# Patient Record
Sex: MALE | Race: WHITE | NOT HISPANIC OR LATINO | Employment: OTHER | URBAN - METROPOLITAN AREA
[De-identification: names, ages, dates, MRNs, and addresses within clinical notes are randomized per-mention and may not be internally consistent; named-entity substitution may affect disease eponyms.]

---

## 2017-02-28 RX ORDER — LISINOPRIL AND HYDROCHLOROTHIAZIDE 12.5; 1 MG/1; MG/1
TABLET ORAL
Qty: 90 TABLET | Refills: 3 | Status: SHIPPED | OUTPATIENT
Start: 2017-02-28 | End: 2018-02-13 | Stop reason: SDUPTHER

## 2017-02-28 RX ORDER — ATORVASTATIN CALCIUM 40 MG/1
TABLET, FILM COATED ORAL
Qty: 90 TABLET | Refills: 3 | Status: SHIPPED | OUTPATIENT
Start: 2017-02-28 | End: 2018-03-26 | Stop reason: SDUPTHER

## 2017-03-10 RX ORDER — CLOPIDOGREL BISULFATE 75 MG/1
75 TABLET ORAL DAILY
Qty: 90 TABLET | Refills: 1 | Status: SHIPPED | OUTPATIENT
Start: 2017-03-10 | End: 2017-09-01 | Stop reason: SDUPTHER

## 2017-05-08 ENCOUNTER — CLINICAL SUPPORT NO REQUIREMENTS (OUTPATIENT)
Dept: CARDIOLOGY | Facility: CLINIC | Age: 72
End: 2017-05-08

## 2017-05-08 VITALS — SYSTOLIC BLOOD PRESSURE: 118 MMHG | DIASTOLIC BLOOD PRESSURE: 84 MMHG

## 2017-05-08 DIAGNOSIS — I49.01 VF (VENTRICULAR FIBRILLATION) (HCC): Primary | ICD-10-CM

## 2017-05-25 RX ORDER — LISINOPRIL 20 MG/1
TABLET ORAL
Qty: 90 TABLET | Refills: 0 | Status: SHIPPED | OUTPATIENT
Start: 2017-05-25 | End: 2017-09-08 | Stop reason: SDUPTHER

## 2017-09-01 RX ORDER — CLOPIDOGREL BISULFATE 75 MG/1
TABLET ORAL
Qty: 90 TABLET | Refills: 0 | Status: SHIPPED | OUTPATIENT
Start: 2017-09-01 | End: 2018-01-18 | Stop reason: SDUPTHER

## 2017-09-08 RX ORDER — LISINOPRIL 20 MG/1
TABLET ORAL
Qty: 90 TABLET | Refills: 0 | Status: SHIPPED | OUTPATIENT
Start: 2017-09-08 | End: 2017-12-11 | Stop reason: SDUPTHER

## 2017-10-13 ENCOUNTER — HOSPITAL ENCOUNTER (OUTPATIENT)
Dept: GENERAL RADIOLOGY | Facility: HOSPITAL | Age: 72
Discharge: HOME | End: 2017-10-13
Attending: INTERNAL MEDICINE | Admitting: INTERNAL MEDICINE

## 2017-10-13 ENCOUNTER — TRANSCRIBE ORDERS (OUTPATIENT)
Dept: ADMINISTRATIVE | Facility: HOSPITAL | Age: 72
End: 2017-10-13

## 2017-10-13 DIAGNOSIS — M25.552 LEFT HIP PAIN: Primary | ICD-10-CM

## 2017-10-13 PROCEDURE — 73502 X-RAY EXAM HIP UNI 2-3 VIEWS: CPT

## 2017-10-16 ENCOUNTER — OFFICE VISIT (OUTPATIENT)
Dept: CARDIOLOGY | Facility: CLINIC | Age: 72
End: 2017-10-16

## 2017-10-16 VITALS
WEIGHT: 274 LBS | BODY MASS INDEX: 31.7 KG/M2 | HEIGHT: 78 IN | DIASTOLIC BLOOD PRESSURE: 82 MMHG | SYSTOLIC BLOOD PRESSURE: 119 MMHG | HEART RATE: 84 BPM

## 2017-10-16 DIAGNOSIS — I49.01 VF (VENTRICULAR FIBRILLATION) (HCC): ICD-10-CM

## 2017-10-16 DIAGNOSIS — E78.2 MIXED HYPERLIPIDEMIA: ICD-10-CM

## 2017-10-16 DIAGNOSIS — I25.10 CORONARY ARTERY DISEASE INVOLVING NATIVE CORONARY ARTERY OF NATIVE HEART WITHOUT ANGINA PECTORIS: Primary | ICD-10-CM

## 2017-10-16 DIAGNOSIS — I10 BENIGN HYPERTENSION: ICD-10-CM

## 2017-10-16 PROCEDURE — 93289 INTERROG DEVICE EVAL HEART: CPT | Performed by: INTERNAL MEDICINE

## 2017-10-16 PROCEDURE — 99214 OFFICE O/P EST MOD 30 MIN: CPT | Performed by: INTERNAL MEDICINE

## 2017-10-16 NOTE — PROGRESS NOTES
[facility] at [facility]  Office visit  Abbe Palacios  1945  [phone]    VISIT DATE:  10/16/2017    PCP: Xavier Briones MD  [address] [place] [id number]    CC:  Chief Complaint   Patient presents with   • Coronary Artery Disease     follow up       PROBLEM LIST:  1.  Coronary artery disease:  a.  Status post stenting of right coronary artery, bare-metal stent for acute myocardial infarction, 11/07/2007.  b. Status post elective stenting of proximal left anterior descending coronary artery, 11/14/2007.  c. Elina-infarction VF and post-infarction  ventricular ectopy with positive EP study culminating in dual-chamber ICD implantation, November 2007.  d. Recurrent chest pain with non-STEMI, status post cardiac catheterization, 01/24/2013, Dr. Jarrett:  PTCA and stenting of the first diagonal using  a 2.5 x 24 mm Promus drug-eluting stent, PTCA and stenting of the ramus intermedius using a 2.25 x 20 mm Promus drug-eluting stent.  2. Benign hypertension.  3. Hyperlipidemia.  4. Peripheral neuropathy.  5. Status post ICD generator change,  January 2014.     ASSESSMENT:   Diagnosis Plan   1. Coronary artery disease involving native coronary artery of native heart without angina pectoris     2. Benign hypertension     3. Mixed hyperlipidemia     4. VF (ventricular fibrillation)       Device interrogation:  Medtronic dual-chamber ICD  97%) pacing, sensed P-wave 2.4 mV, capture threshold 0.625 V at 0.4 ms, impedance 513 ohms  0% RV pacing, sensed R-wave 6.1 mV, capture threshold 1.125 mV at 0.9 ms, impedance 399 ohms    PLAN:  Coronary artery disease: Stable and asymptomatic.  Discontinuing dual antiplatelet therapy.  Instructed to stop low-dose aspirin, continue Plavix.  Continue high intensity statin therapy and current afterload reduction    Elina-infarct V. fib status post ICD implantation: Normal device interrogation.    Hyperlipidemia: Well controlled: Continue current  "medical therapy    Hypertension: Goal less than 140/90, controlled.  Continue current medical therapy    History of old pontine stroke: Continue Plavix, statin and afterload reduction.      Subjective  Report stable functional capacity.  Denies chest pain, palpitations or dyspnea on exertion.  Blood pressures run less than 140/90 mmHg.  Does report prolonged bleeding if he cuts himself shaving.  Denies myalgias on statin therapy.  He is compliant with medical therapy.  Device interrogation was negative for arrhythmia.      PHYSICAL EXAMINATION:  Vitals:    10/16/17 1309   BP: 119/82   BP Location: Left arm   Patient Position: Sitting   Pulse: 84   Weight: 274 lb (124 kg)   Height: 78\" (198.1 cm)     General Appearance:    Alert, cooperative, no distress, appears stated age   Head:    Normocephalic, without obvious abnormality, atraumatic   Eyes:    conjunctiva/corneas clear   Nose:   Nares normal, septum midline, mucosa normal, no drainage   Throat:   Lips, teeth and gums normal   Neck:   Supple, symmetrical, trachea midline, no carotid    bruit or JVD   Lungs:     Clear to auscultation bilaterally, respirations unlabored   Chest Wall:    No tenderness or deformity    Heart:    Regular rate and rhythm, S1 and S2 normal, no murmur, rub   or gallop, normal carotid impulse bilaterally without bruit.   Abdomen:     Soft, non-tender   Extremities:   Extremities normal, atraumatic, no cyanosis or edema   Pulses:   2+ and symmetric all extremities   Skin:   Skin color, texture, turgor normal, no rashes or lesions       Diagnostic Data:  Procedures  No results found for: CHLPL, TRIG, HDL, LDLDIRECT  Lab Results   Component Value Date    GLUCOSE 98 01/06/2014    BUN 22 (H) 01/06/2014    CREATININE 1.2 01/06/2014     01/06/2014    K 4.3 01/06/2014     (H) 01/06/2014    CO2 27 01/06/2014     Lab Results   Component Value Date    HGBA1C 6.9 (H) 01/06/2014     Lab Results   Component Value Date    WBC 11.05 (H) " 01/06/2014    HGB 15.8 01/06/2014    HCT 45.1 01/06/2014     01/06/2014       Allergies  Allergies   Allergen Reactions   • Biaxin [Clarithromycin]    • Levaquin [Levofloxacin]    • Nitroglycerin    • Penicillins        Current Medications    Current Outpatient Prescriptions:   •  aspirin 81 MG EC tablet, Take 81 mg by mouth Daily., Disp: , Rfl:   •  atorvastatin (LIPITOR) 40 MG tablet, take 1 tablet by mouth once daily, Disp: 90 tablet, Rfl: 3  •  clopidogrel (PLAVIX) 75 MG tablet, take 1 tablet by mouth once daily, Disp: 90 tablet, Rfl: 0  •  Glucos-Chond-Sterol-Fish Oil (GLUCOSAMINE CHONDROITIN PLUS PO), Take  by mouth Daily., Disp: , Rfl:   •  lisinopril (PRINIVIL,ZESTRIL) 20 MG tablet, take 1 tablet by mouth once daily, Disp: 90 tablet, Rfl: 0  •  lisinopril-hydrochlorothiazide (PRINZIDE,ZESTORETIC) 10-12.5 MG per tablet, take 1 tablet by mouth once daily, Disp: 90 tablet, Rfl: 3  •  metoprolol tartrate (LOPRESSOR) 50 MG tablet, TAKE 1 TABLET EVERY 12 HOURS DAILY., Disp: 180 tablet, Rfl: 3  •  Multiple Vitamins-Minerals (MULTIVITAMIN ADULT PO), Take  by mouth Daily., Disp: , Rfl:   •  Omega 3 1000 MG capsule, Take  by mouth Daily., Disp: , Rfl:           ROS  Review of Systems   Cardiovascular: Negative for chest pain, dyspnea on exertion, irregular heartbeat and palpitations.   Respiratory: Negative for shortness of breath.          SOCIAL HX  Social History     Social History   • Marital status:      Spouse name: N/A   • Number of children: N/A   • Years of education: N/A     Occupational History   • Not on file.     Social History Main Topics   • Smoking status: Never Smoker   • Smokeless tobacco: Never Used   • Alcohol use Yes      Comment: occassionally    • Drug use: No   • Sexual activity: Defer     Other Topics Concern   • Not on file     Social History Narrative       FAMILY HX  History reviewed. No pertinent family history.          Abbe Mendieta III, MD, [facility]

## 2017-12-07 RX ORDER — METOPROLOL TARTRATE 50 MG/1
TABLET, FILM COATED ORAL
Qty: 180 TABLET | Refills: 1 | Status: SHIPPED | OUTPATIENT
Start: 2017-12-07 | End: 2018-06-01 | Stop reason: SDUPTHER

## 2017-12-12 RX ORDER — LISINOPRIL 20 MG/1
TABLET ORAL
Qty: 90 TABLET | Refills: 3 | Status: SHIPPED | OUTPATIENT
Start: 2017-12-12 | End: 2018-02-13 | Stop reason: SDUPTHER

## 2017-12-20 ENCOUNTER — TELEPHONE (OUTPATIENT)
Dept: CARDIOLOGY | Facility: CLINIC | Age: 72
End: 2017-12-20

## 2017-12-20 NOTE — TELEPHONE ENCOUNTER
Patient called and needs cardiac clearance for his hip replacement on Jan 16 th with . Takes Plavix every day. Please advise.

## 2017-12-20 NOTE — TELEPHONE ENCOUNTER
Called patient ans told him to Stop plavix 5 days prior to procedure. Perioperative ICD reprogramming not indicated. Patient verbalized understanding. Faxed clearance letter to . Fax # [phone].

## 2018-01-02 ENCOUNTER — HOSPITAL ENCOUNTER (OUTPATIENT)
Dept: GENERAL RADIOLOGY | Facility: HOSPITAL | Age: 73
Discharge: HOME | End: 2018-01-02
Attending: INTERNAL MEDICINE | Admitting: INTERNAL MEDICINE

## 2018-01-02 ENCOUNTER — TRANSCRIBE ORDERS (OUTPATIENT)
Dept: ADMINISTRATIVE | Facility: HOSPITAL | Age: 73
End: 2018-01-02

## 2018-01-02 DIAGNOSIS — I10 BENIGN ESSENTIAL HYPERTENSION: Primary | ICD-10-CM

## 2018-01-02 PROCEDURE — 71046 X-RAY EXAM CHEST 2 VIEWS: CPT

## 2018-01-18 RX ORDER — CLOPIDOGREL BISULFATE 75 MG/1
TABLET ORAL
Qty: 90 TABLET | Refills: 0 | Status: SHIPPED | OUTPATIENT
Start: 2018-01-18 | End: 2018-04-21 | Stop reason: SDUPTHER

## 2018-02-13 RX ORDER — LISINOPRIL AND HYDROCHLOROTHIAZIDE 12.5; 1 MG/1; MG/1
TABLET ORAL
Qty: 90 TABLET | Refills: 3 | Status: SHIPPED | OUTPATIENT
Start: 2018-02-13 | End: 2019-02-28 | Stop reason: SDUPTHER

## 2018-03-26 RX ORDER — ATORVASTATIN CALCIUM 40 MG/1
TABLET, FILM COATED ORAL
Qty: 90 TABLET | Refills: 3 | Status: SHIPPED | OUTPATIENT
Start: 2018-03-26 | End: 2019-06-06 | Stop reason: SDUPTHER

## 2018-04-18 ENCOUNTER — OFFICE VISIT (OUTPATIENT)
Dept: CARDIOLOGY | Facility: CLINIC | Age: 73
End: 2018-04-18

## 2018-04-18 VITALS
HEART RATE: 77 BPM | BODY MASS INDEX: 31.47 KG/M2 | SYSTOLIC BLOOD PRESSURE: 110 MMHG | DIASTOLIC BLOOD PRESSURE: 66 MMHG | HEIGHT: 78 IN | WEIGHT: 272 LBS

## 2018-04-18 DIAGNOSIS — E78.2 MIXED HYPERLIPIDEMIA: ICD-10-CM

## 2018-04-18 DIAGNOSIS — I25.10 CORONARY ARTERY DISEASE INVOLVING NATIVE CORONARY ARTERY OF NATIVE HEART WITHOUT ANGINA PECTORIS: Primary | ICD-10-CM

## 2018-04-18 DIAGNOSIS — I10 BENIGN HYPERTENSION: ICD-10-CM

## 2018-04-18 PROCEDURE — 99214 OFFICE O/P EST MOD 30 MIN: CPT | Performed by: INTERNAL MEDICINE

## 2018-04-18 PROCEDURE — 93282 PRGRMG EVAL IMPLANTABLE DFB: CPT | Performed by: INTERNAL MEDICINE

## 2018-04-18 RX ORDER — LISINOPRIL 20 MG/1
20 TABLET ORAL DAILY
Refills: 0 | COMMUNITY
Start: 2018-03-09 | End: 2018-12-03 | Stop reason: SDUPTHER

## 2018-04-18 RX ORDER — TAMSULOSIN HYDROCHLORIDE 0.4 MG/1
0.4 CAPSULE ORAL DAILY
Refills: 0 | COMMUNITY
Start: 2018-03-23 | End: 2019-03-31 | Stop reason: SDUPTHER

## 2018-04-18 NOTE — PROGRESS NOTES
[facility] at [facility]  Office visit  Abbe Palacios  1945  [phone]    VISIT DATE:  10/16/2017    PCP: Xavier Briones MD  [address] [address]    CC:  Chief Complaint   Patient presents with   • Coronary Artery Disease       PROBLEM LIST:  1.  Coronary artery disease:  a.  Status post stenting of right coronary artery, bare-metal stent for acute myocardial infarction, 11/07/2007.  b. Status post elective stenting of proximal left anterior descending coronary artery, 11/14/2007.  c. Elina-infarction VF and post-infarction  ventricular ectopy with positive EP study culminating in dual-chamber ICD implantation, November 2007.  d. Recurrent chest pain with non-STEMI, status post cardiac catheterization, 01/24/2013, Dr. Jarrett:  PTCA and stenting of the first diagonal using  a 2.5 x 24 mm Promus drug-eluting stent, PTCA and stenting of the ramus intermedius using a 2.25 x 20 mm Promus drug-eluting stent.  2. Benign hypertension.  3. Hyperlipidemia.  4. Peripheral neuropathy.  5. Status post ICD generator change,  January 2014.     ASSESSMENT:   Diagnosis Plan   1. Coronary artery disease involving native coronary artery of native heart without angina pectoris     2. Benign hypertension     3. Mixed hyperlipidemia       Device interrogation:  Medtronic dual-chamber ICD  93%Atrial pacing, sensed P-wave 1.6 mV, capture threshold 0.5 V at 0.4 ms, impedance 456 ohms  0% RV pacing, sensed R-wave 7.1 mV, capture threshold 1.125 mV at 0.4 ms, impedance 399 ohms  10 episodes of nonsustained VT, all less than 5 beats in duration  Estimated battery life 20-34 months    PLAN:  Coronary artery disease: Stable and asymptomatic. continue Plavix.  Continue high intensity statin therapy and current afterload reduction    Elina-infarct V. fib status post ICD implantation: Normal device interrogation.    Hyperlipidemia: Well controlled: Continue current medical therapy, Goal LDL  "S100, ideally less than 70.    Hypertension: Goal less than 130/80, controlled.  Continue current medical therapy    History of old pontine stroke: Continue Plavix, statin and afterload reduction.      Subjective  Report stable functional capacity.  Denies chest pain, palpitations or dyspnea on exertion.  Blood pressures run less than 140/90 mmHg.    Denies myalgias on statin therapy.  He is compliant with medical therapy.  Device interrogation was negative for arrhythmia.  Has recovered well from left hip surgery in January.  Had some mild blood pressure lability after that but is stabilized.  Compliant with medical therapy.  Still with mild intermittent orthostasis.    PHYSICAL EXAMINATION:  Vitals:    04/18/18 1311   BP: 110/66   BP Location: Left arm   Patient Position: Sitting   Pulse: 77   Weight: 123 kg (272 lb)   Height: 198.1 cm (78\")     General Appearance:    Alert, cooperative, no distress, appears stated age   Head:    Normocephalic, without obvious abnormality, atraumatic   Eyes:    conjunctiva/corneas clear   Nose:   Nares normal, septum midline, mucosa normal, no drainage   Throat:   Lips, teeth and gums normal   Neck:   Supple, symmetrical, trachea midline, no carotid    bruit or JVD   Lungs:     Clear to auscultation bilaterally, respirations unlabored   Chest Wall:    No tenderness or deformity    Heart:    Regular rate and rhythm, S1 and S2 normal, no murmur, rub   or gallop, normal carotid impulse bilaterally without bruit.   Abdomen:     Soft, non-tender   Extremities:   Extremities normal, atraumatic, no cyanosis or edema   Pulses:   2+ and symmetric all extremities   Skin:   Skin color, texture, turgor normal, no rashes or lesions       Diagnostic Data:  Procedures  No results found for: CHLPL, TRIG, HDL, LDLDIRECT  Lab Results   Component Value Date    GLUCOSE 98 01/06/2014    BUN 22 (H) 01/06/2014    CREATININE 1.2 01/06/2014     01/06/2014    K 4.3 01/06/2014     (H) 01/06/2014 "    CO2 27 01/06/2014     Lab Results   Component Value Date    HGBA1C 6.9 (H) 01/06/2014     Lab Results   Component Value Date    WBC 11.05 (H) 01/06/2014    HGB 15.8 01/06/2014    HCT 45.1 01/06/2014     01/06/2014       Allergies  Allergies   Allergen Reactions   • Biaxin [Clarithromycin]    • Levaquin [Levofloxacin]    • Nitroglycerin    • Penicillins        Current Medications    Current Outpatient Prescriptions:   •  atorvastatin (LIPITOR) 40 MG tablet, take 1 tablet by mouth once daily, Disp: 90 tablet, Rfl: 3  •  clopidogrel (PLAVIX) 75 MG tablet, take 1 tablet by mouth once daily, Disp: 90 tablet, Rfl: 0  •  Glucos-Chond-Sterol-Fish Oil (GLUCOSAMINE CHONDROITIN PLUS PO), Take  by mouth Daily., Disp: , Rfl:   •  lisinopril (PRINIVIL,ZESTRIL) 20 MG tablet, Take 20 mg by mouth Daily., Disp: , Rfl: 0  •  lisinopril-hydrochlorothiazide (PRINZIDE,ZESTORETIC) 10-12.5 MG per tablet, take 1 tablet by mouth once daily, Disp: 90 tablet, Rfl: 3  •  metoprolol tartrate (LOPRESSOR) 50 MG tablet, take 1 tablet every 12 hours, Disp: 180 tablet, Rfl: 1  •  Multiple Vitamins-Minerals (MULTIVITAMIN ADULT PO), Take  by mouth Daily., Disp: , Rfl:   •  Omega 3 1000 MG capsule, Take  by mouth Daily., Disp: , Rfl:   •  tamsulosin (FLOMAX) 0.4 MG capsule 24 hr capsule, Take 0.4 mg by mouth Daily., Disp: , Rfl: 0          ROS  Review of Systems   Cardiovascular: Negative for chest pain, dyspnea on exertion, irregular heartbeat and palpitations.   Respiratory: Positive for sputum production. Negative for shortness of breath.          SOCIAL HX  Social History     Social History   • Marital status:      Spouse name: N/A   • Number of children: N/A   • Years of education: N/A     Occupational History   • Not on file.     Social History Main Topics   • Smoking status: Never Smoker   • Smokeless tobacco: Never Used   • Alcohol use Yes      Comment: occassionally    • Drug use: No   • Sexual activity: Defer     Other Topics  Concern   • Not on file     Social History Narrative   • No narrative on file       FAMILY HX  History reviewed. No pertinent family history.          Abbe Mendieta III, MD, FACC

## 2018-04-23 RX ORDER — CLOPIDOGREL BISULFATE 75 MG/1
TABLET ORAL
Qty: 90 TABLET | Refills: 0 | Status: SHIPPED | OUTPATIENT
Start: 2018-04-23 | End: 2018-07-14 | Stop reason: SDUPTHER

## 2018-06-01 RX ORDER — METOPROLOL TARTRATE 50 MG/1
TABLET, FILM COATED ORAL
Qty: 180 TABLET | Refills: 3 | Status: SHIPPED | OUTPATIENT
Start: 2018-06-01 | End: 2019-06-28 | Stop reason: SDUPTHER

## 2018-07-16 RX ORDER — CLOPIDOGREL BISULFATE 75 MG/1
TABLET ORAL
Qty: 90 TABLET | Refills: 0 | Status: SHIPPED | OUTPATIENT
Start: 2018-07-16 | End: 2018-10-05 | Stop reason: SDUPTHER

## 2018-10-05 RX ORDER — CLOPIDOGREL BISULFATE 75 MG/1
TABLET ORAL
Qty: 90 TABLET | Refills: 0 | Status: SHIPPED | OUTPATIENT
Start: 2018-10-05 | End: 2019-01-03 | Stop reason: SDUPTHER

## 2018-10-22 ENCOUNTER — OFFICE VISIT (OUTPATIENT)
Dept: CARDIOLOGY | Facility: CLINIC | Age: 73
End: 2018-10-22

## 2018-10-22 VITALS
OXYGEN SATURATION: 94 % | SYSTOLIC BLOOD PRESSURE: 118 MMHG | BODY MASS INDEX: 32.05 KG/M2 | WEIGHT: 277 LBS | DIASTOLIC BLOOD PRESSURE: 80 MMHG | HEART RATE: 78 BPM | HEIGHT: 78 IN

## 2018-10-22 DIAGNOSIS — I10 BENIGN HYPERTENSION: ICD-10-CM

## 2018-10-22 DIAGNOSIS — I25.10 CORONARY ARTERY DISEASE INVOLVING NATIVE CORONARY ARTERY OF NATIVE HEART WITHOUT ANGINA PECTORIS: Primary | ICD-10-CM

## 2018-10-22 DIAGNOSIS — E78.2 MIXED HYPERLIPIDEMIA: ICD-10-CM

## 2018-10-22 PROCEDURE — 99214 OFFICE O/P EST MOD 30 MIN: CPT | Performed by: INTERNAL MEDICINE

## 2018-10-22 PROCEDURE — 93283 PRGRMG EVAL IMPLANTABLE DFB: CPT | Performed by: INTERNAL MEDICINE

## 2018-10-22 RX ORDER — CYANOCOBALAMIN (VITAMIN B-12) 5000 MCG
1 TABLET,DISINTEGRATING ORAL DAILY
COMMUNITY

## 2018-10-22 NOTE — PROGRESS NOTES
[facility] at [facility]  Office visit  Abbe Palacios  1945  [phone]    VISIT DATE:  10/16/2017    PCP: Xavier Briones MD  [address] [id number]    CC:  Chief Complaint   Patient presents with   • Coronary Artery Disease   • Dizziness       PROBLEM LIST:  1.  Coronary artery disease:  a.  Status post stenting of right coronary artery, bare-metal stent for acute myocardial infarction, 11/07/2007.  b. Status post elective stenting of proximal left anterior descending coronary artery, 11/14/2007.  c. Elina-infarction VF and post-infarction  ventricular ectopy with positive EP study culminating in dual-chamber ICD implantation, November 2007.  d. Recurrent chest pain with non-STEMI, status post cardiac catheterization, 01/24/2013, Dr. Jarrett:  PTCA and stenting of the first diagonal using  a 2.5 x 24 mm Promus drug-eluting stent, PTCA and stenting of the ramus intermedius using a 2.25 x 20 mm Promus drug-eluting stent.  2. Benign hypertension.  3. Hyperlipidemia.  4. Peripheral neuropathy.  5. Status post ICD generator change,  January 2014.     ASSESSMENT:   Diagnosis Plan   1. Coronary artery disease involving native coronary artery of native heart without angina pectoris     2. Benign hypertension     3. Mixed hyperlipidemia       Device interrogation:  Medtronic dual-chamber ICD  95 %Atrial pacing, sensed P-wave 3.3 mV, capture threshold 0.5 V at 0.4 ms, impedance 532 ohms  0% RV pacing, sensed R-wave 8.5 mV, capture threshold 0.75 mV at 0.4 ms, impedance 399 ohms  No arrhythmia      PLAN:  Coronary artery disease: Stable and asymptomatic. continue Plavix.  Continue high intensity statin therapy and current afterload reduction    Elina-infarct V. fib status post ICD implantation: Normal device interrogation.    Hyperlipidemia: Very mild hypertriglyceridemia, otherwise well controlled: Continue current medical therapy, Goal LDL <100, ideally less than  "70.    Hypertension: Goal less than 130/80, controlled.  Continue current medical therapy.    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      Subjective  Report stable functional capacity.  Denies chest pain, palpitations or dyspnea on exertion.  Blood pressures run less than 140/90 mmHg.    Denies myalgias on statin therapy.  He is compliant with medical therapy.  Device interrogation was negative for arrhythmia.  Has recovered well from left hip surgery in January.   Compliant with medical therapy.  Had one episode of transient lightheadedness while on the toilet since his last evaluation.  Otherwise no issues with orthostasis.  Reviewed most recent fasting lipid panel.    PHYSICAL EXAMINATION:  Vitals:    10/22/18 1052   BP: 118/80   BP Location: Left arm   Patient Position: Sitting   Pulse: 78   SpO2: 94%   Weight: 126 kg (277 lb)   Height: 198.1 cm (78\")     General Appearance:    Alert, cooperative, no distress, appears stated age   Head:    Normocephalic, without obvious abnormality, atraumatic   Eyes:    conjunctiva/corneas clear   Nose:   Nares normal, septum midline, mucosa normal, no drainage   Throat:   Lips, teeth and gums normal   Neck:   Supple, symmetrical, trachea midline, no carotid    bruit or JVD   Lungs:     Clear to auscultation bilaterally, respirations unlabored   Chest Wall:    No tenderness or deformity    Heart:    Regular rate and rhythm, S1 and S2 normal, no murmur, rub   or gallop, normal carotid impulse bilaterally without bruit.   Abdomen:     Soft, non-tender   Extremities:   Extremities normal, atraumatic, no cyanosis or edema   Pulses:   2+ and symmetric all extremities   Skin:   Skin color, texture, turgor normal, no rashes or lesions       Diagnostic Data:  Procedures  No results found for: CHLPL, TRIG, HDL, LDLDIRECT  Lab Results   Component Value Date    GLUCOSE 98 01/06/2014    BUN 22 (H) 01/06/2014    CREATININE 1.2 01/06/2014     " 01/06/2014    K 4.3 01/06/2014     (H) 01/06/2014    CO2 27 01/06/2014     Lab Results   Component Value Date    HGBA1C 6.9 (H) 01/06/2014     Lab Results   Component Value Date    WBC 11.05 (H) 01/06/2014    HGB 15.8 01/06/2014    HCT 45.1 01/06/2014     01/06/2014       Allergies  Allergies   Allergen Reactions   • Biaxin [Clarithromycin]    • Levaquin [Levofloxacin]    • Nitroglycerin    • Penicillins        Current Medications    Current Outpatient Prescriptions:   •  acetaminophen (TYLENOL) 325 MG tablet, Take 650 mg by mouth As Needed for Mild Pain ., Disp: , Rfl:   •  atorvastatin (LIPITOR) 40 MG tablet, take 1 tablet by mouth once daily, Disp: 90 tablet, Rfl: 3  •  clopidogrel (PLAVIX) 75 MG tablet, TAKE 1 TABLET BY MOUTH ONCE DAILY, Disp: 90 tablet, Rfl: 0  •  lisinopril (PRINIVIL,ZESTRIL) 20 MG tablet, Take 20 mg by mouth Daily., Disp: , Rfl: 0  •  lisinopril-hydrochlorothiazide (PRINZIDE,ZESTORETIC) 10-12.5 MG per tablet, take 1 tablet by mouth once daily, Disp: 90 tablet, Rfl: 3  •  metoprolol tartrate (LOPRESSOR) 50 MG tablet, take 1 tablet by mouth every 12 hours, Disp: 180 tablet, Rfl: 3  •  Multiple Vitamins-Minerals (MULTIVITAMIN ADULT PO), Take  by mouth Daily., Disp: , Rfl:   •  Omega 3 1000 MG capsule, Take  by mouth Daily., Disp: , Rfl:   •  tamsulosin (FLOMAX) 0.4 MG capsule 24 hr capsule, Take 0.4 mg by mouth Daily., Disp: , Rfl: 0  •  vitamin B-12 (CYANOCOBALAMIN) 100 MCG tablet, Take 50 mcg by mouth Daily., Disp: , Rfl:           ROS  Review of Systems   Cardiovascular: Negative for chest pain, dyspnea on exertion, irregular heartbeat and palpitations.   Respiratory: Positive for sputum production. Negative for shortness of breath.          SOCIAL HX  Social History     Social History   • Marital status:      Spouse name: N/A   • Number of children: N/A   • Years of education: N/A     Occupational History   • Not on file.     Social History Main Topics   • Smoking status:  Never Smoker   • Smokeless tobacco: Never Used   • Alcohol use Yes      Comment: occassionally    • Drug use: No   • Sexual activity: Defer     Other Topics Concern   • Not on file     Social History Narrative   • No narrative on file       FAMILY HX  Family History   Problem Relation Age of Onset   • Hepatitis Mother    • Dementia Father    • Stroke Sister              Abbe Mendieta III, MD, FACC

## 2018-12-04 RX ORDER — LISINOPRIL 20 MG/1
TABLET ORAL
Qty: 90 TABLET | Refills: 0 | Status: SHIPPED | OUTPATIENT
Start: 2018-12-04 | End: 2019-03-07 | Stop reason: SDUPTHER

## 2019-01-03 RX ORDER — CLOPIDOGREL BISULFATE 75 MG/1
TABLET ORAL
Qty: 90 TABLET | Refills: 0 | Status: SHIPPED | OUTPATIENT
Start: 2019-01-03 | End: 2019-04-02 | Stop reason: SDUPTHER

## 2019-02-28 RX ORDER — LISINOPRIL AND HYDROCHLOROTHIAZIDE 12.5; 1 MG/1; MG/1
TABLET ORAL
Qty: 90 TABLET | Refills: 0 | Status: SHIPPED | OUTPATIENT
Start: 2019-02-28 | End: 2019-04-29

## 2019-03-07 RX ORDER — LISINOPRIL 20 MG/1
TABLET ORAL
Qty: 90 TABLET | Refills: 1 | Status: SHIPPED | OUTPATIENT
Start: 2019-03-07 | End: 2019-09-09 | Stop reason: SDUPTHER

## 2019-03-22 PROBLEM — L91.8 INFLAMED SKIN TAG: Status: ACTIVE | Noted: 2019-03-22

## 2019-03-22 PROBLEM — M25.552 LEFT HIP PAIN: Status: ACTIVE | Noted: 2019-03-22

## 2019-03-22 PROBLEM — N40.0 HYPERTROPHY OF PROSTATE WITHOUT URINARY OBSTRUCTION: Status: ACTIVE | Noted: 2019-03-22

## 2019-03-22 PROBLEM — I20.9 ANGINA PECTORIS: Status: ACTIVE | Noted: 2019-03-22

## 2019-03-22 PROBLEM — E11.42 DIABETIC POLYNEUROPATHY ASSOCIATED WITH TYPE 2 DIABETES MELLITUS (HCC): Status: ACTIVE | Noted: 2019-03-22

## 2019-03-22 PROBLEM — I25.10 ASHD (ARTERIOSCLEROTIC HEART DISEASE): Status: ACTIVE | Noted: 2019-03-22

## 2019-03-22 PROBLEM — J30.2 SEASONAL ALLERGIC RHINITIS: Status: ACTIVE | Noted: 2019-03-22

## 2019-03-22 PROBLEM — I10 BENIGN ESSENTIAL HYPERTENSION: Status: ACTIVE | Noted: 2019-03-22

## 2019-03-22 PROBLEM — Z13.9 ENCOUNTER FOR SCREENING: Status: ACTIVE | Noted: 2019-03-22

## 2019-03-22 PROBLEM — Z13.89 ENCOUNTER FOR SCREENING FOR OTHER DISORDER: Status: ACTIVE | Noted: 2019-03-22

## 2019-03-22 PROBLEM — M25.569 PAIN, KNEE: Status: ACTIVE | Noted: 2019-03-22

## 2019-03-22 PROBLEM — I50.22 CHRONIC SYSTOLIC (CONGESTIVE) HEART FAILURE (HCC): Status: ACTIVE | Noted: 2019-03-22

## 2019-03-22 PROBLEM — E78.2 MIXED HYPERLIPIDEMIA: Status: ACTIVE | Noted: 2019-03-22

## 2019-03-22 PROBLEM — E11.51 DIABETIC PERIPHERAL ANGIOPATHY (HCC): Status: ACTIVE | Noted: 2019-03-22

## 2019-03-22 PROBLEM — E66.9 OBESITY: Status: ACTIVE | Noted: 2019-03-22

## 2019-04-01 RX ORDER — TAMSULOSIN HYDROCHLORIDE 0.4 MG/1
CAPSULE ORAL
Qty: 30 CAPSULE | Refills: 5 | Status: SHIPPED | OUTPATIENT
Start: 2019-04-01 | End: 2019-10-08

## 2019-04-02 RX ORDER — CLOPIDOGREL BISULFATE 75 MG/1
TABLET ORAL
Qty: 90 TABLET | Refills: 0 | Status: SHIPPED | OUTPATIENT
Start: 2019-04-02 | End: 2019-06-28 | Stop reason: SDUPTHER

## 2019-04-11 ENCOUNTER — LAB REQUISITION (OUTPATIENT)
Dept: LAB | Facility: HOSPITAL | Age: 74
End: 2019-04-11

## 2019-04-11 ENCOUNTER — OFFICE VISIT (OUTPATIENT)
Dept: INTERNAL MEDICINE | Facility: CLINIC | Age: 74
End: 2019-04-11

## 2019-04-11 VITALS
HEART RATE: 80 BPM | BODY MASS INDEX: 32.28 KG/M2 | HEIGHT: 78 IN | WEIGHT: 279 LBS | DIASTOLIC BLOOD PRESSURE: 88 MMHG | SYSTOLIC BLOOD PRESSURE: 128 MMHG

## 2019-04-11 DIAGNOSIS — I10 BENIGN ESSENTIAL HYPERTENSION: ICD-10-CM

## 2019-04-11 DIAGNOSIS — E11.51 DIABETIC PERIPHERAL ANGIOPATHY (HCC): Primary | ICD-10-CM

## 2019-04-11 DIAGNOSIS — Z00.00 ROUTINE GENERAL MEDICAL EXAMINATION AT A HEALTH CARE FACILITY: ICD-10-CM

## 2019-04-11 DIAGNOSIS — E78.2 MIXED HYPERLIPIDEMIA: ICD-10-CM

## 2019-04-11 DIAGNOSIS — B35.4 TINEA CORPORIS: ICD-10-CM

## 2019-04-11 DIAGNOSIS — I25.10 CORONARY ARTERY DISEASE INVOLVING NATIVE CORONARY ARTERY OF NATIVE HEART WITHOUT ANGINA PECTORIS: ICD-10-CM

## 2019-04-11 LAB
ALBUMIN SERPL-MCNC: 4.8 G/DL (ref 3.5–5.2)
ALBUMIN/GLOB SERPL: 1.8 G/DL
ALP SERPL-CCNC: 85 U/L (ref 39–117)
ALT SERPL-CCNC: 26 U/L (ref 1–41)
AST SERPL-CCNC: 27 U/L (ref 1–40)
BILIRUB SERPL-MCNC: 0.6 MG/DL (ref 0.2–1.2)
BUN SERPL-MCNC: 14 MG/DL (ref 8–23)
BUN/CREAT SERPL: 11.9 (ref 7–25)
CALCIUM SERPL-MCNC: 9.8 MG/DL (ref 8.6–10.5)
CHLORIDE SERPL-SCNC: 104 MMOL/L (ref 98–107)
CHOLEST SERPL-MCNC: 160 MG/DL (ref 0–200)
CO2 SERPL-SCNC: 28.1 MMOL/L (ref 22–29)
CREAT SERPL-MCNC: 1.18 MG/DL (ref 0.76–1.27)
GLOBULIN SER CALC-MCNC: 2.6 GM/DL
GLUCOSE SERPL-MCNC: 134 MG/DL (ref 65–99)
HBA1C MFR BLD: 6.42 % (ref 4.8–5.6)
HDLC SERPL-MCNC: 45 MG/DL (ref 40–60)
LDLC SERPL CALC-MCNC: 86 MG/DL (ref 0–100)
POTASSIUM SERPL-SCNC: 4.8 MMOL/L (ref 3.5–5.2)
PROT SERPL-MCNC: 7.4 G/DL (ref 6–8.5)
SODIUM SERPL-SCNC: 143 MMOL/L (ref 136–145)
TRIGL SERPL-MCNC: 146 MG/DL (ref 0–150)
VLDLC SERPL CALC-MCNC: 29.2 MG/DL

## 2019-04-11 PROCEDURE — 99214 OFFICE O/P EST MOD 30 MIN: CPT | Performed by: INTERNAL MEDICINE

## 2019-04-11 PROCEDURE — 36415 COLL VENOUS BLD VENIPUNCTURE: CPT | Performed by: INTERNAL MEDICINE

## 2019-04-11 RX ORDER — KETOCONAZOLE 20 MG/G
CREAM TOPICAL DAILY
Qty: 15 G | Refills: 1 | Status: SHIPPED | OUTPATIENT
Start: 2019-04-11 | End: 2019-11-04

## 2019-04-11 NOTE — PROGRESS NOTES
[facility]     Abbe Palacios  1945   [id number]      Patient Care Team:  Xavier Briones MD as PCP - General  Xavier Briones MD as PCP - Family Medicine    Chief Complaint::   Chief Complaint   Patient presents with   • Coronary Artery Disease   • Hyperlipidemia   • Hypertension   • Diabetes        HPI  Judge Palacios is now 73.  He comes in for follow-up of his diabetes, hypertension, hyperlipidemia, and coronary artery disease.  He feels well and has no complaints today.  He does have a skin lesion on the left lower leg that he would like me to look at.  He remains physically active when the weather permits.  When he was in [place] he played golf 3 days a week and walk to the other days.  Now that the weather is warmer here he is walking most days.  He tells me he is not taking his Flomax regularly because he has very little difficulty with urine flow.    Chronic Conditions:      Patient Active Problem List   Diagnosis   • Coronary artery disease   • Benign hypertension   • Hyperlipidemia   • Peripheral neuropathy   • VF (ventricular fibrillation) (CMS/HCC)   • Hypertrophy of prostate without urinary obstruction   • Diabetic polyneuropathy associated with type 2 diabetes mellitus (CMS/HCC)   • Angina pectoris (CMS/HCC)   • Obesity   • Chronic systolic (congestive) heart failure (CMS/HCC)   • Mixed hyperlipidemia   • Encounter for screening for other disorder   • Diabetic peripheral angiopathy (CMS/HCC)   • Benign essential hypertension   • ASHD (arteriosclerotic heart disease)   • Seasonal allergic rhinitis   • Inflamed skin tag   • Left hip pain   • Encounter for screening   • Pain, knee        Past Medical History:   Diagnosis Date   • Benign hypertension    • Coronary artery disease    • Hyperlipidemia    • Implantable cardioverter-defibrillator (ICD) generator end of life 01/01/2014    Status post ICD generator change   • Myocardial infarction (CMS/HCC) 2013    NSTEMI   •  Osteoarthritis of left hip    • Peripheral neuropathy    • Stroke (CMS/HCC)     PONTINE STROKE   • Ventricular tachycardia (CMS/HCC)        Past Surgical History:   Procedure Laterality Date   • APPENDECTOMY  1979   • CARDIAC PACEMAKER PLACEMENT  2007    dual chamber   • CORONARY STENT PLACEMENT  2007    elective stents to LAD, CIRC   • CORONARY STENT PLACEMENT  2007    emergent L heart cath with stents RCA    • CORONARY STENT PLACEMENT      2013?  stents to diagonal and ramus   • HIP SURGERY Left 01/16/2018    total left hip arthroplasty, anterior approach   • SQUAMOUS CELL CARCINOMA EXCISION         Family History   Problem Relation Age of Onset   • Hepatitis Mother    • Dementia Father    • Stroke Sister    • Hypertension Brother    • Heart attack Paternal Grandfather 70       Social History     Socioeconomic History   • Marital status:      Spouse name: Not on file   • Number of children: Not on file   • Years of education: Not on file   • Highest education level: Not on file   Tobacco Use   • Smoking status: Never Smoker   • Smokeless tobacco: Never Used   • Tobacco comment: no passive smoke exposure   Substance and Sexual Activity   • Alcohol use: Yes     Comment: occassionally    • Drug use: No   • Sexual activity: Defer       Allergies   Allergen Reactions   • Biaxin [Clarithromycin] Nausea Only   • Levaquin [Levofloxacin] Nausea Only   • Nitroglycerin Other (See Comments)     hypotension   • Penicillins Rash         Current Outpatient Medications:   •  acetaminophen (TYLENOL) 325 MG tablet, Take 650 mg by mouth As Needed for Mild Pain ., Disp: , Rfl:   •  atorvastatin (LIPITOR) 40 MG tablet, take 1 tablet by mouth once daily, Disp: 90 tablet, Rfl: 3  •  clopidogrel (PLAVIX) 75 MG tablet, TAKE 1 TABLET BY MOUTH ONCE DAILY, Disp: 90 tablet, Rfl: 0  •  lisinopril (PRINIVIL,ZESTRIL) 20 MG tablet, TAKE 1 TABLET BY MOUTH ONCE DAILY, Disp: 90 tablet, Rfl: 1  •  lisinopril-hydrochlorothiazide  "(PRINZIDE,ZESTORETIC) 10-12.5 MG per tablet, TAKE 1 TABLET BY MOUTH ONCE DAILY (Patient taking differently: 1 tab bid), Disp: 90 tablet, Rfl: 0  •  metoprolol tartrate (LOPRESSOR) 50 MG tablet, take 1 tablet by mouth every 12 hours, Disp: 180 tablet, Rfl: 3  •  Multiple Vitamins-Minerals (MULTIVITAMIN ADULT PO), Take  by mouth Daily., Disp: , Rfl:   •  Omega 3 1000 MG capsule, Take  by mouth Daily., Disp: , Rfl:   •  vitamin B-12 (CYANOCOBALAMIN) 100 MCG tablet, Take 50 mcg by mouth Daily., Disp: , Rfl:   •  tamsulosin (FLOMAX) 0.4 MG capsule 24 hr capsule, TAKE 1 CAPSULE BY MOUTH ONCE DAILY. TAKE HALF AN HOUR AFTER THE SAME MEAL EACH DAY, Disp: 30 capsule, Rfl: 5    Review of Systems   Constitutional: Negative for chills, fatigue and fever.   HENT: Negative for congestion, ear pain and sinus pressure.    Respiratory: Negative for cough, chest tightness, shortness of breath and wheezing.    Cardiovascular: Negative for chest pain and palpitations.   Gastrointestinal: Negative for abdominal pain, blood in stool and constipation.   Skin: Negative for color change.   Allergic/Immunologic: Negative for environmental allergies.   Neurological: Negative for dizziness, speech difficulty and headache.   Psychiatric/Behavioral: Negative for decreased concentration. The patient is not nervous/anxious.         Vital Signs  Vitals:    04/11/19 0853   BP: 128/88   BP Location: Right arm   Patient Position: Sitting   Cuff Size: Adult   Pulse: 80   Weight: 127 kg (279 lb)   Height: 198.1 cm (77.99\")       Physical Exam   Constitutional: He is oriented to person, place, and time. He appears well-developed and well-nourished.   HENT:   Head: Normocephalic and atraumatic.   Cardiovascular: Normal rate, regular rhythm and normal heart sounds.   No murmur heard.  Pulmonary/Chest: Effort normal and breath sounds normal.   Neurological: He is alert and oriented to person, place, and time.   Skin:   On the left lower extremity above the " left medial malleolus he has about a 3 cm well-circumscribed lesion with central clearing consistent with ringworm.   Psychiatric: He has a normal mood and affect.   Vitals reviewed.     Procedures    ACE III MINI             Assessment/Plan:    Diabetes, A1c pending.  Foot exam performed 10/9/18, eye exam done recently.  Currently diet controlled.    Hypertension controlled on lisinopril, plus lisinopril hydrochlorothiazide.    Coronary artery disease stable on clopidogrel and metformin.    Tinea corporis, ketoconazole cream twice daily for the next 2 weeks.  If not improved would try fluconazole, but at that point would refer to dermatology.      Plan of care reviewed with patient at the conclusion of today's visit. Education was provided regarding diagnosis, management, and any prescribed or recommended OTC medications.Patient verbalizes understanding of and agreement with management plan.         Xavier Briones MD

## 2019-04-29 ENCOUNTER — OFFICE VISIT (OUTPATIENT)
Dept: CARDIOLOGY | Facility: CLINIC | Age: 74
End: 2019-04-29

## 2019-04-29 VITALS
WEIGHT: 284 LBS | BODY MASS INDEX: 32.86 KG/M2 | HEART RATE: 77 BPM | SYSTOLIC BLOOD PRESSURE: 134 MMHG | DIASTOLIC BLOOD PRESSURE: 82 MMHG | HEIGHT: 78 IN

## 2019-04-29 DIAGNOSIS — I10 BENIGN ESSENTIAL HYPERTENSION: ICD-10-CM

## 2019-04-29 DIAGNOSIS — I25.10 CORONARY ARTERY DISEASE INVOLVING NATIVE CORONARY ARTERY OF NATIVE HEART WITHOUT ANGINA PECTORIS: Primary | ICD-10-CM

## 2019-04-29 DIAGNOSIS — E78.2 MIXED HYPERLIPIDEMIA: ICD-10-CM

## 2019-04-29 PROCEDURE — 93283 PRGRMG EVAL IMPLANTABLE DFB: CPT | Performed by: INTERNAL MEDICINE

## 2019-04-29 PROCEDURE — 99214 OFFICE O/P EST MOD 30 MIN: CPT | Performed by: INTERNAL MEDICINE

## 2019-04-29 NOTE — PROGRESS NOTES
[facility] at [facility]  Office visit  Abbe Palacios  1945  [phone]    VISIT DATE:  4/29/2019    PCP: Xavier Briones MD  [id number] [place] [id number]    CC:  Chief Complaint   Patient presents with   • Coronary Artery Disease       PROBLEM LIST:  1.  Coronary artery disease:  a.  Status post stenting of right coronary artery, bare-metal stent for acute myocardial infarction, 11/07/2007.  b. Status post elective stenting of proximal left anterior descending coronary artery, 11/14/2007.  c. Elina-infarction VF and post-infarction  ventricular ectopy with positive EP study culminating in dual-chamber ICD implantation, November 2007.  d. Recurrent chest pain with non-STEMI, status post cardiac catheterization, 01/24/2013, Dr. Jarrett:  PTCA and stenting of the first diagonal using  a 2.5 x 24 mm Promus drug-eluting stent, PTCA and stenting of the ramus intermedius using a 2.25 x 20 mm Promus drug-eluting stent.  2. Benign hypertension.  3. Hyperlipidemia.  4. Peripheral neuropathy.  5. Status post ICD generator change,  January 2014.     ASSESSMENT:   Diagnosis Plan   1. Coronary artery disease involving native coronary artery of native heart without angina pectoris     2. Mixed hyperlipidemia     3. Benign essential hypertension       Device interrogation:  Medtronic dual-chamber ICD  93 %Atrial pacing, sensed P-wave 2.8 mV, capture threshold 0.65 V at 0.4 ms, impedance 513 ohms  0% RV pacing, sensed R-wave 5.6 mV, capture threshold 1.375 mV at 0.4 ms, impedance 399 ohms  No arrhythmia      PLAN:  Coronary artery disease: Stable and asymptomatic. continue Plavix.  Continue high intensity statin therapy and current afterload reduction    Elina-infarct V. fib status post ICD implantation: Normal device interrogation.    Hyperlipidemia: Currently well controlled.  Continue current medical therapy, Goal LDL <100, ideally less than 70.    Hypertension: Goal less than 130/80,  "controlled.  Continue current medical therapy.    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      Subjective  Report stable functional capacity.  Denies chest pain, palpitations or dyspnea on exertion.  Blood pressures run less than 140/90 mmHg.    Denies myalgias on statin therapy.  He is compliant with medical therapy.  Device interrogation was negative for arrhythmia. Reviewed most recent fasting lipid panel.    PHYSICAL EXAMINATION:  Vitals:    04/29/19 1434   BP: 134/82   BP Location: Right arm   Patient Position: Sitting   Pulse: 77   Weight: 129 kg (284 lb)   Height: 198.1 cm (78\")     General Appearance:    Alert, cooperative, no distress, appears stated age   Head:    Normocephalic, without obvious abnormality, atraumatic   Eyes:    conjunctiva/corneas clear   Nose:   Nares normal, septum midline, mucosa normal, no drainage   Throat:   Lips, teeth and gums normal   Neck:   Supple, symmetrical, trachea midline, no carotid    bruit or JVD   Lungs:     Clear to auscultation bilaterally, respirations unlabored   Chest Wall:    No tenderness or deformity    Heart:    Regular rate and rhythm, S1 and S2 normal, no murmur, rub   or gallop, normal carotid impulse bilaterally without bruit.   Abdomen:     Soft, non-tender   Extremities:   Extremities normal, atraumatic, no cyanosis or edema   Pulses:   2+ and symmetric all extremities   Skin:   Skin color, texture, turgor normal, no rashes or lesions       Diagnostic Data:  Procedures  Lab Results   Component Value Date    CHLPL 160 04/11/2019    TRIG 146 04/11/2019    HDL 45 04/11/2019     Lab Results   Component Value Date    GLUCOSE 98 01/06/2014    BUN 14 04/11/2019    CREATININE 1.18 04/11/2019     04/11/2019    K 4.8 04/11/2019     04/11/2019    CO2 28.1 04/11/2019     Lab Results   Component Value Date    HGBA1C 6.42 (H) 04/11/2019     Lab Results   Component Value Date    WBC 11.05 (H) 01/06/2014    HGB 15.8 " 01/06/2014    HCT 45.1 01/06/2014     01/06/2014       Allergies  Allergies   Allergen Reactions   • Biaxin [Clarithromycin] Nausea Only   • Levaquin [Levofloxacin] Nausea Only   • Nitroglycerin Other (See Comments)     hypotension   • Penicillins Rash       Current Medications    Current Outpatient Medications:   •  acetaminophen (TYLENOL) 325 MG tablet, Take 650 mg by mouth As Needed for Mild Pain ., Disp: , Rfl:   •  atorvastatin (LIPITOR) 40 MG tablet, take 1 tablet by mouth once daily, Disp: 90 tablet, Rfl: 3  •  clopidogrel (PLAVIX) 75 MG tablet, TAKE 1 TABLET BY MOUTH ONCE DAILY, Disp: 90 tablet, Rfl: 0  •  ketoconazole (NIZORAL) 2 % cream, Apply  topically to the appropriate area as directed Daily., Disp: 15 g, Rfl: 1  •  lisinopril (PRINIVIL,ZESTRIL) 20 MG tablet, TAKE 1 TABLET BY MOUTH ONCE DAILY, Disp: 90 tablet, Rfl: 1  •  metoprolol tartrate (LOPRESSOR) 50 MG tablet, take 1 tablet by mouth every 12 hours, Disp: 180 tablet, Rfl: 3  •  Multiple Vitamins-Minerals (MULTIVITAMIN ADULT PO), Take  by mouth Daily., Disp: , Rfl:   •  Omega 3 1000 MG capsule, Take  by mouth Daily., Disp: , Rfl:   •  tamsulosin (FLOMAX) 0.4 MG capsule 24 hr capsule, TAKE 1 CAPSULE BY MOUTH ONCE DAILY. TAKE HALF AN HOUR AFTER THE SAME MEAL EACH DAY, Disp: 30 capsule, Rfl: 5  •  vitamin B-12 (CYANOCOBALAMIN) 100 MCG tablet, Take 50 mcg by mouth Daily., Disp: , Rfl:           ROS  Review of Systems   Cardiovascular: Negative for chest pain, dyspnea on exertion, irregular heartbeat and palpitations.   Respiratory: Negative for shortness of breath and sputum production.          SOCIAL HX  Social History     Socioeconomic History   • Marital status:      Spouse name: Not on file   • Number of children: Not on file   • Years of education: Not on file   • Highest education level: Not on file   Tobacco Use   • Smoking status: Never Smoker   • Smokeless tobacco: Never Used   • Tobacco comment: no passive smoke exposure    Substance and Sexual Activity   • Alcohol use: Yes     Types: 1 Glasses of wine, 1 Cans of beer per week     Comment: occassionally    • Drug use: No   • Sexual activity: Not Currently     Partners: Female       FAMILY HX  Family History   Problem Relation Age of Onset   • Hepatitis Mother    • Dementia Father    • Stroke Sister    • Hypertension Brother    • Heart attack Paternal Grandfather 70             Abbe Mendieta III, MD, FACC

## 2019-06-06 RX ORDER — ATORVASTATIN CALCIUM 40 MG/1
TABLET, FILM COATED ORAL
Qty: 90 TABLET | Refills: 3 | Status: SHIPPED | OUTPATIENT
Start: 2019-06-06 | End: 2020-06-08

## 2019-06-06 RX ORDER — LISINOPRIL AND HYDROCHLOROTHIAZIDE 12.5; 1 MG/1; MG/1
TABLET ORAL
Qty: 90 TABLET | Refills: 3 | Status: SHIPPED | OUTPATIENT
Start: 2019-06-06 | End: 2020-06-08

## 2019-06-28 RX ORDER — CLOPIDOGREL BISULFATE 75 MG/1
TABLET ORAL
Qty: 90 TABLET | Refills: 3 | Status: SHIPPED | OUTPATIENT
Start: 2019-06-28 | End: 2020-06-25

## 2019-06-28 RX ORDER — METOPROLOL TARTRATE 50 MG/1
TABLET, FILM COATED ORAL
Qty: 180 TABLET | Refills: 3 | Status: SHIPPED | OUTPATIENT
Start: 2019-06-28 | End: 2020-06-25

## 2019-09-09 RX ORDER — LISINOPRIL 20 MG/1
TABLET ORAL
Qty: 90 TABLET | Refills: 3 | Status: SHIPPED | OUTPATIENT
Start: 2019-09-09 | End: 2020-09-08

## 2019-09-11 ENCOUNTER — TELEPHONE (OUTPATIENT)
Dept: CARDIOLOGY | Facility: CLINIC | Age: 74
End: 2019-09-11

## 2019-09-11 NOTE — TELEPHONE ENCOUNTER
Pt LVPHILIPPE and needs a letter stating he may avoid the metal detectors at the airport with his pacemaker for his trip to [place].     When returning his call no answer, LVM to return my phone call. Will await return call.

## 2019-10-08 ENCOUNTER — LAB REQUISITION (OUTPATIENT)
Dept: LAB | Facility: HOSPITAL | Age: 74
End: 2019-10-08

## 2019-10-08 ENCOUNTER — OFFICE VISIT (OUTPATIENT)
Dept: INTERNAL MEDICINE | Facility: CLINIC | Age: 74
End: 2019-10-08

## 2019-10-08 VITALS
WEIGHT: 273 LBS | BODY MASS INDEX: 31.59 KG/M2 | SYSTOLIC BLOOD PRESSURE: 136 MMHG | HEART RATE: 76 BPM | DIASTOLIC BLOOD PRESSURE: 82 MMHG | TEMPERATURE: 97.2 F | HEIGHT: 78 IN

## 2019-10-08 DIAGNOSIS — Z00.00 ROUTINE GENERAL MEDICAL EXAMINATION AT A HEALTH CARE FACILITY: ICD-10-CM

## 2019-10-08 DIAGNOSIS — I10 BENIGN ESSENTIAL HYPERTENSION: ICD-10-CM

## 2019-10-08 DIAGNOSIS — E11.42 DIABETIC POLYNEUROPATHY ASSOCIATED WITH TYPE 2 DIABETES MELLITUS (HCC): ICD-10-CM

## 2019-10-08 DIAGNOSIS — Z00.00 MEDICARE ANNUAL WELLNESS VISIT, SUBSEQUENT: Primary | ICD-10-CM

## 2019-10-08 DIAGNOSIS — E78.2 MIXED HYPERLIPIDEMIA: ICD-10-CM

## 2019-10-08 PROCEDURE — G0439 PPPS, SUBSEQ VISIT: HCPCS | Performed by: NURSE PRACTITIONER

## 2019-10-08 PROCEDURE — 36415 COLL VENOUS BLD VENIPUNCTURE: CPT | Performed by: NURSE PRACTITIONER

## 2019-10-08 PROCEDURE — 96160 PT-FOCUSED HLTH RISK ASSMT: CPT | Performed by: NURSE PRACTITIONER

## 2019-10-08 NOTE — PROGRESS NOTES
The ABCs of the Annual Wellness Visit  Subsequent Medicare Wellness Visit    Chief Complaint   Patient presents with   • Annual Exam     Patient is fasting       Subjective   History of Present Illness:  Abbe Palacios is a 74 y.o. male who presents for a Subsequent Medicare Wellness Visit.    HEALTH RISK ASSESSMENT    Recent Hospitalizations:  No hospitalization(s) within the last year.    Current Medical Providers:  Patient Care Team:  Xavier Briones MD as PCP - General  Xavier Briones MD as PCP - Family Medicine    Smoking Status:  Social History     Tobacco Use   Smoking Status Never Smoker   Smokeless Tobacco Never Used   Tobacco Comment    no passive smoke exposure       Alcohol Consumption:  Social History     Substance and Sexual Activity   Alcohol Use Yes   • Alcohol/week: 0.0 - 1.2 oz    Comment: occassionally        Depression Screen:   PHQ-2/PHQ-9 Depression Screening 10/8/2019   Little interest or pleasure in doing things 0   Feeling down, depressed, or hopeless 0   Total Score 0       Fall Risk Screen:  MIK Fall Risk Assessment was completed, and patient is at MODERATE risk for falls. Assessment completed on:10/8/2019    Health Habits and Functional and Cognitive Screening:  Functional & Cognitive Status 10/8/2019   Do you have difficulty preparing food and eating? No   Do you have difficulty bathing yourself, getting dressed or grooming yourself? No   Do you have difficulty using the toilet? No   Do you have difficulty moving around from place to place? No   Do you have trouble with steps or getting out of a bed or a chair? No   Current Diet Well Balanced Diet   Dental Exam Up to date   Eye Exam Up to date   Exercise (times per week) 4 times per week   Current Exercise Activities Include Walking   Do you need help using the phone?  No   Are you deaf or do you have serious difficulty hearing?  No   Do you need help with transportation? No   Do you need help shopping? No   Do you need  help preparing meals?  No   Do you need help with housework?  No   Do you need help with laundry? No   Do you need help taking your medications? No   Do you need help managing money? No   Do you ever drive or ride in a car without wearing a seat belt? No   Have you felt unusual stress, anger or loneliness in the last month? No   Who do you live with? Spouse   If you need help, do you have trouble finding someone available to you? No   Do you have difficulty concentrating, remembering or making decisions? No         Does the patient have evidence of cognitive impairment? No    Asprin use counseling:Does not need ASA (and currently is not on it)    Age-appropriate Screening Schedule:  Refer to the list below for future screening recommendations based on patient's age, sex and/or medical conditions. Orders for these recommended tests are listed in the plan section. The patient has been provided with a written plan.    Health Maintenance   Topic Date Due   • URINE MICROALBUMIN  1945   • ZOSTER VACCINE (1 of 2) 06/02/2010   • PNEUMOCOCCAL VACCINES (65+ LOW/MEDIUM RISK) (2 of 2 - PPSV23) 09/23/2016   • INFLUENZA VACCINE  08/01/2019   • DIABETIC EYE EXAM  10/03/2019   • HEMOGLOBIN A1C  10/11/2019   • LIPID PANEL  04/11/2020   • COLONOSCOPY  12/02/2021   • TDAP/TD VACCINES (2 - Td) 09/27/2022          The following portions of the patient's history were reviewed and updated as appropriate: allergies, current medications, past family history, past medical history, past social history, past surgical history and problem list.    Outpatient Medications Prior to Visit   Medication Sig Dispense Refill   • Acetaminophen 500 MG coapsule Take 1,000 mg by mouth As Needed for Mild Pain .     • atorvastatin (LIPITOR) 40 MG tablet TAKE 1 TABLET BY MOUTH ONCE DAILY 90 tablet 3   • clopidogrel (PLAVIX) 75 MG tablet TAKE 1 TABLET BY MOUTH ONCE DAILY 90 tablet 3   • Cyanocobalamin (VITAMIN B-12 PO) Take 5,000 mcg by mouth Daily.     •  ketoconazole (NIZORAL) 2 % cream Apply  topically to the appropriate area as directed Daily. 15 g 1   • lisinopril (PRINIVIL,ZESTRIL) 20 MG tablet TAKE 1 TABLET BY MOUTH ONCE DAILY 90 tablet 3   • lisinopril-hydrochlorothiazide (PRINZIDE,ZESTORETIC) 10-12.5 MG per tablet TAKE 1 TABLET BY MOUTH ONCE DAILY 90 tablet 3   • metoprolol tartrate (LOPRESSOR) 50 MG tablet TAKE 1 TABLET BY MOUTH EVERY 12 HOURS 180 tablet 3   • Multiple Vitamins-Minerals (MULTIVITAMIN ADULT PO) Take 1 tablet by mouth Daily.     • Omega 3 1000 MG capsule Take 1 capsule by mouth Daily.     • tamsulosin (FLOMAX) 0.4 MG capsule 24 hr capsule TAKE 1 CAPSULE BY MOUTH ONCE DAILY. TAKE HALF AN HOUR AFTER THE SAME MEAL EACH DAY 30 capsule 5     No facility-administered medications prior to visit.        Patient Active Problem List   Diagnosis   • Coronary artery disease   • Peripheral neuropathy   • VF (ventricular fibrillation) (CMS/HCC)   • Hypertrophy of prostate without urinary obstruction   • Diabetic polyneuropathy associated with type 2 diabetes mellitus (CMS/HCC)   • Angina pectoris (CMS/HCC)   • Obesity   • Chronic systolic (congestive) heart failure (CMS/HCC)   • Mixed hyperlipidemia   • Encounter for screening for other disorder   • Diabetic peripheral angiopathy (CMS/HCC)   • Benign essential hypertension   • ASHD (arteriosclerotic heart disease)   • Seasonal allergic rhinitis   • Inflamed skin tag   • Left hip pain   • Encounter for screening   • Pain, knee       Advanced Care Planning:  Patient has an advance directive - a copy has not been provided. Have asked the patient to send this to us to add to record    Review of Systems   Constitutional: Negative for chills, fatigue and fever.   HENT: Negative for congestion, ear pain and sinus pressure.    Respiratory: Negative for cough, chest tightness, shortness of breath and wheezing.    Cardiovascular: Negative for chest pain and palpitations.   Gastrointestinal: Negative for abdominal  "pain, blood in stool and constipation.   Skin: Negative for color change.   Allergic/Immunologic: Positive for environmental allergies.   Neurological: Negative for dizziness, speech difficulty and headaches.   Psychiatric/Behavioral: Negative for confusion. The patient is not nervous/anxious.        Compared to one year ago, the patient feels his physical health is better.  Compared to one year ago, the patient feels his mental health is the same.    Reviewed chart for potential of high risk medication in the elderly: yes  Reviewed chart for potential of harmful drug interactions in the elderly:yes    Objective         Vitals:    10/08/19 0913   BP: 136/82   BP Location: Left arm   Patient Position: Sitting   Cuff Size: Large Adult   Pulse: 76   Temp: 97.2 °F (36.2 °C)   TempSrc: Temporal   Weight: 124 kg (273 lb)   Height: 198 cm (77.95\")   PainSc: 0-No pain       Body mass index is 31.59 kg/m².  Discussed the patient's BMI with him. The BMI is above average; BMI management plan is completed.    Physical Exam          Assessment/Plan   Medicare Risks and Personalized Health Plan  CMS Preventative Services Quick Reference  Immunizations Discussed/Encouraged (specific immunizations; Influenza and Shingrix )    The above risks/problems have been discussed with the patient.  Pertinent information has been shared with the patient in the After Visit Summary.  Follow up plans and orders are seen below in the Assessment/Plan Section.    Diagnoses and all orders for this visit:    1. Medicare annual wellness visit, subsequent (Primary)    2. Mixed hyperlipidemia  -     Lipid Panel; Future  -     TSH Rfx On Abnormal To Free T4; Future    3. Benign essential hypertension  -     CBC & Differential; Future  -     Comprehensive Metabolic Panel; Future  -     Microalbumin / Creatinine Urine Ratio - Urine, Clean Catch; Future    4. Diabetic polyneuropathy associated with type 2 diabetes mellitus (CMS/[facility])  -     Hemoglobin A1c; " Future      Follow Up:  No Follow-up on file.     An After Visit Summary and PPPS were given to the patient.

## 2019-10-08 NOTE — PATIENT INSTRUCTIONS
Medicare Wellness  Personal Prevention Plan of Service     Date of Office Visit:  10/08/2019  Encounter Provider:  KIRSTEN Chacko  Place of Service:  [facility]  Patient Name: Abbe Palacios  :  1945    As part of the Medicare Wellness portion of your visit today, we are providing you with this personalized preventive plan of services (PPPS). This plan is based upon recommendations of the [place] Preventive Services Task Force (USPSTF) and the Advisory Committee on Immunization Practices (ACIP).    This lists the preventive care services that should be considered, and provides dates of when you are due. Items listed as completed are up-to-date and do not require any further intervention.    Health Maintenance   Topic Date Due   • URINE MICROALBUMIN  1945   • ZOSTER VACCINE (1 of 2) 2010   • PNEUMOCOCCAL VACCINES (65+ LOW/MEDIUM RISK) (2 of 2 - PPSV23) 2016   • MEDICARE ANNUAL WELLNESS  2017   • INFLUENZA VACCINE  2019   • DIABETIC EYE EXAM  10/03/2019   • HEMOGLOBIN A1C  10/11/2019   • LIPID PANEL  2020   • COLONOSCOPY  2021   • TDAP/TD VACCINES (2 - Td) 2022       Orders Placed This Encounter   Procedures   • Comprehensive Metabolic Panel     Standing Status:   Future     Standing Expiration Date:   10/8/2020   • Lipid Panel     Standing Status:   Future     Standing Expiration Date:   10/8/2020   • TSH Rfx On Abnormal To Free T4     Standing Status:   Future     Standing Expiration Date:   10/8/2020   • Microalbumin / Creatinine Urine Ratio - Urine, Clean Catch     Standing Status:   Future     Standing Expiration Date:   10/8/2020   • Hemoglobin A1c     Standing Status:   Future     Standing Expiration Date:   10/8/2020   • CBC & Differential     Standing Status:   Future     Standing Expiration Date:   10/8/2020     Order Specific Question:   Manual Differential     Answer:   No       Return for Annual  physical, Labs this visit, Next scheduled follow up.

## 2019-10-09 LAB
ALBUMIN SERPL-MCNC: 4.7 G/DL (ref 3.5–5.2)
ALBUMIN/CREAT UR: 7.9 MG/G CREAT (ref 0–30)
ALBUMIN/GLOB SERPL: 1.8 G/DL
ALP SERPL-CCNC: 83 U/L (ref 39–117)
ALT SERPL-CCNC: 23 U/L (ref 1–41)
AST SERPL-CCNC: 24 U/L (ref 1–40)
BASOPHILS # BLD AUTO: 0.05 10*3/MM3 (ref 0–0.2)
BASOPHILS NFR BLD AUTO: 0.6 % (ref 0–1.5)
BILIRUB SERPL-MCNC: 0.6 MG/DL (ref 0.2–1.2)
BUN SERPL-MCNC: 12 MG/DL (ref 8–23)
BUN/CREAT SERPL: 10.5 (ref 7–25)
CALCIUM SERPL-MCNC: 9.8 MG/DL (ref 8.6–10.5)
CHLORIDE SERPL-SCNC: 100 MMOL/L (ref 98–107)
CHOLEST SERPL-MCNC: 159 MG/DL (ref 0–200)
CO2 SERPL-SCNC: 29.2 MMOL/L (ref 22–29)
CREAT SERPL-MCNC: 1.14 MG/DL (ref 0.76–1.27)
CREAT UR-MCNC: 148.1 MG/DL
EOSINOPHIL # BLD AUTO: 0.37 10*3/MM3 (ref 0–0.4)
EOSINOPHIL NFR BLD AUTO: 4.1 % (ref 0.3–6.2)
ERYTHROCYTE [DISTWIDTH] IN BLOOD BY AUTOMATED COUNT: 12.8 % (ref 12.3–15.4)
GLOBULIN SER CALC-MCNC: 2.6 GM/DL
GLUCOSE SERPL-MCNC: 120 MG/DL (ref 65–99)
HBA1C MFR BLD: 6.3 % (ref 4.8–5.6)
HCT VFR BLD AUTO: 47.2 % (ref 37.5–51)
HDLC SERPL-MCNC: 46 MG/DL (ref 40–60)
HGB BLD-MCNC: 16.1 G/DL (ref 13–17.7)
IMM GRANULOCYTES # BLD AUTO: 0.04 10*3/MM3 (ref 0–0.05)
IMM GRANULOCYTES NFR BLD AUTO: 0.4 % (ref 0–0.5)
LDLC SERPL CALC-MCNC: 80 MG/DL (ref 0–100)
LYMPHOCYTES # BLD AUTO: 2.7 10*3/MM3 (ref 0.7–3.1)
LYMPHOCYTES NFR BLD AUTO: 30.1 % (ref 19.6–45.3)
MCH RBC QN AUTO: 33.1 PG (ref 26.6–33)
MCHC RBC AUTO-ENTMCNC: 34.1 G/DL (ref 31.5–35.7)
MCV RBC AUTO: 97.1 FL (ref 79–97)
MICROALBUMIN UR-MCNC: 11.7 UG/ML
MONOCYTES # BLD AUTO: 0.67 10*3/MM3 (ref 0.1–0.9)
MONOCYTES NFR BLD AUTO: 7.5 % (ref 5–12)
NEUTROPHILS # BLD AUTO: 5.13 10*3/MM3 (ref 1.7–7)
NEUTROPHILS NFR BLD AUTO: 57.3 % (ref 42.7–76)
NRBC BLD AUTO-RTO: 0 /100 WBC (ref 0–0.2)
PLATELET # BLD AUTO: 264 10*3/MM3 (ref 140–450)
POTASSIUM SERPL-SCNC: 4.7 MMOL/L (ref 3.5–5.2)
PROT SERPL-MCNC: 7.3 G/DL (ref 6–8.5)
RBC # BLD AUTO: 4.86 10*6/MM3 (ref 4.14–5.8)
SODIUM SERPL-SCNC: 141 MMOL/L (ref 136–145)
TRIGL SERPL-MCNC: 163 MG/DL (ref 0–150)
TSH SERPL DL<=0.005 MIU/L-ACNC: 3.88 UIU/ML (ref 0.27–4.2)
VLDLC SERPL CALC-MCNC: 32.6 MG/DL
WBC # BLD AUTO: 8.96 10*3/MM3 (ref 3.4–10.8)

## 2019-10-23 ENCOUNTER — OFFICE VISIT (OUTPATIENT)
Dept: INTERNAL MEDICINE | Facility: CLINIC | Age: 74
End: 2019-10-23

## 2019-10-23 VITALS
SYSTOLIC BLOOD PRESSURE: 138 MMHG | BODY MASS INDEX: 32.28 KG/M2 | HEIGHT: 78 IN | DIASTOLIC BLOOD PRESSURE: 82 MMHG | HEART RATE: 88 BPM | WEIGHT: 279 LBS

## 2019-10-23 DIAGNOSIS — I50.22 CHRONIC SYSTOLIC (CONGESTIVE) HEART FAILURE (HCC): ICD-10-CM

## 2019-10-23 DIAGNOSIS — E11.51 DIABETIC PERIPHERAL ANGIOPATHY (HCC): Primary | ICD-10-CM

## 2019-10-23 DIAGNOSIS — I10 BENIGN ESSENTIAL HYPERTENSION: ICD-10-CM

## 2019-10-23 DIAGNOSIS — N18.30 CHRONIC KIDNEY DISEASE, STAGE III (MODERATE) (HCC): ICD-10-CM

## 2019-10-23 DIAGNOSIS — I20.9 ANGINA PECTORIS (HCC): ICD-10-CM

## 2019-10-23 DIAGNOSIS — E11.21 DIABETIC NEPHROPATHY ASSOCIATED WITH TYPE 2 DIABETES MELLITUS (HCC): ICD-10-CM

## 2019-10-23 DIAGNOSIS — E66.09 CLASS 1 OBESITY DUE TO EXCESS CALORIES WITH SERIOUS COMORBIDITY AND BODY MASS INDEX (BMI) OF 32.0 TO 32.9 IN ADULT: ICD-10-CM

## 2019-10-23 DIAGNOSIS — E11.42 DIABETIC POLYNEUROPATHY ASSOCIATED WITH TYPE 2 DIABETES MELLITUS (HCC): ICD-10-CM

## 2019-10-23 DIAGNOSIS — E78.2 MIXED HYPERLIPIDEMIA: ICD-10-CM

## 2019-10-23 PROCEDURE — 99214 OFFICE O/P EST MOD 30 MIN: CPT | Performed by: INTERNAL MEDICINE

## 2019-10-23 NOTE — PROGRESS NOTES
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  He comes in for follow-up of his diabetes, hypertension, chronic systolic CHF, angina, hyperlipidemia and obesity.  Last week he had an episode of lightheadedness.  His blood pressure at the time was 90/60, this has not recurred and he is felt well since.  No chest pain or dyspnea.  Since then he has been able to play golf and walk orthopnea.    Chronic Conditions:      Patient Active Problem List   Diagnosis   • Coronary artery disease   • Peripheral neuropathy   • VF (ventricular fibrillation) (CMS/HCC)   • Hypertrophy of prostate without urinary obstruction   • Diabetic polyneuropathy associated with type 2 diabetes mellitus (CMS/HCC)   • Angina pectoris (CMS/HCC)   • Obesity   • Chronic systolic (congestive) heart failure (CMS/HCC)   • Mixed hyperlipidemia   • Encounter for screening for other disorder   • Diabetic peripheral angiopathy (CMS/HCC)   • Benign essential hypertension   • ASHD (arteriosclerotic heart disease)   • Seasonal allergic rhinitis   • Inflamed skin tag   • Left hip pain   • Encounter for screening   • Pain, knee   • Diabetic nephropathy associated with type 2 diabetes mellitus (CMS/HCC)   • Chronic kidney disease, stage III (moderate) (CMS/HCC)        Past Medical History:   Diagnosis Date   • Benign hypertension    • Cancer (CMS/HCC) Basal cell   • Chronic systolic (congestive) heart failure (CMS/HCC)    • Coronary artery disease    • Diabetic nephropathy associated with type 2 diabetes mellitus (CMS/HCC) 10/27/2019   • Diabetic peripheral angiopathy (CMS/HCC)    • Diabetic polyneuropathy associated with type 2 diabetes mellitus (CMS/HCC)    • Hyperlipidemia     • Implantable cardioverter-defibrillator (ICD) generator end of life 01/01/2014    Status post ICD generator change   • Myocardial infarction (CMS/[facility]) 2013    NSTEMI   • Osteoarthritis of left hip    • Peripheral neuropathy    • Stroke (CMS/HCC)     PONTINE STROKE   • Ventricular tachycardia (CMS/HCC)        Past Surgical History:   Procedure Laterality Date   • APPENDECTOMY  1979   • CARDIAC DEFIBRILLATOR PLACEMENT  2007   • CARDIAC PACEMAKER PLACEMENT  2007    dual chamber   • CORONARY STENT PLACEMENT  2007    elective stents to LAD, CIRC   • CORONARY STENT PLACEMENT  2007    emergent L heart cath with stents RCA    • CORONARY STENT PLACEMENT      2013?  stents to diagonal and ramus   • HIP SURGERY Left 01/16/2018    total left hip arthroplasty, anterior approach   • SQUAMOUS CELL CARCINOMA EXCISION         Family History   Problem Relation Age of Onset   • Hepatitis Mother    • Dementia Father    • Stroke Sister    • Hypertension Brother    • Heart attack Paternal Grandfather 70       Social History     Socioeconomic History   • Marital status:      Spouse name: Not on file   • Number of children: Not on file   • Years of education: Not on file   • Highest education level: Not on file   Tobacco Use   • Smoking status: Never Smoker   • Smokeless tobacco: Never Used   • Tobacco comment: no passive smoke exposure   Substance and Sexual Activity   • Alcohol use: Yes     Alcohol/week: 0.0 - 1.2 oz     Comment: occassionally    • Drug use: No   • Sexual activity: Not Currently     Partners: Female       Allergies   Allergen Reactions   • Biaxin [Clarithromycin] Nausea Only   • Levaquin [Levofloxacin] Nausea Only   • Nitroglycerin Other (See Comments)     hypotension   • Penicillins Rash         Current Outpatient Medications:   •  Acetaminophen 500 MG coapsule, Take 1,000 mg by mouth As Needed for Mild Pain ., Disp: , Rfl:   •  atorvastatin (LIPITOR) 40 MG tablet, TAKE 1 TABLET BY MOUTH ONCE DAILY, Disp: 90  "tablet, Rfl: 3  •  clopidogrel (PLAVIX) 75 MG tablet, TAKE 1 TABLET BY MOUTH ONCE DAILY, Disp: 90 tablet, Rfl: 3  •  Cyanocobalamin (VITAMIN B-12 PO), Take 5,000 mcg by mouth Daily., Disp: , Rfl:   •  ketoconazole (NIZORAL) 2 % cream, Apply  topically to the appropriate area as directed Daily., Disp: 15 g, Rfl: 1  •  lisinopril (PRINIVIL,ZESTRIL) 20 MG tablet, TAKE 1 TABLET BY MOUTH ONCE DAILY, Disp: 90 tablet, Rfl: 3  •  lisinopril-hydrochlorothiazide (PRINZIDE,ZESTORETIC) 10-12.5 MG per tablet, TAKE 1 TABLET BY MOUTH ONCE DAILY, Disp: 90 tablet, Rfl: 3  •  metoprolol tartrate (LOPRESSOR) 50 MG tablet, TAKE 1 TABLET BY MOUTH EVERY 12 HOURS, Disp: 180 tablet, Rfl: 3  •  Multiple Vitamins-Minerals (MULTIVITAMIN ADULT PO), Take 1 tablet by mouth Daily., Disp: , Rfl:   •  Omega 3 1000 MG capsule, Take 1 capsule by mouth Daily., Disp: , Rfl:     Review of Systems   Constitutional: Negative for chills, fatigue and fever.   HENT: Negative for congestion, ear pain and sinus pressure.    Respiratory: Negative for cough, chest tightness, shortness of breath and wheezing.    Cardiovascular: Negative for chest pain and palpitations.   Gastrointestinal: Negative for abdominal pain, blood in stool and constipation.   Skin: Negative for color change.   Allergic/Immunologic: Negative for environmental allergies.   Neurological: Negative for dizziness, speech difficulty and headache.   Psychiatric/Behavioral: Negative for decreased concentration. The patient is not nervous/anxious.         Vital Signs  Vitals:    10/23/19 0759   BP: 138/82   BP Location: Left arm   Patient Position: Sitting   Cuff Size: Adult   Pulse: 88   Weight: 127 kg (279 lb)   Height: 198 cm (77.95\")   PainSc: 0-No pain       Physical Exam   Constitutional: He is oriented to person, place, and time. He appears well-developed and well-nourished.   HENT:   Head: Normocephalic and atraumatic.   Cardiovascular: Normal rate, regular rhythm and normal heart sounds. "   No murmur heard.  Pulmonary/Chest: Effort normal and breath sounds normal.   Neurological: He is alert and oriented to person, place, and time.   Psychiatric: He has a normal mood and affect.   Vitals reviewed.     Procedures    ACE III MINI             Assessment/Plan:    Abbe was seen today for diabetes, hypertension and hyperlipidemia.    Diagnoses and all orders for this visit:    Diabetic peripheral angiopathy (CMS/HCC)    Benign essential hypertension    Chronic systolic (congestive) heart failure (CMS/[facility])    Angina pectoris (CMS/[facility])    Mixed hyperlipidemia    Diabetic polyneuropathy associated with type 2 diabetes mellitus (CMS/[facility])    Class 1 obesity due to excess calories with serious comorbidity and body mass index (BMI) of 32.0 to 32.9 in adult    Diabetic nephropathy associated with type 2 diabetes mellitus (CMS/[facility])    Chronic kidney disease, stage III (moderate) (CMS/[facility])    Plan    A1c is well controlled at 6.3.  Continue managing this with low-carb diet and weight control.    Blood pressure is controlled.  Continue lisinopril-hydrochlorothiazide, lisinopril and metoprolol.  Should further episodes of hypotension occur reduce lisinopril dose.    There is currently no evidence of congestive heart failure.  Continue metoprolol and lisinopril.    Angina is asymptomatic.  Continue atorvastatin, clopidogrel, lisinopril and metoprolol.    Lipids are well controlled on atorvastatin and healthy diet.    BMI is 32, he is encouraged to continue working on weight loss through low-carb low-fat diet.    GFR is stable at 62, treatment remains control of blood glucose, blood pressure and avoidance of NSAIDs.      Plan of care reviewed with patient at the conclusion of today's visit. Education was provided regarding diagnosis, management, and any prescribed or recommended OTC medications.Patient verbalizes understanding of and agreement with management plan.         Xavier Briones MD

## 2019-10-27 PROBLEM — N18.30 CHRONIC KIDNEY DISEASE, STAGE III (MODERATE) (HCC): Status: ACTIVE | Noted: 2019-10-27

## 2019-10-27 PROBLEM — E11.21 DIABETIC NEPHROPATHY ASSOCIATED WITH TYPE 2 DIABETES MELLITUS (HCC): Status: ACTIVE | Noted: 2019-10-27

## 2019-11-04 ENCOUNTER — OFFICE VISIT (OUTPATIENT)
Dept: CARDIOLOGY | Facility: CLINIC | Age: 74
End: 2019-11-04

## 2019-11-04 VITALS
DIASTOLIC BLOOD PRESSURE: 60 MMHG | HEIGHT: 78 IN | WEIGHT: 277 LBS | SYSTOLIC BLOOD PRESSURE: 108 MMHG | BODY MASS INDEX: 32.05 KG/M2 | HEART RATE: 74 BPM | OXYGEN SATURATION: 97 %

## 2019-11-04 DIAGNOSIS — I49.01 VF (VENTRICULAR FIBRILLATION) (HCC): ICD-10-CM

## 2019-11-04 DIAGNOSIS — I50.22 CHRONIC SYSTOLIC (CONGESTIVE) HEART FAILURE (HCC): ICD-10-CM

## 2019-11-04 DIAGNOSIS — E78.2 MIXED HYPERLIPIDEMIA: ICD-10-CM

## 2019-11-04 DIAGNOSIS — I25.10 CORONARY ARTERY DISEASE INVOLVING NATIVE CORONARY ARTERY OF NATIVE HEART WITHOUT ANGINA PECTORIS: Primary | ICD-10-CM

## 2019-11-04 DIAGNOSIS — I10 BENIGN ESSENTIAL HYPERTENSION: ICD-10-CM

## 2019-11-04 PROCEDURE — 93283 PRGRMG EVAL IMPLANTABLE DFB: CPT | Performed by: INTERNAL MEDICINE

## 2019-11-04 PROCEDURE — 99214 OFFICE O/P EST MOD 30 MIN: CPT | Performed by: INTERNAL MEDICINE

## 2019-11-04 NOTE — PROGRESS NOTES
[facility]  Office visit  Abbe Palacios  1945  [phone]    VISIT DATE:  11/4/2019      PCP: Xavier Briones MD  [address] [id number]    CC:  Chief Complaint   Patient presents with   • Coronary Artery Disease       PROBLEM LIST:  1.  Coronary artery disease:  a.  Status post stenting of right coronary artery, bare-metal stent for acute myocardial infarction, 11/07/2007.  b. Status post elective stenting of proximal left anterior descending coronary artery, 11/14/2007.  c. Elina-infarction VF and post-infarction  ventricular ectopy with positive EP study culminating in dual-chamber ICD implantation, November 2007.  d. Recurrent chest pain with non-STEMI, status post cardiac catheterization, 01/24/2013, Dr. Jarrett:  PTCA and stenting of the first diagonal using  a 2.5 x 24 mm Promus drug-eluting stent, PTCA and stenting of the ramus intermedius using a 2.25 x 20 mm Promus drug-eluting stent.  2. Benign hypertension.  3. Hyperlipidemia.  4. Peripheral neuropathy.  5. Status post ICD generator change,  January 2014.     ASSESSMENT:   Diagnosis Plan   1. Coronary artery disease involving native coronary artery of native heart without angina pectoris     2. Chronic systolic (congestive) heart failure (CMS/HCC)     3. Mixed hyperlipidemia     4. Benign essential hypertension     5. VF (ventricular fibrillation) (CMS/HCC)       Device interrogation:  Medtronic dual-chamber ICD  93 %Atrial pacing, sensed P-wave 3.9 mV, capture threshold 0.5 V at 0.4 ms, impedance 513 ohms  Less than 1 % RV pacing, sensed R-wave 8.6 mV, capture threshold 0.75 mV at 0.4 ms, impedance 361 ohms  12-hour episode of A. fib.      PLAN:  Coronary artery disease: Stable and asymptomatic. continue Plavix.  Continue high intensity statin therapy and current afterload reduction    Elina-infarct V. fib status post ICD implantation: Normal device interrogation.    Hyperlipidemia:  "Currently well controlled.  Continue current medical therapy, Goal LDL <100, ideally less than 70.  Mild hypertriglyceridemia, continue dietary and lifestyle modifications.  Unclear clinical benefit of over-the-counter fish oil supplementation, instructed to discontinue.    Hypertension: Goal less than 130/80, controlled.  Continue current medical therapy.  Trending orthostasis clinically, currently mild.    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    Paroxysmal atrial fibrillation: New diagnosis: Chads vas score equal to 7.  Consultation with cardiology research to consider enrollment in [place].  Arranging for home monitoring.    Subjective  Report stable functional capacity.  Denies chest pain, palpitations or dyspnea on exertion.  Blood pressures run less than 130/80 mmHg.    Denies myalgias on statin therapy.  He is compliant with medical therapy.  Device interrogation was negative for arrhythmia. Reviewed most recent fasting lipid panel.  He had one mild episode of orthostasis.    PHYSICAL EXAMINATION:  Vitals:    11/04/19 0901   BP: 108/60   BP Location: Left arm   Patient Position: Sitting   Pulse: 74   SpO2: 97%   Weight: 126 kg (277 lb)   Height: 198.1 cm (78\")     General Appearance:    Alert, cooperative, no distress, appears stated age   Head:    Normocephalic, without obvious abnormality, atraumatic   Eyes:    conjunctiva/corneas clear   Nose:   Nares normal, septum midline, mucosa normal, no drainage   Throat:   Lips, teeth and gums normal   Neck:   Supple, symmetrical, trachea midline, no carotid    bruit or JVD   Lungs:     Clear to auscultation bilaterally, respirations unlabored   Chest Wall:    No tenderness or deformity    Heart:    Regular rate and rhythm, S1 and S2 normal, no murmur, rub   or gallop, normal carotid impulse bilaterally without bruit.   Abdomen:     Soft, non-tender   Extremities:   Extremities normal, atraumatic, no cyanosis or edema "   Pulses:   2+ and symmetric all extremities   Skin:   Skin color, texture, turgor normal, no rashes or lesions       Diagnostic Data:  Procedures  Lab Results   Component Value Date    CHLPL 159 10/08/2019    TRIG 163 (H) 10/08/2019    HDL 46 10/08/2019     Lab Results   Component Value Date    GLUCOSE 98 01/06/2014    BUN 12 10/08/2019    CREATININE 1.14 10/08/2019     10/08/2019    K 4.7 10/08/2019     10/08/2019    CO2 29.2 (H) 10/08/2019     Lab Results   Component Value Date    HGBA1C 6.30 (H) 10/08/2019     Lab Results   Component Value Date    WBC 8.96 10/08/2019    HGB 16.1 10/08/2019    HCT 47.2 10/08/2019     10/08/2019       Allergies  Allergies   Allergen Reactions   • Biaxin [Clarithromycin] Nausea Only   • Levaquin [Levofloxacin] Nausea Only   • Nitroglycerin Other (See Comments)     hypotension   • Penicillins Rash       Current Medications    Current Outpatient Medications:   •  Acetaminophen 500 MG coapsule, Take 1,000 mg by mouth As Needed for Mild Pain ., Disp: , Rfl:   •  atorvastatin (LIPITOR) 40 MG tablet, TAKE 1 TABLET BY MOUTH ONCE DAILY, Disp: 90 tablet, Rfl: 3  •  clopidogrel (PLAVIX) 75 MG tablet, TAKE 1 TABLET BY MOUTH ONCE DAILY, Disp: 90 tablet, Rfl: 3  •  Cyanocobalamin (VITAMIN B-12 PO), Take 5,000 mcg by mouth Daily., Disp: , Rfl:   •  lisinopril (PRINIVIL,ZESTRIL) 20 MG tablet, TAKE 1 TABLET BY MOUTH ONCE DAILY, Disp: 90 tablet, Rfl: 3  •  lisinopril-hydrochlorothiazide (PRINZIDE,ZESTORETIC) 10-12.5 MG per tablet, TAKE 1 TABLET BY MOUTH ONCE DAILY, Disp: 90 tablet, Rfl: 3  •  metoprolol tartrate (LOPRESSOR) 50 MG tablet, TAKE 1 TABLET BY MOUTH EVERY 12 HOURS, Disp: 180 tablet, Rfl: 3  •  Multiple Vitamins-Minerals (MULTIVITAMIN ADULT PO), Take 1 tablet by mouth Daily., Disp: , Rfl:   •  Omega 3 1000 MG capsule, Take 1 capsule by mouth Daily., Disp: , Rfl:           ROS  Review of Systems   Cardiovascular: Negative for chest pain, dyspnea on exertion, irregular  heartbeat and palpitations.   Respiratory: Negative for shortness of breath and sputum production.          SOCIAL HX  Social History     Socioeconomic History   • Marital status:      Spouse name: Not on file   • Number of children: Not on file   • Years of education: Not on file   • Highest education level: Not on file   Tobacco Use   • Smoking status: Never Smoker   • Smokeless tobacco: Never Used   • Tobacco comment: no passive smoke exposure   Substance and Sexual Activity   • Alcohol use: Yes     Alcohol/week: 0.0 - 1.2 oz     Comment: occassionally    • Drug use: No   • Sexual activity: Not Currently     Partners: Female       FAMILY HX  Family History   Problem Relation Age of Onset   • Hepatitis Mother    • Dementia Father    • Stroke Sister    • Hypertension Brother    • Heart attack Paternal Grandfather 70             Abbe Mendieta III, MD, FACC

## 2019-11-11 ENCOUNTER — CLINICAL SUPPORT NO REQUIREMENTS (OUTPATIENT)
Dept: CARDIOLOGY | Facility: CLINIC | Age: 74
End: 2019-11-11

## 2019-11-11 DIAGNOSIS — I50.22 CHRONIC SYSTOLIC (CONGESTIVE) HEART FAILURE (HCC): Primary | ICD-10-CM

## 2019-11-11 DIAGNOSIS — I49.01 VF (VENTRICULAR FIBRILLATION) (HCC): ICD-10-CM

## 2019-11-11 DIAGNOSIS — I25.10 CORONARY ARTERY DISEASE INVOLVING NATIVE CORONARY ARTERY OF NATIVE HEART WITHOUT ANGINA PECTORIS: ICD-10-CM

## 2019-12-18 ENCOUNTER — CLINICAL SUPPORT NO REQUIREMENTS (OUTPATIENT)
Dept: CARDIOLOGY | Facility: CLINIC | Age: 74
End: 2019-12-18

## 2019-12-18 DIAGNOSIS — I50.31 ACUTE DIASTOLIC CHF (CONGESTIVE HEART FAILURE) (HCC): ICD-10-CM

## 2019-12-18 PROCEDURE — 93296 REM INTERROG EVL PM/IDS: CPT | Performed by: INTERNAL MEDICINE

## 2020-03-18 ENCOUNTER — CLINICAL SUPPORT NO REQUIREMENTS (OUTPATIENT)
Dept: CARDIOLOGY | Facility: CLINIC | Age: 75
End: 2020-03-18

## 2020-03-18 DIAGNOSIS — I50.22 CHRONIC SYSTOLIC (CONGESTIVE) HEART FAILURE (HCC): ICD-10-CM

## 2020-03-18 PROCEDURE — 93296 REM INTERROG EVL PM/IDS: CPT | Performed by: INTERNAL MEDICINE

## 2020-03-18 PROCEDURE — 93295 DEV INTERROG REMOTE 1/2/MLT: CPT | Performed by: INTERNAL MEDICINE

## 2020-04-23 ENCOUNTER — OFFICE VISIT (OUTPATIENT)
Dept: INTERNAL MEDICINE | Facility: CLINIC | Age: 75
End: 2020-04-23

## 2020-04-23 DIAGNOSIS — I25.10 CORONARY ARTERY DISEASE INVOLVING NATIVE CORONARY ARTERY OF NATIVE HEART WITHOUT ANGINA PECTORIS: ICD-10-CM

## 2020-04-23 DIAGNOSIS — E11.21 DIABETIC NEPHROPATHY ASSOCIATED WITH TYPE 2 DIABETES MELLITUS (HCC): Primary | ICD-10-CM

## 2020-04-23 DIAGNOSIS — M79.642 LEFT HAND PAIN: ICD-10-CM

## 2020-04-23 DIAGNOSIS — E78.2 MIXED HYPERLIPIDEMIA: ICD-10-CM

## 2020-04-23 DIAGNOSIS — J30.1 SEASONAL ALLERGIC RHINITIS DUE TO POLLEN: ICD-10-CM

## 2020-04-23 DIAGNOSIS — I10 BENIGN ESSENTIAL HYPERTENSION: ICD-10-CM

## 2020-04-23 PROCEDURE — 99214 OFFICE O/P EST MOD 30 MIN: CPT | Performed by: INTERNAL MEDICINE

## 2020-04-23 NOTE — PROGRESS NOTES
[facility]     Abbe Palacios  1945   [id number]      Patient Care Team:  Xavier Briones MD as PCP - General  Xavier Briones MD as PCP - Family Medicine    Chief Complaint::   Chief Complaint   Patient presents with   • Diabetes   • Hypertension      You have chosen to receive care through a telehealth visit.  Do you consent to use a video/audio connection for your medical care today? 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  He states his allergies are acting up.  He has mild postnasal drainage.  He also complains of persistent discomfort in his left hand.  While in [place] earlier this year he was able to  a golf club but he finds that more difficult now.  He tries to to work the hand by opening and closing it which seems to help a little.  Otherwise he feels well.  He walks about 30 minutes a day.  Blood pressures have been controlled, 133/91, 136/86.  No chest pain or palpitations.  No fever, cough.    Chronic Conditions:      Patient Active Problem List   Diagnosis   • Coronary artery disease   • Peripheral neuropathy   • VF (ventricular fibrillation) (CMS/HCC)   • Hypertrophy of prostate without urinary obstruction   • Diabetic polyneuropathy associated with type 2 diabetes mellitus (CMS/HCC)   • Angina pectoris (CMS/HCC)   • Obesity   • Chronic systolic (congestive) heart failure (CMS/HCC)   • Mixed hyperlipidemia   • Encounter for screening for other disorder   • Diabetic peripheral angiopathy (CMS/HCC)   • Benign essential hypertension   • ASHD (arteriosclerotic heart disease)   • Seasonal allergic rhinitis   • Inflamed skin tag   • Left hip pain   • Encounter for screening   • Pain, knee   • Diabetic nephropathy associated with type 2 diabetes mellitus (CMS/HCC)   • Chronic kidney disease, stage III (moderate) (CMS/HCC)        Past Medical History:   Diagnosis Date   •  Benign hypertension    • Cancer (CMS/[facility]) Basal cell   • Chronic systolic (congestive) heart failure (CMS/[facility])    • Coronary artery disease    • Diabetic nephropathy associated with type 2 diabetes mellitus (CMS/[facility]) 10/27/2019   • Diabetic peripheral angiopathy (CMS/[facility])    • Diabetic polyneuropathy associated with type 2 diabetes mellitus (CMS/[facility])    • Hyperlipidemia    • Implantable cardioverter-defibrillator (ICD) generator end of life 01/01/2014    Status post ICD generator change   • Myocardial infarction (CMS/[facility]) 2013    NSTEMI   • Osteoarthritis of left hip    • Peripheral neuropathy    • Stroke (CMS/[facility])     PONTINE STROKE   • Ventricular tachycardia (CMS/[facility])        Past Surgical History:   Procedure Laterality Date   • APPENDECTOMY  1979   • CARDIAC DEFIBRILLATOR PLACEMENT  2007   • CARDIAC PACEMAKER PLACEMENT  2007    dual chamber   • CORONARY STENT PLACEMENT  2007    elective stents to LAD, CIRC   • CORONARY STENT PLACEMENT  2007    emergent L heart cath with stents RCA    • CORONARY STENT PLACEMENT      2013?  stents to diagonal and ramus   • HIP SURGERY Left 01/16/2018    total left hip arthroplasty, anterior approach   • SQUAMOUS CELL CARCINOMA EXCISION         Family History   Problem Relation Age of Onset   • Hepatitis Mother    • Dementia Father    • Stroke Sister    • Hypertension Brother    • Heart attack Paternal Grandfather 70       Social History     Socioeconomic History   • Marital status:      Spouse name: Not on file   • Number of children: Not on file   • Years of education: Not on file   • Highest education level: Not on file   Tobacco Use   • Smoking status: Never Smoker   • Smokeless tobacco: Never Used   • Tobacco comment: no passive smoke exposure   Substance and Sexual Activity   • Alcohol use: Yes     Alcohol/week: 0.0 - 2.0 standard drinks     Comment: occassionally    • Drug use: No   • Sexual activity: Not Currently     Partners: Female       Allergies   Allergen  Reactions   • Biaxin [Clarithromycin] Nausea Only   • Levaquin [Levofloxacin] Nausea Only   • Nitroglycerin Other (See Comments)     hypotension   • Penicillins Rash         Current Outpatient Medications:   •  Acetaminophen 500 MG coapsule, Take 1,000 mg by mouth As Needed for Mild Pain ., Disp: , Rfl:   •  atorvastatin (LIPITOR) 40 MG tablet, TAKE 1 TABLET BY MOUTH ONCE DAILY, Disp: 90 tablet, Rfl: 3  •  clopidogrel (PLAVIX) 75 MG tablet, TAKE 1 TABLET BY MOUTH ONCE DAILY, Disp: 90 tablet, Rfl: 3  •  Cyanocobalamin (VITAMIN B-12 PO), Take 5,000 mcg by mouth Daily., Disp: , Rfl:   •  diclofenac (VOLTAREN) 1 % gel gel, Apply 4 g topically to the appropriate area as directed 4 (Four) Times a Day As Needed (pain)., Disp: 100 g, Rfl: 2  •  lisinopril (PRINIVIL,ZESTRIL) 20 MG tablet, TAKE 1 TABLET BY MOUTH ONCE DAILY, Disp: 90 tablet, Rfl: 3  •  lisinopril-hydrochlorothiazide (PRINZIDE,ZESTORETIC) 10-12.5 MG per tablet, TAKE 1 TABLET BY MOUTH ONCE DAILY, Disp: 90 tablet, Rfl: 3  •  metoprolol tartrate (LOPRESSOR) 50 MG tablet, TAKE 1 TABLET BY MOUTH EVERY 12 HOURS, Disp: 180 tablet, Rfl: 3  •  Multiple Vitamins-Minerals (MULTIVITAMIN ADULT PO), Take 1 tablet by mouth Daily., Disp: , Rfl:     Review of Systems   Constitutional: Negative.  Negative for fever.   HENT: Positive for postnasal drip and rhinorrhea.    Respiratory: Negative.  Negative for cough, chest tightness and shortness of breath.    Cardiovascular: Negative.  Negative for chest pain.   Gastrointestinal: Negative for abdominal pain, blood in stool, constipation and diarrhea.   Musculoskeletal: Positive for arthralgias.        Vital Signs  There were no vitals filed for this visit.    Physical Exam   Constitutional: He is oriented to person, place, and time. He appears well-developed and well-nourished. No distress.   HENT:   Head: Normocephalic and atraumatic.   Eyes: Pupils are equal, round, and reactive to light. EOM are normal.   Neck: Normal range of  motion. No tracheal deviation present.   Pulmonary/Chest: Effort normal. No respiratory distress.   Musculoskeletal:   He is unable to completely close his left hand into a fist.  He extends without problems.  He has no limitation in the right hand.   Neurological: He is alert and oriented to person, place, and time. No cranial nerve deficit. He exhibits normal muscle tone. Coordination normal.   Psychiatric: He has a normal mood and affect. His behavior is normal. Judgment and thought content normal.      Procedures    ACE III MINI             Assessment/Plan:    Abbe was seen today for diabetes and hypertension.    Diagnoses and all orders for this visit:    Diabetic nephropathy associated with type 2 diabetes mellitus (CMS/[facility])  -     Hemoglobin A1c; Future    Benign essential hypertension  -     Comprehensive Metabolic Panel; Future    Coronary artery disease involving native coronary artery of native heart without angina pectoris    Seasonal allergic rhinitis due to pollen    Mixed hyperlipidemia  -     Lipid Panel; Future    Left hand pain    Other orders  -     diclofenac (VOLTAREN) 1 % gel gel; Apply 4 g topically to the appropriate area as directed 4 (Four) Times a Day As Needed (pain).    Plan    He will continue working on healthy diet and weight loss to control his blood glucose.  A1c and other labs will be obtained when current restrictions are lifted.    Blood pressures are well controlled on lisinopril-hydrochlorothiazide, lisinopril and metoprolol.    Coronary artery disease is asymptomatic on atorvastatin, clopidogrel, lisinopril and metoprolol.    He may use over-the-counter antihistamines or nasal steroids for allergies as needed.    He will continue atorvastatin and healthy diet to control his lipids.  Laboratory studies as noted above    He appears to have an early contracture of the left hand although he has greater limitation in closing the fist and opening.  For now I have suggested daily  heat range of motion exercises and a trial of diclofenac gel.  Once restrictions have been lifted can refer to hand physical therapy if indicated.    Plan of care reviewed with patient at the conclusion of today's visit. Education was provided regarding diagnosis, management, and any prescribed or recommended OTC medications.Patient verbalizes understanding of and agreement with management plan.         Xavier Briones MD

## 2020-05-12 ENCOUNTER — DOCUMENTATION (OUTPATIENT)
Dept: OTHER | Facility: OTHER | Age: 75
End: 2020-05-12

## 2020-05-12 NOTE — RESEARCH
Phone call placed to patient to complete six month [place] visit over the phone.  In person visit within window cancelled due to COVID 19.  Patient denies and stroke or stroke like symptoms since last seen, states he has not been hospitalized or in the emergency room, remains on same medications and doesn't think he has routinely missed any medications.  States he MAY have missed one dose of [place] meds but is not for sure. Informed Mr. Palacios that I will be shipping him his dispensed [place] study drugs overnight and he should receive them tomorrow.  He will bring empty bottles on next visit.

## 2020-05-22 ENCOUNTER — OFFICE VISIT (OUTPATIENT)
Dept: CARDIOLOGY | Facility: CLINIC | Age: 75
End: 2020-05-22

## 2020-05-22 VITALS
DIASTOLIC BLOOD PRESSURE: 74 MMHG | TEMPERATURE: 97.9 F | WEIGHT: 283.8 LBS | SYSTOLIC BLOOD PRESSURE: 104 MMHG | OXYGEN SATURATION: 94 % | HEIGHT: 78 IN | BODY MASS INDEX: 32.84 KG/M2 | HEART RATE: 90 BPM

## 2020-05-22 DIAGNOSIS — E78.2 MIXED HYPERLIPIDEMIA: ICD-10-CM

## 2020-05-22 DIAGNOSIS — I25.10 CORONARY ARTERY DISEASE INVOLVING NATIVE CORONARY ARTERY OF NATIVE HEART WITHOUT ANGINA PECTORIS: Primary | ICD-10-CM

## 2020-05-22 DIAGNOSIS — I10 BENIGN ESSENTIAL HYPERTENSION: ICD-10-CM

## 2020-05-22 DIAGNOSIS — I50.22 CHRONIC SYSTOLIC (CONGESTIVE) HEART FAILURE (HCC): ICD-10-CM

## 2020-05-22 PROCEDURE — 93283 PRGRMG EVAL IMPLANTABLE DFB: CPT | Performed by: INTERNAL MEDICINE

## 2020-05-22 PROCEDURE — 99214 OFFICE O/P EST MOD 30 MIN: CPT | Performed by: INTERNAL MEDICINE

## 2020-05-22 NOTE — PROGRESS NOTES
[facility] at [facility]  Office visit  Abbe Palacios  1945  [phone]    VISIT DATE:  5/22/2020      PCP: Xavier Briones MD  [address] [facility] [address] [address]    CC:  Chief Complaint   Patient presents with   • Coronary Artery Disease       PROBLEM LIST:  1.  Coronary artery disease:  a.  Status post stenting of right coronary artery, bare-metal stent for acute myocardial infarction, 11/07/2007.  b. Status post elective stenting of proximal left anterior descending coronary artery, 11/14/2007.  c. Elina-infarction VF and post-infarction  ventricular ectopy with positive EP study culminating in dual-chamber ICD implantation, November 2007.  d. Recurrent chest pain with non-STEMI, status post cardiac catheterization, 01/24/2013, Dr. Jarrett:  PTCA and stenting of the first diagonal using  a 2.5 x 24 mm Promus drug-eluting stent, PTCA and stenting of the ramus intermedius using a 2.25 x 20 mm Promus drug-eluting stent.  2. Benign hypertension.  3. Hyperlipidemia.  4. Peripheral neuropathy.  5. Status post ICD generator change,  January 2014.     ASSESSMENT:   Diagnosis Plan   1. Coronary artery disease involving native coronary artery of native heart without angina pectoris     2. Chronic systolic (congestive) heart failure (CMS/HCC)     3. Mixed hyperlipidemia     4. Benign essential hypertension       Device interrogation:  Medtronic dual-chamber ICD  95 %Atrial pacing, sensed P-wave 1.6 mV, capture threshold 0.5 V at 0.4 ms, impedance 475 ohms  Less than 1 % RV pacing, sensed R-wave 6.1 mV, capture threshold 1.1 mV at 0.4 ms, impedance 418 ohms  No A. fib.  1 nonsustained VT.      PLAN:  Coronary artery disease: Stable and asymptomatic. continue Plavix.  Continue high intensity statin therapy and current afterload reduction    Elina-infarct V. fib status post ICD implantation: Continue beta-blockade.    Hyperlipidemia: Currently well controlled.  Continue current medical  "therapy, Goal LDL <100, ideally less than 70.  Mild hypertriglyceridemia, continue dietary and lifestyle modifications.  Unclear clinical benefit of over-the-counter fish oil supplementation, instructed to discontinue.    Hypertension: Goal less than 130/80, controlled.  Continue current medical therapy.  Trending orthostasis clinically, currently mild.    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    Paroxysmal atrial fibrillation: Chads vas score equal to 7.  Currently enrolled in [employer].      Subjective  Report stable functional capacity.  Denies chest pain, palpitations or dyspnea on exertion.  Blood pressures run less than 130/80 mmHg.    Denies myalgias on statin therapy.  He is compliant with medical therapy.  Device interrogation was negative for arrhythmia.  Repeat fasting lipid panel pending next week.  Intermittently his systolic blood pressure of the below 100 and will have some mild transient fatigue.    PHYSICAL EXAMINATION:  Vitals:    05/22/20 0838   BP: 104/74   BP Location: Left arm   Patient Position: Sitting   Pulse: 90   Temp: 97.9 °F (36.6 °C)   SpO2: 94%   Weight: 129 kg (283 lb 12.8 oz)   Height: 198.1 cm (78\")     General Appearance:    Alert, cooperative, no distress, appears stated age   Head:    Normocephalic, without obvious abnormality, atraumatic   Eyes:    conjunctiva/corneas clear   Nose:   Nares normal, septum midline, mucosa normal, no drainage   Throat:   Lips, teeth and gums normal   Neck:   Supple, symmetrical, trachea midline, no carotid    bruit or JVD   Lungs:     Clear to auscultation bilaterally, respirations unlabored   Chest Wall:    No tenderness or deformity    Heart:    Regular rate and rhythm, S1 and S2 normal, no murmur, rub   or gallop, normal carotid impulse bilaterally without bruit.   Abdomen:     Soft, non-tender   Extremities:   Extremities normal, atraumatic, no cyanosis or edema   Pulses:   2+ and symmetric all " extremities   Skin:   Skin color, texture, turgor normal, no rashes or lesions       Diagnostic Data:  Procedures  Lab Results   Component Value Date    CHLPL 159 10/08/2019    TRIG 163 (H) 10/08/2019    HDL 46 10/08/2019     Lab Results   Component Value Date    GLUCOSE 98 01/06/2014    BUN 12 10/08/2019    CREATININE 1.14 10/08/2019     10/08/2019    K 4.7 10/08/2019     10/08/2019    CO2 29.2 (H) 10/08/2019     Lab Results   Component Value Date    HGBA1C 6.30 (H) 10/08/2019     Lab Results   Component Value Date    WBC 8.96 10/08/2019    HGB 16.1 10/08/2019    HCT 47.2 10/08/2019     10/08/2019       Allergies  Allergies   Allergen Reactions   • Biaxin [Clarithromycin] Nausea Only   • Clindamycin/Lincomycin Other (See Comments)     Caused incontinence   • Levaquin [Levofloxacin] Nausea Only   • Nitroglycerin Other (See Comments)     hypotension   • Penicillins Rash       Current Medications    Current Outpatient Medications:   •  Acetaminophen 500 MG coapsule, Take 1,000 mg by mouth As Needed for Mild Pain ., Disp: , Rfl:   •  apixaban (ELIQUIS) 5 MG tablet tablet, Take 5 mg by mouth 2 (Two) Times a Day. Currently in a trial and it is eliquis or placebo.  Patient is not sure ., Disp: , Rfl:   •  atorvastatin (LIPITOR) 40 MG tablet, TAKE 1 TABLET BY MOUTH ONCE DAILY, Disp: 90 tablet, Rfl: 3  •  clopidogrel (PLAVIX) 75 MG tablet, TAKE 1 TABLET BY MOUTH ONCE DAILY, Disp: 90 tablet, Rfl: 3  •  Cyanocobalamin (VITAMIN B-12 PO), Take 5,000 mcg by mouth Daily., Disp: , Rfl:   •  diclofenac (VOLTAREN) 1 % gel gel, Apply 4 g topically to the appropriate area as directed 4 (Four) Times a Day As Needed (pain)., Disp: 100 g, Rfl: 2  •  lisinopril (PRINIVIL,ZESTRIL) 20 MG tablet, TAKE 1 TABLET BY MOUTH ONCE DAILY, Disp: 90 tablet, Rfl: 3  •  lisinopril-hydrochlorothiazide (PRINZIDE,ZESTORETIC) 10-12.5 MG per tablet, TAKE 1 TABLET BY MOUTH ONCE DAILY, Disp: 90 tablet, Rfl: 3  •  metoprolol tartrate  (LOPRESSOR) 50 MG tablet, TAKE 1 TABLET BY MOUTH EVERY 12 HOURS, Disp: 180 tablet, Rfl: 3  •  Multiple Vitamins-Minerals (MULTIVITAMIN ADULT PO), Take 1 tablet by mouth Daily., Disp: , Rfl:   •  Probiotic Product (PROBIOTIC-10 PO), Take  by mouth Daily., Disp: , Rfl:           ROS  Review of Systems   Cardiovascular: Negative for chest pain, dyspnea on exertion, irregular heartbeat and palpitations.   Respiratory: Negative for shortness of breath and sputum production.          SOCIAL HX  Social History     Socioeconomic History   • Marital status:      Spouse name: Not on file   • Number of children: Not on file   • Years of education: Not on file   • Highest education level: Not on file   Tobacco Use   • Smoking status: Never Smoker   • Smokeless tobacco: Never Used   • Tobacco comment: no passive smoke exposure   Substance and Sexual Activity   • Alcohol use: Yes     Alcohol/week: 0.0 - 2.0 standard drinks     Comment: occassionally    • Drug use: No   • Sexual activity: Not Currently     Partners: Female       FAMILY HX  Family History   Problem Relation Age of Onset   • Hepatitis Mother    • Dementia Father    • Stroke Sister    • Hypertension Brother    • Heart attack Paternal Grandfather 70             Abbe Mendieta III, MD, [facility]

## 2020-05-28 ENCOUNTER — LAB (OUTPATIENT)
Dept: LAB | Facility: HOSPITAL | Age: 75
End: 2020-05-28

## 2020-05-28 DIAGNOSIS — E11.21 DIABETIC NEPHROPATHY ASSOCIATED WITH TYPE 2 DIABETES MELLITUS (HCC): ICD-10-CM

## 2020-05-28 DIAGNOSIS — I10 BENIGN ESSENTIAL HYPERTENSION: ICD-10-CM

## 2020-05-28 DIAGNOSIS — E78.2 MIXED HYPERLIPIDEMIA: ICD-10-CM

## 2020-05-28 LAB
ALBUMIN SERPL-MCNC: 4.6 G/DL (ref 3.5–5.2)
ALBUMIN/GLOB SERPL: 1.8 G/DL
ALP SERPL-CCNC: 68 U/L (ref 39–117)
ALT SERPL-CCNC: 26 U/L (ref 1–41)
AST SERPL-CCNC: 29 U/L (ref 1–40)
BILIRUB SERPL-MCNC: 0.8 MG/DL (ref 0.2–1.2)
BUN SERPL-MCNC: 28 MG/DL (ref 8–23)
BUN/CREAT SERPL: 20.9 (ref 7–25)
CALCIUM SERPL-MCNC: 9.4 MG/DL (ref 8.6–10.5)
CHLORIDE SERPL-SCNC: 104 MMOL/L (ref 98–107)
CHOLEST SERPL-MCNC: 149 MG/DL (ref 0–200)
CO2 SERPL-SCNC: 27.2 MMOL/L (ref 22–29)
CREAT SERPL-MCNC: 1.34 MG/DL (ref 0.76–1.27)
GLOBULIN SER CALC-MCNC: 2.5 GM/DL
GLUCOSE SERPL-MCNC: 150 MG/DL (ref 65–99)
HBA1C MFR BLD: 6.85 % (ref 4.8–5.6)
HDLC SERPL-MCNC: 39 MG/DL (ref 40–60)
LDLC SERPL CALC-MCNC: 66 MG/DL (ref 0–100)
POTASSIUM SERPL-SCNC: 4.4 MMOL/L (ref 3.5–5.2)
PROT SERPL-MCNC: 7.1 G/DL (ref 6–8.5)
SODIUM SERPL-SCNC: 140 MMOL/L (ref 136–145)
TRIGL SERPL-MCNC: 221 MG/DL (ref 0–150)
VLDLC SERPL CALC-MCNC: 44.2 MG/DL (ref 5–40)

## 2020-06-08 RX ORDER — LISINOPRIL AND HYDROCHLOROTHIAZIDE 12.5; 1 MG/1; MG/1
TABLET ORAL
Qty: 90 TABLET | Refills: 1 | Status: SHIPPED | OUTPATIENT
Start: 2020-06-08 | End: 2020-10-12

## 2020-06-08 RX ORDER — ATORVASTATIN CALCIUM 40 MG/1
TABLET, FILM COATED ORAL
Qty: 90 TABLET | Refills: 1 | Status: SHIPPED | OUTPATIENT
Start: 2020-06-08 | End: 2020-12-02 | Stop reason: SDUPTHER

## 2020-06-25 RX ORDER — CLOPIDOGREL BISULFATE 75 MG/1
TABLET ORAL
Qty: 90 TABLET | Refills: 1 | Status: SHIPPED | OUTPATIENT
Start: 2020-06-25 | End: 2020-12-28 | Stop reason: SDUPTHER

## 2020-06-25 RX ORDER — METOPROLOL TARTRATE 50 MG/1
TABLET, FILM COATED ORAL
Qty: 180 TABLET | Refills: 1 | Status: SHIPPED | OUTPATIENT
Start: 2020-06-25 | End: 2020-12-30 | Stop reason: SDUPTHER

## 2020-08-11 ENCOUNTER — TELEPHONE (OUTPATIENT)
Dept: CARDIOLOGY | Facility: CLINIC | Age: 75
End: 2020-08-11

## 2020-08-11 NOTE — TELEPHONE ENCOUNTER
is planning on doing a tooth extraction with grafting for a dental implant. Patient is on Plavix and Eliquis. Requesting how long to hold medication before procedure? Please advise.

## 2020-08-11 NOTE — TELEPHONE ENCOUNTER
Hold Plavix 5 days prior to procedure.  Hold Eliquis 2 days prior to procedure.  He is in the [place] trial, we need to let cardiology research know

## 2020-09-08 RX ORDER — LISINOPRIL 20 MG/1
TABLET ORAL
Qty: 90 TABLET | Refills: 1 | Status: SHIPPED | OUTPATIENT
Start: 2020-09-08 | End: 2020-11-04

## 2020-10-01 ENCOUNTER — TELEPHONE (OUTPATIENT)
Dept: CARDIOLOGY | Facility: CLINIC | Age: 75
End: 2020-10-01

## 2020-10-01 RX ORDER — LISINOPRIL 10 MG/1
10 TABLET ORAL EVERY MORNING
Qty: 90 TABLET | Refills: 1 | Status: SHIPPED | OUTPATIENT
Start: 2020-10-01 | End: 2021-03-08 | Stop reason: SDUPTHER

## 2020-10-01 RX ORDER — HYDROCHLOROTHIAZIDE 25 MG/1
12.5 TABLET ORAL DAILY
Qty: 45 TABLET | Refills: 1 | Status: SHIPPED | OUTPATIENT
Start: 2020-10-01 | End: 2021-03-29 | Stop reason: SDUPTHER

## 2020-10-01 NOTE — TELEPHONE ENCOUNTER
Pharmacy does not have Lisinopril-Hctz 10-12.5 mg in stock. They are requesting each prescription be sent in separately.

## 2020-10-12 ENCOUNTER — OFFICE VISIT (OUTPATIENT)
Dept: INTERNAL MEDICINE | Facility: CLINIC | Age: 75
End: 2020-10-12

## 2020-10-12 VITALS
BODY MASS INDEX: 32.88 KG/M2 | HEIGHT: 78 IN | DIASTOLIC BLOOD PRESSURE: 70 MMHG | WEIGHT: 284.2 LBS | HEART RATE: 88 BPM | SYSTOLIC BLOOD PRESSURE: 102 MMHG | TEMPERATURE: 96.6 F

## 2020-10-12 DIAGNOSIS — E78.2 MIXED HYPERLIPIDEMIA: ICD-10-CM

## 2020-10-12 DIAGNOSIS — N18.30 STAGE 3 CHRONIC KIDNEY DISEASE, UNSPECIFIED WHETHER STAGE 3A OR 3B CKD (HCC): ICD-10-CM

## 2020-10-12 DIAGNOSIS — Z00.00 MEDICARE ANNUAL WELLNESS VISIT, SUBSEQUENT: Primary | ICD-10-CM

## 2020-10-12 DIAGNOSIS — E11.42 DIABETIC POLYNEUROPATHY ASSOCIATED WITH TYPE 2 DIABETES MELLITUS (HCC): ICD-10-CM

## 2020-10-12 PROCEDURE — G0439 PPPS, SUBSEQ VISIT: HCPCS | Performed by: NURSE PRACTITIONER

## 2020-10-12 PROCEDURE — 96160 PT-FOCUSED HLTH RISK ASSMT: CPT | Performed by: NURSE PRACTITIONER

## 2020-10-12 RX ORDER — CETIRIZINE HYDROCHLORIDE 10 MG/1
10 TABLET ORAL NIGHTLY
COMMUNITY

## 2020-10-12 NOTE — PATIENT INSTRUCTIONS
Understanding Your Risk for Falls  Each year, millions of people have serious injuries from falls. It is important to understand your risk for falling. Talk with your health care provider about your risk and what you can do to lower it. There are actions you can take at home to lower your risk.  If you do have a serious fall, make sure you tell your health care provider. Falling once raises your risk for falling again.  How can falls affect me?  Serious injuries from falls are common. These include:  · Broken bones, such as hip fractures.  · Head injuries, such as traumatic brain injuries (TBI).  Fear of falling can also cause you to avoid activities and stay at home. This can make your muscles weaker and actually raise your risk for a fall.  What can increase my risk?  There are a number of risk factors that increase your risk for falling. The more risk factors you have, the higher your risk for falling. Serious injuries from a fall most often happen to people older than age 65. Children and young adults ages 15-29 are also at higher risk.  Common risk factors include:  · Weakness in the lower body.  · Lack (deficiency) of vitamin D.  · Being generally weak or confused due to long-term (chronic) illness.  · Dizziness or balance problems.  · Poor vision.  · Medicines that cause dizziness or drowsiness. These can include medicines for your blood pressure, heart, anxiety, insomnia, or edema, as well as pain medicines and muscle relaxants.  Other risk factors include:  · Drinking alcohol.  · Having had a fall in the past.  · Having depression.  · Foot pain or improper footwear.  · Working at a dangerous job.  · Having any of the following in your home:  ? Tripping hazards, such as floor clutter or loose rugs.  ? Poor lighting.  ? Pets or clutter.  · Dementia or memory loss.  What actions can I take to lower my risk of falling?         Physical activity  Maintain physical fitness. Do strength and balance exercises.  Consider taking a regular class to build strength and balance. Yoga and al chi are good options.  Vision  Have your eyes checked every year and your vision prescription updated as needed.  Walking aids and footwear  · Wear nonskid shoes. Do not wear high heels.  · Do not walk around the house in socks or slippers.  · Use a cane or walker as told by your health care provider.  Home safety  · Attach secure railings on both sides of your stairs.  · Install grab bars for your tub, shower, and toilet. Use a bath mat in your tub or shower.  · Use good lighting in all rooms. Keep a flashlight near your bed.  · Make sure there is a clear path from your bed to the bathroom. Use night-lights.  · Do not use throw rugs. Make sure all carpeting is taped or tacked down securely.  · Remove all clutter from walkways and stairways, including extension cords.  · Repair uneven or broken steps.  · Avoid walking on icy or slippery surfaces. Walk on the grass instead of on icy or slick sidewalks. Where you can, use ice melt to get rid of ice on walkways.  · Use a cordless phone.  Questions to ask your health care provider  · Can you help me check my risk for a fall?  · Do any of my medicines make me more likely to fall?  · Should I take a vitamin D supplement?  · What exercises can I do to improve my strength and balance?  · Should I make an appointment to have my vision checked?  · Do I need a bone density test to check for weak bones or osteoporosis?  · Would it help to use a cane or a walker?  Where to find more information  · Centers for Disease Control and Prevention, STEADI: www.cdc.gov  · Community-Based Fall Prevention Programs: www.cdc.gov  · National [place] on Aging: xg6uihv.cornelio.nih.gov  Contact a health care provider if:  · You fall at home.  · You are afraid of falling at home.  · You feel weak, drowsy, or dizzy.  Summary  · People 65 and older are at high risk for falling. However, older people are not the only ones  injured in falls. Children and young adults have a higher-than-normal risk too.  · Talk with your health care provider about your risks for falling and how to lower those risks.  · Taking certain precautions at home can lower your risk for falling.  · If you fall, always tell your health care provider.  This information is not intended to replace advice given to you by your health care provider. Make sure you discuss any questions you have with your health care provider.  Document Released: 08/01/2018 Document Revised: 06/24/2020 Document Reviewed: 06/24/2020  Elsevier Patient Education © 2020 Elsevier Inc.  BMI for Adults  What is BMI?  Body mass index (BMI) is a number that is calculated from a person's weight and height. BMI can help estimate how much of a person's weight is composed of fat. BMI does not measure body fat directly. Rather, it is an alternative to procedures that directly measure body fat, which can be difficult and expensive.  BMI can help identify people who may be at higher risk for certain medical problems.  What are BMI measurements used for?  BMI is used as a screening tool to identify possible weight problems. It helps determine whether a person is obese, overweight, a healthy weight, or underweight.  BMI is useful for:  · Identifying a weight problem that may be related to a medical condition or may increase the risk for medical problems.  · Promoting changes, such as changes in diet and exercise, to help reach a healthy weight. BMI screening can be repeated to see if these changes are working.  How is BMI calculated?  BMI involves measuring your weight in relation to your height. Both height and weight are measured, and the BMI is calculated from those numbers. This can be done either in English (U.S[place] or metric measurements. Note that charts and online BMI calculators are available to help you find your BMI quickly and easily without having to do these calculations yourself.  To calculate  "your BMI in English (U.S[place] measurements:    1. Measure your weight in pounds (lb).  2. Multiply the number of pounds by 703.  ? For example, for a person who weighs 180 lb, multiply that number by 703, which equals 126,540.  3. Measure your height in inches. Then multiply that number by itself to get a measurement called \"inches squared.\"  ? For example, for a person who is 70 inches tall, the \"inches squared\" measurement is 70 inches x 70 inches, which equals 4,900 inches squared.  4. Divide the total from step 2 (number of lb x 703) by the total from step 3 (inches squared): 126,540 ÷ 4,900 = 25.8. This is your BMI.  To calculate your BMI in metric measurements:  1. Measure your weight in kilograms (kg).  2. Measure your height in meters (m). Then multiply that number by itself to get a measurement called \"meters squared.\"  ? For example, for a person who is 1.75 m tall, the \"meters squared\" measurement is 1.75 m x 1.75 m, which is equal to 3.1 meters squared.  3. Divide the number of kilograms (your weight) by the meters squared number. In this example: 70 ÷ 3.1 = 22.6. This is your BMI.  What do the results mean?  BMI charts are used to identify whether you are underweight, normal weight, overweight, or obese. The following guidelines will be used:  · Underweight: BMI less than 18.5.  · Normal weight: BMI between 18.5 and 24.9.  · Overweight: BMI between 25 and 29.9.  · Obese: BMI of 30 or above.  Keep these notes in mind:  · Weight includes both fat and muscle, so someone with a muscular build, such as an athlete, may have a BMI that is higher than 24.9. In cases like these, BMI is not an accurate measure of body fat.  · To determine if excess body fat is the cause of a BMI of 25 or higher, further assessments may need to be done by a health care provider.  · BMI is usually interpreted in the same way for men and women.  Where to find more information  For more information about BMI, including tools to quickly " calculate your BMI, go to these websites:  · Centers for Disease Control and Prevention: www.cdc.gov  · American Heart Association: www.heart.org  · National Heart, Lung, and Blood [place]: www.nhlbi.nih.gov  Summary  · Body mass index (BMI) is a number that is calculated from a person's weight and height.  · BMI may help estimate how much of a person's weight is composed of fat. BMI can help identify those who may be at higher risk for certain medical problems.  · BMI can be measured using English measurements or metric measurements.  · BMI charts are used to identify whether you are underweight, normal weight, overweight, or obese.  This information is not intended to replace advice given to you by your health care provider. Make sure you discuss any questions you have with your health care provider.  Document Released: 2005 Document Revised: 2020 Document Reviewed: 2020  [employer] Patient Education ©  [employer]    Medicare Wellness  Personal Prevention Plan of Service     Date of Office Visit:  10/12/2020  Encounter Provider:  KIRSTEN Chacko  Place of Service:  [facility]  Patient Name: Abbe Palacios  :  1945    As part of the Medicare Wellness portion of your visit today, we are providing you with this personalized preventive plan of services (PPPS). This plan is based upon recommendations of the [place] Preventive Services Task Force (USPSTF) and the Advisory Committee on Immunization Practices (ACIP).    This lists the preventive care services that should be considered, and provides dates of when you are due. Items listed as completed are up-to-date and do not require any further intervention.    Health Maintenance   Topic Date Due   • ZOSTER VACCINE (2 of 3) 2010   • HEPATITIS C SCREENING  2017   • DIABETIC EYE EXAM  10/03/2019   • INFLUENZA VACCINE  2020   • ANNUAL WELLNESS VISIT  10/08/2020   • URINE  MICROALBUMIN  10/08/2020   • HEMOGLOBIN A1C  11/28/2020   • LIPID PANEL  05/28/2021   • COLONOSCOPY  12/02/2021   • TDAP/TD VACCINES (2 - Td) 09/27/2022   • Pneumococcal Vaccine 65+  Completed       Orders Placed This Encounter   Procedures   • Comprehensive Metabolic Panel     Standing Status:   Future     Standing Expiration Date:   10/12/2021   • Lipid Panel     Standing Status:   Future     Standing Expiration Date:   10/12/2021   • TSH Rfx On Abnormal To Free T4     Standing Status:   Future     Standing Expiration Date:   10/12/2021   • Microalbumin / Creatinine Urine Ratio - Urine, Clean Catch     Standing Status:   Future     Standing Expiration Date:   10/12/2021   • Vitamin B12     Standing Status:   Future     Standing Expiration Date:   10/12/2021   • Vitamin D 25 Hydroxy     Standing Status:   Future     Standing Expiration Date:   10/12/2021   • Hemoglobin A1c     Standing Status:   Future     Standing Expiration Date:   10/12/2021   • CBC & Differential     Standing Status:   Future     Standing Expiration Date:   10/12/2021     Order Specific Question:   Manual Differential     Answer:   No       No follow-ups on file.

## 2020-10-12 NOTE — PROGRESS NOTES
The ABCs of the Annual Wellness Visit  Subsequent Medicare Wellness Visit    Chief Complaint   Patient presents with   • Annual Exam     Patient is not fasting        Subjective   History of Present Illness:  Abbe Palacios is a 75 y.o. male who presents for a Subsequent Medicare Wellness Visit.    Home bp 150/105 ,106/71, 125/85 120/80 at home, when he picked up hctz last week he did not know to take 1/2 tab but will start that tomorrow.      HEALTH RISK ASSESSMENT    Recent Hospitalizations:  No hospitalization(s) within the last year.    Current Medical Providers:  Patient Care Team:  Xavier Briones MD as PCP - General  Xavier Briones MD as PCP - Family Medicine    Smoking Status:  Social History     Tobacco Use   Smoking Status Never Smoker   Smokeless Tobacco Never Used   Tobacco Comment    no passive smoke exposure       Alcohol Consumption:  Social History     Substance and Sexual Activity   Alcohol Use Yes   • Frequency: Monthly or less   • Drinks per session: 1 or 2   • Binge frequency: Never    Comment: occassionally        Depression Screen:   PHQ-2/PHQ-9 Depression Screening 10/12/2020   Little interest or pleasure in doing things 0   Feeling down, depressed, or hopeless 1   Total Score 1       Fall Risk Screen:  STEADI Fall Risk Assessment was completed, and patient is at MODERATE risk for falls. Assessment completed on:10/12/2020    Health Habits and Functional and Cognitive Screening:  Functional & Cognitive Status 10/12/2020   Do you have difficulty preparing food and eating? No   Do you have difficulty bathing yourself, getting dressed or grooming yourself? No   Do you have difficulty using the toilet? No   Do you have difficulty moving around from place to place? No   Do you have trouble with steps or getting out of a bed or a chair? No   Current Diet Well Balanced Diet   Dental Exam Up to date   Eye Exam Up to date   Exercise (times per week) 4 times per week   Current Exercise  Activities Include Walking   Do you need help using the phone?  No   Are you deaf or do you have serious difficulty hearing?  No   Do you need help with transportation? No   Do you need help shopping? No   Do you need help preparing meals?  No   Do you need help with housework?  No   Do you need help with laundry? No   Do you need help taking your medications? No   Do you need help managing money? No   Do you ever drive or ride in a car without wearing a seat belt? No   Have you felt unusual stress, anger or loneliness in the last month? No   Who do you live with? Spouse   If you need help, do you have trouble finding someone available to you? No   Have you been bothered in the last four weeks by sexual problems? Yes   Do you have difficulty concentrating, remembering or making decisions? No         Does the patient have evidence of cognitive impairment? No    Asprin use counseling:Taking ASA appropriately as indicated    Age-appropriate Screening Schedule:  Refer to the list below for future screening recommendations based on patient's age, sex and/or medical conditions. Orders for these recommended tests are listed in the plan section. The patient has been provided with a written plan.    Health Maintenance   Topic Date Due   • ZOSTER VACCINE (2 of 3) 06/02/2010   • DIABETIC EYE EXAM  10/03/2019   • INFLUENZA VACCINE  08/01/2020   • URINE MICROALBUMIN  10/08/2020   • HEMOGLOBIN A1C  11/28/2020   • LIPID PANEL  05/28/2021   • COLONOSCOPY  12/02/2021   • TDAP/TD VACCINES (2 - Td) 09/27/2022          The following portions of the patient's history were reviewed and updated as appropriate: allergies, current medications, past family history, past medical history, past social history, past surgical history and problem list.    Outpatient Medications Prior to Visit   Medication Sig Dispense Refill   • Acetaminophen 500 MG coapsule Take 1,000 mg by mouth As Needed for Mild Pain .     • apixaban (ELIQUIS) 5 MG tablet  tablet Take 5 mg by mouth 2 (Two) Times a Day. Currently in a trial and it is eliquis or placebo.  Patient is not sure .     • atorvastatin (LIPITOR) 40 MG tablet TAKE 1 TABLET BY MOUTH ONCE DAILY 90 tablet 1   • cetirizine (zyrTEC) 10 MG tablet Take 10 mg by mouth Daily.     • clopidogrel (PLAVIX) 75 MG tablet TAKE 1 TABLET BY MOUTH ONCE DAILY 90 tablet 1   • Cyanocobalamin (VITAMIN B-12 PO) Take 5,000 mcg by mouth Daily.     • hydroCHLOROthiazide (HYDRODIURIL) 25 MG tablet Take 0.5 tablets by mouth Daily. (Patient taking differently: Take 25 mg by mouth Daily.) 45 tablet 1   • lisinopril (PRINIVIL,ZESTRIL) 10 MG tablet Take 1 tablet by mouth Every Morning. 90 tablet 1   • lisinopril (PRINIVIL,ZESTRIL) 20 MG tablet TAKE 1 TABLET BY MOUTH ONCE DAILY (Patient taking differently: Take 20 mg by mouth Every Evening.) 90 tablet 1   • metoprolol tartrate (LOPRESSOR) 50 MG tablet TAKE 1 TABLET BY MOUTH EVERY 12 HOURS 180 tablet 1   • Multiple Vitamins-Minerals (MULTIVITAMIN ADULT PO) Take 1 tablet by mouth Daily.     • diclofenac (VOLTAREN) 1 % gel gel Apply 4 g topically to the appropriate area as directed 4 (Four) Times a Day As Needed (pain). 100 g 2   • lisinopril-hydrochlorothiazide (PRINZIDE,ZESTORETIC) 10-12.5 MG per tablet TAKE 1 TABLET BY MOUTH ONCE DAILY 90 tablet 1   • Probiotic Product (PROBIOTIC-10 PO) Take  by mouth Daily.       No facility-administered medications prior to visit.        Patient Active Problem List   Diagnosis   • Coronary artery disease   • Peripheral neuropathy   • VF (ventricular fibrillation) (CMS/HCC)   • Hypertrophy of prostate without urinary obstruction   • Diabetic polyneuropathy associated with type 2 diabetes mellitus (CMS/HCC)   • Angina pectoris (CMS/HCC)   • Obesity   • Chronic systolic (congestive) heart failure (CMS/HCC)   • Mixed hyperlipidemia   • Encounter for screening for other disorder   • Diabetic peripheral angiopathy (CMS/HCC)   • Benign essential hypertension   •  "ASHD (arteriosclerotic heart disease)   • Seasonal allergic rhinitis   • Inflamed skin tag   • Left hip pain   • Encounter for screening   • Pain, knee   • Diabetic nephropathy associated with type 2 diabetes mellitus (CMS/[facility])   • Chronic kidney disease, stage III (moderate)       Advanced Care Planning:  ACP discussion was held with the patient during this visit. Patient does not have an advance directive, declines further assistance.    Review of Systems   Constitutional: Negative for chills, fatigue and fever.   HENT: Positive for congestion and sinus pressure. Negative for ear pain.    Respiratory: Negative for cough, chest tightness, shortness of breath and wheezing.    Cardiovascular: Negative for chest pain and palpitations.   Gastrointestinal: Negative for abdominal pain, blood in stool and constipation.   Skin: Negative for color change.   Allergic/Immunologic: Positive for environmental allergies.   Neurological: Positive for dizziness. Negative for speech difficulty and headaches.   Psychiatric/Behavioral: Negative for confusion. The patient is not nervous/anxious.        Compared to one year ago, the patient feels his physical health is worse.  Compared to one year ago, the patient feels his mental health is the same.    Reviewed chart for potential of high risk medication in the elderly: yes  Reviewed chart for potential of harmful drug interactions in the elderly:yes    Objective         Vitals:    10/12/20 1047   BP: 102/70   BP Location: Left arm   Patient Position: Sitting   Cuff Size: Large Adult   Pulse: 88   Temp: 96.6 °F (35.9 °C)   TempSrc: Temporal   Weight: 129 kg (284 lb 3.2 oz)   Height: 198.1 cm (77.99\")   PainSc: 0-No pain       Body mass index is 32.85 kg/m².  Discussed the patient's BMI with him. The BMI is above average; BMI management plan is completed.    Physical Exam          Assessment/Plan   Medicare Risks and Personalized Health Plan  CMS Preventative Services Quick " Reference  Immunizations Discussed/Encouraged (specific immunizations; Shingrix )    The above risks/problems have been discussed with the patient.  Pertinent information has been shared with the patient in the After Visit Summary.  Follow up plans and orders are seen below in the Assessment/Plan Section.    Diagnoses and all orders for this visit:    1. Medicare annual wellness visit, subsequent (Primary)    2. Mixed hyperlipidemia  -     CBC & Differential; Future  -     Comprehensive Metabolic Panel; Future  -     Lipid Panel; Future  -     TSH Rfx On Abnormal To Free T4; Future  -     Microalbumin / Creatinine Urine Ratio - Urine, Clean Catch; Future    3. Diabetic polyneuropathy associated with type 2 diabetes mellitus (CMS/[facility])  -     Vitamin B12; Future  -     Hemoglobin A1c; Future    4. Stage 3 chronic kidney disease, unspecified whether stage 3a or 3b CKD  -     Vitamin D 25 Hydroxy; Future      Follow Up:  Return for Next scheduled follow up, Labs next visit.     An After Visit Summary and PPPS were given to the patient.

## 2020-10-13 ENCOUNTER — LAB REQUISITION (OUTPATIENT)
Dept: LAB | Facility: HOSPITAL | Age: 75
End: 2020-10-13

## 2020-10-13 DIAGNOSIS — Z00.00 ROUTINE GENERAL MEDICAL EXAMINATION AT A HEALTH CARE FACILITY: ICD-10-CM

## 2020-10-13 PROCEDURE — 36415 COLL VENOUS BLD VENIPUNCTURE: CPT

## 2020-10-14 LAB
25(OH)D3+25(OH)D2 SERPL-MCNC: 42.8 NG/ML (ref 30–100)
ALBUMIN SERPL-MCNC: 5 G/DL (ref 3.5–5.2)
ALBUMIN/CREAT UR: 8 MG/G CREAT (ref 0–29)
ALBUMIN/GLOB SERPL: 2.3 G/DL
ALP SERPL-CCNC: 86 U/L (ref 39–117)
ALT SERPL-CCNC: 30 U/L (ref 1–41)
AST SERPL-CCNC: 31 U/L (ref 1–40)
BASOPHILS # BLD AUTO: 0.07 10*3/MM3 (ref 0–0.2)
BASOPHILS NFR BLD AUTO: 0.7 % (ref 0–1.5)
BILIRUB SERPL-MCNC: 0.6 MG/DL (ref 0–1.2)
BUN SERPL-MCNC: 22 MG/DL (ref 8–23)
BUN/CREAT SERPL: 16.7 (ref 7–25)
CALCIUM SERPL-MCNC: 9.5 MG/DL (ref 8.6–10.5)
CHLORIDE SERPL-SCNC: 103 MMOL/L (ref 98–107)
CHOLEST SERPL-MCNC: 173 MG/DL (ref 0–200)
CO2 SERPL-SCNC: 27.7 MMOL/L (ref 22–29)
CREAT SERPL-MCNC: 1.32 MG/DL (ref 0.76–1.27)
CREAT UR-MCNC: 192.2 MG/DL
EOSINOPHIL # BLD AUTO: 0.33 10*3/MM3 (ref 0–0.4)
EOSINOPHIL NFR BLD AUTO: 3.1 % (ref 0.3–6.2)
ERYTHROCYTE [DISTWIDTH] IN BLOOD BY AUTOMATED COUNT: 12.1 % (ref 12.3–15.4)
GLOBULIN SER CALC-MCNC: 2.2 GM/DL
GLUCOSE SERPL-MCNC: 143 MG/DL (ref 65–99)
HBA1C MFR BLD: 6.7 % (ref 4.8–5.6)
HCT VFR BLD AUTO: 47.6 % (ref 37.5–51)
HDLC SERPL-MCNC: 45 MG/DL (ref 40–60)
HGB BLD-MCNC: 16.5 G/DL (ref 13–17.7)
IMM GRANULOCYTES # BLD AUTO: 0.05 10*3/MM3 (ref 0–0.05)
IMM GRANULOCYTES NFR BLD AUTO: 0.5 % (ref 0–0.5)
LDLC SERPL CALC-MCNC: 89 MG/DL (ref 0–100)
LYMPHOCYTES # BLD AUTO: 3.46 10*3/MM3 (ref 0.7–3.1)
LYMPHOCYTES NFR BLD AUTO: 32.4 % (ref 19.6–45.3)
MCH RBC QN AUTO: 32.7 PG (ref 26.6–33)
MCHC RBC AUTO-ENTMCNC: 34.7 G/DL (ref 31.5–35.7)
MCV RBC AUTO: 94.4 FL (ref 79–97)
MICROALBUMIN UR-MCNC: 15 UG/ML
MONOCYTES # BLD AUTO: 0.93 10*3/MM3 (ref 0.1–0.9)
MONOCYTES NFR BLD AUTO: 8.7 % (ref 5–12)
NEUTROPHILS # BLD AUTO: 5.85 10*3/MM3 (ref 1.7–7)
NEUTROPHILS NFR BLD AUTO: 54.6 % (ref 42.7–76)
NRBC BLD AUTO-RTO: 0 /100 WBC (ref 0–0.2)
PLATELET # BLD AUTO: 261 10*3/MM3 (ref 140–450)
POTASSIUM SERPL-SCNC: 4.2 MMOL/L (ref 3.5–5.2)
PROT SERPL-MCNC: 7.2 G/DL (ref 6–8.5)
RBC # BLD AUTO: 5.04 10*6/MM3 (ref 4.14–5.8)
SODIUM SERPL-SCNC: 143 MMOL/L (ref 136–145)
T4 FREE SERPL-MCNC: 0.92 NG/DL (ref 0.93–1.7)
TRIGL SERPL-MCNC: 231 MG/DL (ref 0–150)
TSH SERPL DL<=0.005 MIU/L-ACNC: 4.75 UIU/ML (ref 0.27–4.2)
VIT B12 SERPL-MCNC: 782 PG/ML (ref 211–946)
VLDLC SERPL CALC-MCNC: 39 MG/DL (ref 5–40)
WBC # BLD AUTO: 10.69 10*3/MM3 (ref 3.4–10.8)

## 2020-10-21 ENCOUNTER — OFFICE VISIT (OUTPATIENT)
Dept: INTERNAL MEDICINE | Facility: CLINIC | Age: 75
End: 2020-10-21

## 2020-10-21 VITALS
SYSTOLIC BLOOD PRESSURE: 112 MMHG | BODY MASS INDEX: 33.21 KG/M2 | TEMPERATURE: 97.8 F | WEIGHT: 287 LBS | DIASTOLIC BLOOD PRESSURE: 76 MMHG | HEART RATE: 80 BPM | HEIGHT: 78 IN

## 2020-10-21 DIAGNOSIS — E66.09 CLASS 1 OBESITY DUE TO EXCESS CALORIES WITH SERIOUS COMORBIDITY AND BODY MASS INDEX (BMI) OF 33.0 TO 33.9 IN ADULT: ICD-10-CM

## 2020-10-21 DIAGNOSIS — N18.31 STAGE 3A CHRONIC KIDNEY DISEASE (HCC): ICD-10-CM

## 2020-10-21 DIAGNOSIS — E11.51 DIABETIC PERIPHERAL ANGIOPATHY (HCC): ICD-10-CM

## 2020-10-21 DIAGNOSIS — I10 BENIGN ESSENTIAL HYPERTENSION: Primary | ICD-10-CM

## 2020-10-21 DIAGNOSIS — E11.21 DIABETIC NEPHROPATHY ASSOCIATED WITH TYPE 2 DIABETES MELLITUS (HCC): ICD-10-CM

## 2020-10-21 DIAGNOSIS — E78.2 MIXED HYPERLIPIDEMIA: ICD-10-CM

## 2020-10-21 DIAGNOSIS — E11.42 DIABETIC POLYNEUROPATHY ASSOCIATED WITH TYPE 2 DIABETES MELLITUS (HCC): ICD-10-CM

## 2020-10-21 DIAGNOSIS — I20.9 ANGINA PECTORIS (HCC): ICD-10-CM

## 2020-10-21 PROCEDURE — 99214 OFFICE O/P EST MOD 30 MIN: CPT | Performed by: INTERNAL MEDICINE

## 2020-10-21 NOTE — PROGRESS NOTES
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  He comes in for follow-up of his diabetes, hypertension, angina, hyperlipidemia, obesity and chronic kidney disease.  Overall he is doing well.  He is reasonably active.  He plays golf regularly and also some walking.  He admits he struggles with diet and weight gain.  The pandemic has not helped.  However his heart disease appears to be very stable and asymptomatic and he continues to work on his diabetes.  There is no fever, cough, shortness of breath or chest pain.    Chronic Conditions:      Patient Active Problem List   Diagnosis   • Coronary artery disease   • Peripheral neuropathy   • VF (ventricular fibrillation) (CMS/HCC)   • Hypertrophy of prostate without urinary obstruction   • Diabetic polyneuropathy associated with type 2 diabetes mellitus (CMS/HCC)   • Angina pectoris (CMS/HCC)   • Obesity   • Chronic systolic (congestive) heart failure (CMS/HCC)   • Mixed hyperlipidemia   • Encounter for screening for other disorder   • Diabetic peripheral angiopathy (CMS/HCC)   • Benign essential hypertension   • ASHD (arteriosclerotic heart disease)   • Seasonal allergic rhinitis   • Inflamed skin tag   • Left hip pain   • Encounter for screening   • Pain, knee   • Diabetic nephropathy associated with type 2 diabetes mellitus (CMS/HCC)   • Chronic kidney disease, stage III (moderate)        Past Medical History:   Diagnosis Date   • Benign hypertension    • Cancer (CMS/HCC) Basal cell   • Chronic systolic (congestive) heart failure (CMS/HCC)    • Coronary artery disease    • Diabetic nephropathy associated with type 2 diabetes mellitus (CMS/HCC) 10/27/2019   • Diabetic peripheral  angiopathy (CMS/[facility])    • Diabetic polyneuropathy associated with type 2 diabetes mellitus (CMS/[facility])    • Hyperlipidemia    • Implantable cardioverter-defibrillator (ICD) generator end of life 01/01/2014    Status post ICD generator change   • Myocardial infarction (CMS/[facility]) 2013    NSTEMI   • Osteoarthritis of left hip    • Peripheral neuropathy    • Stroke (CMS/[facility])     PONTINE STROKE   • Ventricular tachycardia (CMS/[facility])        Past Surgical History:   Procedure Laterality Date   • APPENDECTOMY  1979   • CARDIAC DEFIBRILLATOR PLACEMENT  2007   • CARDIAC PACEMAKER PLACEMENT  2007    dual chamber   • CORONARY STENT PLACEMENT  2007    elective stents to LAD, CIRC   • CORONARY STENT PLACEMENT  2007    emergent L heart cath with stents RCA    • CORONARY STENT PLACEMENT      2013?  stents to diagonal and ramus   • HIP SURGERY Left 01/16/2018    total left hip arthroplasty, anterior approach   • SQUAMOUS CELL CARCINOMA EXCISION         Family History   Problem Relation Age of Onset   • Hepatitis Mother    • Dementia Father    • Stroke Sister    • Hypertension Brother    • Heart attack Paternal Grandfather 70       Social History     Socioeconomic History   • Marital status:      Spouse name: Not on file   • Number of children: Not on file   • Years of education: Not on file   • Highest education level: Not on file   Tobacco Use   • Smoking status: Never Smoker   • Smokeless tobacco: Never Used   • Tobacco comment: no passive smoke exposure   Substance and Sexual Activity   • Alcohol use: Yes     Frequency: Monthly or less     Drinks per session: 1 or 2     Binge frequency: Never     Comment: occassionally    • Drug use: No   • Sexual activity: Not Currently     Partners: Female       Allergies   Allergen Reactions   • Biaxin [Clarithromycin] Nausea Only   • Clindamycin/Lincomycin Other (See Comments)     Caused incontinence   • Levaquin [Levofloxacin] Nausea Only   • Nitroglycerin Other (See Comments)      hypotension   • Penicillins Rash         Current Outpatient Medications:   •  Acetaminophen 500 MG coapsule, Take 1,000 mg by mouth As Needed for Mild Pain ., Disp: , Rfl:   •  apixaban (ELIQUIS) 5 MG tablet tablet, Take 5 mg by mouth 2 (Two) Times a Day. Currently in a trial and it is eliquis or placebo.  Patient is not sure ., Disp: , Rfl:   •  atorvastatin (LIPITOR) 40 MG tablet, TAKE 1 TABLET BY MOUTH ONCE DAILY, Disp: 90 tablet, Rfl: 1  •  cetirizine (zyrTEC) 10 MG tablet, Take 10 mg by mouth Daily., Disp: , Rfl:   •  clopidogrel (PLAVIX) 75 MG tablet, TAKE 1 TABLET BY MOUTH ONCE DAILY, Disp: 90 tablet, Rfl: 1  •  Cyanocobalamin (VITAMIN B-12 PO), Take 5,000 mcg by mouth Daily., Disp: , Rfl:   •  hydroCHLOROthiazide (HYDRODIURIL) 25 MG tablet, Take 0.5 tablets by mouth Daily. (Patient taking differently: Take 25 mg by mouth Daily.), Disp: 45 tablet, Rfl: 1  •  lisinopril (PRINIVIL,ZESTRIL) 10 MG tablet, Take 1 tablet by mouth Every Morning., Disp: 90 tablet, Rfl: 1  •  lisinopril (PRINIVIL,ZESTRIL) 20 MG tablet, TAKE 1 TABLET BY MOUTH ONCE DAILY (Patient taking differently: Take 20 mg by mouth Every Evening.), Disp: 90 tablet, Rfl: 1  •  metoprolol tartrate (LOPRESSOR) 50 MG tablet, TAKE 1 TABLET BY MOUTH EVERY 12 HOURS, Disp: 180 tablet, Rfl: 1  •  Multiple Vitamins-Minerals (MULTIVITAMIN ADULT PO), Take 1 tablet by mouth Daily., Disp: , Rfl:     Review of Systems   Constitutional: Negative for chills, fatigue and fever.   HENT: Positive for congestion. Negative for ear pain and sinus pressure.    Respiratory: Negative for cough, chest tightness, shortness of breath and wheezing.    Cardiovascular: Negative for chest pain and palpitations.   Gastrointestinal: Negative for abdominal pain, blood in stool and constipation.   Skin: Negative for color change.   Allergic/Immunologic: Negative for environmental allergies.   Neurological: Positive for dizziness. Negative for speech difficulty and headache.  "  Psychiatric/Behavioral: Negative for decreased concentration. The patient is not nervous/anxious.         Vital Signs  Vitals:    10/21/20 1538   BP: 112/76   BP Location: Left arm   Patient Position: Sitting   Cuff Size: Adult   Pulse: 80   Temp: 97.8 °F (36.6 °C)   Weight: 130 kg (287 lb)   Height: 198.1 cm (77.99\")   PainSc: 0-No pain       Physical Exam  Vitals signs and nursing note reviewed.   Constitutional:       Appearance: He is well-developed. He is obese.   HENT:      Head: Normocephalic and atraumatic.      Right Ear: External ear normal.      Left Ear: External ear normal.      Nose: Nose normal.      Mouth/Throat:      Pharynx: No oropharyngeal exudate.   Eyes:      Conjunctiva/sclera: Conjunctivae normal.      Pupils: Pupils are equal, round, and reactive to light.   Neck:      Musculoskeletal: Normal range of motion and neck supple.      Thyroid: No thyromegaly.      Vascular: No JVD.   Cardiovascular:      Rate and Rhythm: Normal rate and regular rhythm.      Pulses:           Dorsalis pedis pulses are 1+ on the right side and 1+ on the left side.      Heart sounds: Normal heart sounds. No murmur. No friction rub. No gallop.    Pulmonary:      Effort: Pulmonary effort is normal. No respiratory distress.      Breath sounds: Normal breath sounds. No wheezing or rales.   Chest:      Chest wall: No tenderness.   Abdominal:      General: Bowel sounds are normal. There is no distension.      Palpations: Abdomen is soft. There is no mass.      Tenderness: There is no abdominal tenderness. There is no guarding or rebound.      Hernia: No hernia is present.   Musculoskeletal: Normal range of motion.         General: No tenderness.      Right foot: No deformity.      Left foot: No deformity.   Feet:      Right foot:      Protective Sensation: 5 sites tested. 5 sites sensed.      Skin integrity: Skin integrity normal.      Left foot:      Protective Sensation: 5 sites tested. 5 sites sensed.      Skin " integrity: Skin integrity normal.      Comments: Sensation in both feet is slightly reduced to monofilament.     Diabetic Foot Exam Performed and Monofilament Test Performed    Lymphadenopathy:      Cervical: No cervical adenopathy.   Skin:     General: Skin is warm and dry.      Findings: No erythema or rash.   Neurological:      Mental Status: He is alert and oriented to person, place, and time.      Cranial Nerves: No cranial nerve deficit.      Sensory: No sensory deficit.      Motor: No abnormal muscle tone.      Coordination: Coordination normal.      Deep Tendon Reflexes: Reflexes normal.   Psychiatric:         Behavior: Behavior normal.         Thought Content: Thought content normal.         Judgment: Judgment normal.          Procedures    ACE III MINI             Assessment/Plan:    Diagnoses and all orders for this visit:    1. Benign essential hypertension (Primary)    2. Diabetic polyneuropathy associated with type 2 diabetes mellitus (CMS/HCC)    3. Angina pectoris (CMS/HCC)    4. Diabetic peripheral angiopathy (CMS/HCC)    5. Mixed hyperlipidemia    6. Class 1 obesity due to excess calories with serious comorbidity and body mass index (BMI) of 33.0 to 33.9 in adult    7. Stage 3a chronic kidney disease    8. Diabetic nephropathy associated with type 2 diabetes mellitus (CMS/HCC)    Plan    Overall he remains in good health with good lifestyle habits.  He is up-to-date on immunizations including influenza.  Once again I encouraged him to reduce calories in the form of simple carbohydrates and saturated fat to lose weight.    A1c is well controlled at 6.7.  Foot examination was performed today.  He is up-to-date on eye examination.  He continues to control this with healthy diet.    Blood pressure is well controlled on hydrochlorothiazide, lisinopril and metoprolol.    Coronary artery disease is controlled with atorvastatin, clopidogrel, lisinopril and metoprolol.  He continues Eliquis for atrial  fibrillation.  He will continue follow-up with cardiology.    Lipids are well controlled although triglycerides remain high.  He will continue atorvastatin and efforts at diet and weight loss.    BMI is 33, see dietary instructions above.    GFR is stable at 53, the treatment remains to continue to control blood sugar and blood pressure, and to avoid NSAIDs.          Plan of care reviewed with patient at the conclusion of today's visit. Education was provided regarding diagnosis, management, and any prescribed or recommended OTC medications.Patient verbalizes understanding of and agreement with management plan.         Xavier Briones MD           Answers for HPI/ROS submitted by the patient on 10/19/2020   What is the primary reason for your visit?: Physical

## 2020-11-04 ENCOUNTER — OFFICE VISIT (OUTPATIENT)
Dept: CARDIOLOGY | Facility: CLINIC | Age: 75
End: 2020-11-04

## 2020-11-04 ENCOUNTER — TELEPHONE (OUTPATIENT)
Dept: CARDIOLOGY | Facility: CLINIC | Age: 75
End: 2020-11-04

## 2020-11-04 VITALS
HEIGHT: 78 IN | BODY MASS INDEX: 33.55 KG/M2 | SYSTOLIC BLOOD PRESSURE: 128 MMHG | WEIGHT: 290 LBS | HEART RATE: 72 BPM | TEMPERATURE: 96.2 F | OXYGEN SATURATION: 99 % | DIASTOLIC BLOOD PRESSURE: 62 MMHG

## 2020-11-04 DIAGNOSIS — I25.10 CORONARY ARTERY DISEASE INVOLVING NATIVE CORONARY ARTERY OF NATIVE HEART WITHOUT ANGINA PECTORIS: ICD-10-CM

## 2020-11-04 DIAGNOSIS — E78.2 MIXED HYPERLIPIDEMIA: ICD-10-CM

## 2020-11-04 DIAGNOSIS — I50.22 CHRONIC SYSTOLIC (CONGESTIVE) HEART FAILURE (HCC): Primary | ICD-10-CM

## 2020-11-04 PROCEDURE — 99214 OFFICE O/P EST MOD 30 MIN: CPT | Performed by: INTERNAL MEDICINE

## 2020-11-04 PROCEDURE — 93283 PRGRMG EVAL IMPLANTABLE DFB: CPT | Performed by: INTERNAL MEDICINE

## 2020-11-04 RX ORDER — ASPIRIN 81 MG/1
81 TABLET ORAL DAILY
COMMUNITY

## 2020-11-04 NOTE — PROGRESS NOTES
[facility] at [facility]  Office visit  Abbe Palacios  1945  [phone]    VISIT DATE:  11/4/2020      PCP: Xavier Briones MD  [address] [address]    CC:  Chief Complaint   Patient presents with   • Coronary Artery Disease       PROBLEM LIST:  1.  Coronary artery disease:  a.  Status post stenting of right coronary artery, bare-metal stent for acute myocardial infarction, 11/07/2007.  b. Status post elective stenting of proximal left anterior descending coronary artery, 11/14/2007.  c. Elina-infarction VF and post-infarction  ventricular ectopy with positive EP study culminating in dual-chamber ICD implantation, November 2007.  d. Recurrent chest pain with non-STEMI, status post cardiac catheterization, 01/24/2013, Dr. Jarrett:  PTCA and stenting of the first diagonal using  a 2.5 x 24 mm Promus drug-eluting stent, PTCA and stenting of the ramus intermedius using a 2.25 x 20 mm Promus drug-eluting stent.  2. Benign hypertension.  3. Hyperlipidemia.  4. Peripheral neuropathy.  5. Status post ICD generator change,  January 2014.     ASSESSMENT:   Diagnosis Plan   1. Chronic systolic (congestive) heart failure (CMS/HCC)     2. Coronary artery disease involving native coronary artery of native heart without angina pectoris     3. Mixed hyperlipidemia       Device interrogation:  Medtronic dual-chamber ICD  96 %Atrial pacing, sensed P-wave 1.6 mV, capture threshold 0.5 V at 0.4 ms, impedance 456 ohms  Less than 1 % RV pacing, sensed R-wave 5.5 mV, capture threshold 1.1 mV at 0.4 ms, impedance 418 ohms  No arrhythmia  Estimated battery life 2 to 4 months.  Not dependent      PLAN:  Coronary artery disease: Stable and asymptomatic. continue Plavix.  Continue high intensity statin therapy and current afterload reduction    Elina-infarct V. fib status post ICD implantation: Continue beta-blockade.    Hyperlipidemia: Currently well controlled.  Continue current medical  "therapy, Goal LDL <100, ideally less than 70.  Mild hypertriglyceridemia, continue dietary and lifestyle modifications.  Unclear clinical benefit of over-the-counter fish oil supplementation, instructed to discontinue.    Hypertension: Goal less than 130/80, controlled with intermittent orthostasis.  Discontinuing a.m. lisinopril dose, otherwise continue current medical therapy.  Hydrochlorothiazide 12.5 mg p.o. every morning, metoprolol tartrate 50 mg p.o. twice daily, lisinopril 20 mg p.o. every evening.    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    Paroxysmal atrial fibrillation: Chads vas score equal to 7.  Currently enrolled in [employer].      Subjective  Report stable functional capacity.  Denies chest pain, palpitations or dyspnea on exertion.  Blood pressures run less than 130/80 mmHg.   Reporting intermittent orthostasis.  Denies myalgias on statin therapy.  He is compliant with medical therapy.  Device interrogation was negative for arrhythmia.      PHYSICAL EXAMINATION:  Vitals:    11/04/20 1028   BP: 128/62   BP Location: Right arm   Patient Position: Sitting   Pulse: 72   Temp: 96.2 °F (35.7 °C)   SpO2: 99%   Weight: 132 kg (290 lb)   Height: 198.1 cm (78\")     General Appearance:    Alert, cooperative, no distress, appears stated age   Head:    Normocephalic, without obvious abnormality, atraumatic   Eyes:    conjunctiva/corneas clear   Nose:   Nares normal, septum midline, mucosa normal, no drainage   Throat:   Lips, teeth and gums normal   Neck:   Supple, symmetrical, trachea midline, no carotid    bruit or JVD   Lungs:     Clear to auscultation bilaterally, respirations unlabored   Chest Wall:    No tenderness or deformity    Heart:    Regular rate and rhythm, S1 and S2 normal, no murmur, rub   or gallop, normal carotid impulse bilaterally without bruit.   Abdomen:     Soft, non-tender   Extremities:   Extremities normal, atraumatic, no cyanosis or edema "   Pulses:   2+ and symmetric all extremities   Skin:   Skin color, texture, turgor normal, no rashes or lesions       Diagnostic Data:  Procedures  Lab Results   Component Value Date    CHLPL 173 10/13/2020    TRIG 231 (H) 10/13/2020    HDL 45 10/13/2020     Lab Results   Component Value Date    GLUCOSE 98 01/06/2014    BUN 22 10/13/2020    CREATININE 1.32 (H) 10/13/2020     10/13/2020    K 4.2 10/13/2020     10/13/2020    CO2 27.7 10/13/2020     Lab Results   Component Value Date    HGBA1C 6.70 (H) 10/13/2020     Lab Results   Component Value Date    WBC 10.69 10/13/2020    HGB 16.5 10/13/2020    HCT 47.6 10/13/2020     10/13/2020       Allergies  Allergies   Allergen Reactions   • Biaxin [Clarithromycin] Nausea Only   • Clindamycin/Lincomycin Other (See Comments)     Caused incontinence   • Levaquin [Levofloxacin] Nausea Only   • Nitroglycerin Other (See Comments)     hypotension   • Penicillins Rash       Current Medications    Current Outpatient Medications:   •  Acetaminophen 500 MG coapsule, Take 1,000 mg by mouth As Needed for Mild Pain ., Disp: , Rfl:   •  apixaban (ELIQUIS) 5 MG tablet tablet, Take 5 mg by mouth 2 (Two) Times a Day. Currently in a trial and it is eliquis or placebo.  Patient is not sure ., Disp: , Rfl:   •  aspirin 81 MG EC tablet, Take 81 mg by mouth Daily., Disp: , Rfl:   •  atorvastatin (LIPITOR) 40 MG tablet, TAKE 1 TABLET BY MOUTH ONCE DAILY, Disp: 90 tablet, Rfl: 1  •  cetirizine (zyrTEC) 10 MG tablet, Take 10 mg by mouth Daily., Disp: , Rfl:   •  clopidogrel (PLAVIX) 75 MG tablet, TAKE 1 TABLET BY MOUTH ONCE DAILY, Disp: 90 tablet, Rfl: 1  •  Cyanocobalamin (VITAMIN B-12 PO), Take 5,000 mcg by mouth Daily., Disp: , Rfl:   •  hydroCHLOROthiazide (HYDRODIURIL) 25 MG tablet, Take 0.5 tablets by mouth Daily. (Patient taking differently: Take 25 mg by mouth Daily.), Disp: 45 tablet, Rfl: 1  •  lisinopril (PRINIVIL,ZESTRIL) 10 MG tablet, Take 1 tablet by mouth Every  Morning., Disp: 90 tablet, Rfl: 1  •  metoprolol tartrate (LOPRESSOR) 50 MG tablet, TAKE 1 TABLET BY MOUTH EVERY 12 HOURS, Disp: 180 tablet, Rfl: 1  •  Multiple Vitamins-Minerals (MULTIVITAMIN ADULT PO), Take 1 tablet by mouth Daily., Disp: , Rfl:           ROS  Review of Systems   Cardiovascular: Negative for chest pain, dyspnea on exertion, irregular heartbeat and palpitations.   Respiratory: Negative for shortness of breath and sputum production.          SOCIAL HX  Social History     Socioeconomic History   • Marital status:      Spouse name: Not on file   • Number of children: Not on file   • Years of education: Not on file   • Highest education level: Not on file   Tobacco Use   • Smoking status: Never Smoker   • Smokeless tobacco: Never Used   • Tobacco comment: no passive smoke exposure   Substance and Sexual Activity   • Alcohol use: Yes     Frequency: Monthly or less     Drinks per session: 1 or 2     Binge frequency: Never     Comment: occassionally    • Drug use: No   • Sexual activity: Not Currently     Partners: Female       FAMILY HX  Family History   Problem Relation Age of Onset   • Hepatitis Mother    • Dementia Father    • Stroke Sister    • Hypertension Brother    • Heart attack Paternal Grandfather 70             Abbe Mendieta III, MD, [facility]

## 2020-11-04 NOTE — TELEPHONE ENCOUNTER
Scheduled to have a dental implant.  wants to know if he can hold Eliquis 2 days prior and Plavix 5 days prior to procedure. Please advise.

## 2020-12-02 RX ORDER — ATORVASTATIN CALCIUM 40 MG/1
40 TABLET, FILM COATED ORAL DAILY
Qty: 90 TABLET | Refills: 1 | Status: SHIPPED | OUTPATIENT
Start: 2020-12-02 | End: 2021-05-28 | Stop reason: SDUPTHER

## 2020-12-28 RX ORDER — CLOPIDOGREL BISULFATE 75 MG/1
75 TABLET ORAL DAILY
Qty: 90 TABLET | Refills: 3 | Status: SHIPPED | OUTPATIENT
Start: 2020-12-28 | End: 2021-12-27 | Stop reason: SDUPTHER

## 2020-12-30 RX ORDER — METOPROLOL TARTRATE 50 MG/1
50 TABLET, FILM COATED ORAL EVERY 12 HOURS
Qty: 180 TABLET | Refills: 1 | Status: SHIPPED | OUTPATIENT
Start: 2020-12-30 | End: 2021-06-29 | Stop reason: SDUPTHER

## 2021-03-08 RX ORDER — LISINOPRIL 10 MG/1
10 TABLET ORAL EVERY MORNING
Qty: 90 TABLET | Refills: 0 | Status: SHIPPED | OUTPATIENT
Start: 2021-03-08 | End: 2021-04-19 | Stop reason: SDUPTHER

## 2021-03-29 RX ORDER — HYDROCHLOROTHIAZIDE 25 MG/1
12.5 TABLET ORAL DAILY
Qty: 45 TABLET | Refills: 1 | Status: SHIPPED | OUTPATIENT
Start: 2021-03-29 | End: 2021-09-27

## 2021-04-19 RX ORDER — LISINOPRIL 10 MG/1
10 TABLET ORAL EVERY MORNING
Qty: 90 TABLET | Refills: 0 | Status: SHIPPED | OUTPATIENT
Start: 2021-04-19 | End: 2021-06-10 | Stop reason: SDUPTHER

## 2021-04-21 ENCOUNTER — OFFICE VISIT (OUTPATIENT)
Dept: INTERNAL MEDICINE | Facility: CLINIC | Age: 76
End: 2021-04-21

## 2021-04-21 VITALS
BODY MASS INDEX: 32.77 KG/M2 | TEMPERATURE: 96.9 F | HEART RATE: 84 BPM | DIASTOLIC BLOOD PRESSURE: 88 MMHG | SYSTOLIC BLOOD PRESSURE: 126 MMHG | WEIGHT: 283.2 LBS | HEIGHT: 78 IN

## 2021-04-21 DIAGNOSIS — I10 BENIGN ESSENTIAL HYPERTENSION: ICD-10-CM

## 2021-04-21 DIAGNOSIS — E78.2 MIXED HYPERLIPIDEMIA: ICD-10-CM

## 2021-04-21 DIAGNOSIS — E11.21 DIABETIC NEPHROPATHY ASSOCIATED WITH TYPE 2 DIABETES MELLITUS (HCC): Primary | ICD-10-CM

## 2021-04-21 DIAGNOSIS — E03.9 ACQUIRED HYPOTHYROIDISM: ICD-10-CM

## 2021-04-21 PROCEDURE — 99214 OFFICE O/P EST MOD 30 MIN: CPT | Performed by: INTERNAL MEDICINE

## 2021-04-21 RX ORDER — BIMATOPROST 0.01 %
1 DROPS OPHTHALMIC (EYE) NIGHTLY
COMMUNITY
Start: 2021-03-26

## 2021-04-21 RX ORDER — TOBRAMYCIN AND DEXAMETHASONE 3; 1 MG/ML; MG/ML
1 SUSPENSION/ DROPS OPHTHALMIC
COMMUNITY
End: 2021-10-04

## 2021-04-21 NOTE — PROGRESS NOTES
[facility]     Abbe Palacios  1945   [id number]      Patient Care Team:  Xavier Briones MD as PCP - General  Xavier Briones MD as PCP - Family Medicine    Chief Complaint::   Chief Complaint   Patient presents with   • Hyperlipidemia   • Hypertension   • Coronary Artery Disease   • Diabetes        HPI  Judge Palacios comes in for follow-up of his diabetes, hypertension, hyperlipidemia and hypothyroidism.  He continues to see Dr. Mendieta for his coronary artery disease and atrial fibrillation.  He feels well although earlier this week he was treated by Dr. Mathur for a abscess in the left upper eyelid.  It was drained in Dr. Mathur's office and is now improving.  He is playing golf regularly.  His strength and stamina are good.  His main worry is his wife's memory.  He does have some postnasal drainage for which she takes Zyrtec.  There is no fever, cough, shortness of breath or chest pain.    Chronic Conditions:      Patient Active Problem List   Diagnosis   • Coronary artery disease   • Peripheral neuropathy   • VF (ventricular fibrillation) (CMS/HCC)   • Hypertrophy of prostate without urinary obstruction   • Diabetic polyneuropathy associated with type 2 diabetes mellitus (CMS/HCC)   • Angina pectoris (CMS/HCC)   • Obesity   • Chronic systolic (congestive) heart failure (CMS/HCC)   • Mixed hyperlipidemia   • Encounter for screening for other disorder   • Diabetic peripheral angiopathy (CMS/HCC)   • Benign essential hypertension   • ASHD (arteriosclerotic heart disease)   • Seasonal allergic rhinitis   • Inflamed skin tag   • Left hip pain   • Encounter for screening   • Pain, knee   • Diabetic nephropathy associated with type 2 diabetes mellitus (CMS/HCC)   • Chronic kidney disease, stage III (moderate) (CMS/HCC)        Past Medical History:   Diagnosis Date   • Benign hypertension    • Cancer (CMS/HCC) Basal cell   • Chronic systolic (congestive) heart failure (CMS/HCC)    •  Coronary artery disease    • Diabetic nephropathy associated with type 2 diabetes mellitus (CMS/[facility]) 10/27/2019   • Diabetic peripheral angiopathy (CMS/[facility])    • Diabetic polyneuropathy associated with type 2 diabetes mellitus (CMS/[facility])    • Hyperlipidemia    • Implantable cardioverter-defibrillator (ICD) generator end of life 01/01/2014    Status post ICD generator change   • Myocardial infarction (CMS/[facility]) 2013    NSTEMI   • Osteoarthritis of left hip    • Peripheral neuropathy    • Stroke (CMS/[facility])     PONTINE STROKE   • Ventricular tachycardia (CMS/[facility])        Past Surgical History:   Procedure Laterality Date   • APPENDECTOMY  1979   • CARDIAC DEFIBRILLATOR PLACEMENT  2007   • CARDIAC PACEMAKER PLACEMENT  2007    dual chamber   • CORONARY STENT PLACEMENT  2007    elective stents to LAD, CIRC   • CORONARY STENT PLACEMENT  2007    emergent L heart cath with stents RCA    • CORONARY STENT PLACEMENT      2013?  stents to diagonal and ramus   • HIP SURGERY Left 01/16/2018    total left hip arthroplasty, anterior approach   • SQUAMOUS CELL CARCINOMA EXCISION         Family History   Problem Relation Age of Onset   • Hepatitis Mother    • Dementia Father    • Stroke Sister    • Hypertension Brother    • Heart attack Paternal Grandfather 70       Social History     Socioeconomic History   • Marital status:      Spouse name: Not on file   • Number of children: Not on file   • Years of education: Not on file   • Highest education level: Not on file   Tobacco Use   • Smoking status: Never Smoker   • Smokeless tobacco: Never Used   • Tobacco comment: no passive smoke exposure   Substance and Sexual Activity   • Alcohol use: Yes     Comment: occassionally    • Drug use: No   • Sexual activity: Not Currently     Partners: Female       Allergies   Allergen Reactions   • Biaxin [Clarithromycin] Nausea Only   • Clindamycin/Lincomycin Other (See Comments)     Caused incontinence   • Levaquin [Levofloxacin] Nausea Only   •  Nitroglycerin Other (See Comments)     hypotension   • Penicillins Rash         Current Outpatient Medications:   •  Acetaminophen 500 MG coapsule, Take 1,000 mg by mouth As Needed for Mild Pain ., Disp: , Rfl:   •  apixaban (ELIQUIS) 5 MG tablet tablet, Take 5 mg by mouth 2 (Two) Times a Day. Currently in a trial and it is eliquis or placebo.  Patient is not sure ., Disp: , Rfl:   •  aspirin 81 MG EC tablet, Take 81 mg by mouth Daily. Pt in trial - may be a placebo, Disp: , Rfl:   •  atorvastatin (LIPITOR) 40 MG tablet, Take 1 tablet by mouth Daily., Disp: 90 tablet, Rfl: 1  •  cetirizine (zyrTEC) 10 MG tablet, Take 10 mg by mouth Daily., Disp: , Rfl:   •  clopidogrel (PLAVIX) 75 MG tablet, Take 1 tablet by mouth Daily., Disp: 90 tablet, Rfl: 3  •  Cyanocobalamin (VITAMIN B-12 PO), Take 5,000 mcg by mouth Daily., Disp: , Rfl:   •  hydroCHLOROthiazide (HYDRODIURIL) 25 MG tablet, Take 0.5 tablets by mouth Daily., Disp: 45 tablet, Rfl: 1  •  lisinopril (PRINIVIL,ZESTRIL) 10 MG tablet, Take 1 tablet by mouth Every Morning., Disp: 90 tablet, Rfl: 0  •  Lumigan 0.01 % ophthalmic drops, Apply 1 drop to eye(s) as directed by provider Daily., Disp: , Rfl:   •  metoprolol tartrate (LOPRESSOR) 50 MG tablet, Take 1 tablet by mouth Every 12 (Twelve) Hours., Disp: 180 tablet, Rfl: 1  •  Multiple Vitamins-Minerals (MULTIVITAMIN ADULT PO), Take 1 tablet by mouth Daily., Disp: , Rfl:   •  tobramycin-dexamethasone (TOBRADEX) 0.3-0.1 % ophthalmic suspension, Administer 1 drop into the left eye Every 4 (Four) Hours While Awake., Disp: , Rfl:     Review of Systems   Constitutional: Negative for chills, fatigue and fever.   HENT: Negative for congestion, ear pain and sinus pressure.    Respiratory: Negative for cough, chest tightness, shortness of breath and wheezing.    Cardiovascular: Negative for chest pain and palpitations.   Gastrointestinal: Negative for abdominal pain, blood in stool and constipation.   Skin: Negative for color  "change.   Allergic/Immunologic: Positive for environmental allergies.   Neurological: Negative for dizziness, speech difficulty and headache.   Psychiatric/Behavioral: Negative for decreased concentration. The patient is not nervous/anxious.         Vital Signs  Vitals:    04/21/21 0831   BP: 126/88   BP Location: Left arm   Patient Position: Sitting   Cuff Size: Large Adult   Pulse: 84   Temp: 96.9 °F (36.1 °C)   Weight: 128 kg (283 lb 3.2 oz)   Height: 198.1 cm (77.99\")   PainSc: 0-No pain       Physical Exam  Vitals reviewed.   Constitutional:       Appearance: He is well-developed.   HENT:      Head: Normocephalic and atraumatic.   Cardiovascular:      Rate and Rhythm: Normal rate and regular rhythm.      Heart sounds: Normal heart sounds. No murmur heard.     Pulmonary:      Effort: Pulmonary effort is normal.      Breath sounds: Normal breath sounds.   Neurological:      Mental Status: He is alert and oriented to person, place, and time.          Procedures    ACE III MINI             Assessment/Plan:    Diagnoses and all orders for this visit:    1. Diabetic nephropathy associated with type 2 diabetes mellitus (CMS/[facility]) (Primary)  -     Hemoglobin A1c; Future  -     Hemoglobin A1c    2. Benign essential hypertension    3. Mixed hyperlipidemia  -     Comprehensive Metabolic Panel; Future  -     Lipid Panel; Future  -     Lipid Panel  -     Comprehensive Metabolic Panel    4. Acquired hypothyroidism  -     TSH; Future  -     TSH    Plan    A1c is pending, continue working on weight loss and healthy diet.    Blood pressure is well controlled on lisinopril, metoprolol and hydrochlorothiazide.    Lipid panel is pending, continue atorvastatin and healthy diet.    He is clinically euthyroid, TSH is pending.        Plan of care reviewed with patient at the conclusion of today's visit. Education was provided regarding diagnosis, management, and any prescribed or recommended OTC medications.Patient verbalizes " understanding of and agreement with management plan.         Xavier Briones MD

## 2021-04-22 LAB
ALBUMIN SERPL-MCNC: 4.8 G/DL (ref 3.5–5.2)
ALBUMIN/GLOB SERPL: 1.8 G/DL
ALP SERPL-CCNC: 81 U/L (ref 39–117)
ALT SERPL-CCNC: 25 U/L (ref 1–41)
AST SERPL-CCNC: 25 U/L (ref 1–40)
BILIRUB SERPL-MCNC: 0.6 MG/DL (ref 0–1.2)
BUN SERPL-MCNC: 18 MG/DL (ref 8–23)
BUN/CREAT SERPL: 14.1 (ref 7–25)
CALCIUM SERPL-MCNC: 10.1 MG/DL (ref 8.6–10.5)
CHLORIDE SERPL-SCNC: 100 MMOL/L (ref 98–107)
CHOLEST SERPL-MCNC: 179 MG/DL (ref 0–200)
CO2 SERPL-SCNC: 31.7 MMOL/L (ref 22–29)
CREAT SERPL-MCNC: 1.28 MG/DL (ref 0.76–1.27)
GLOBULIN SER CALC-MCNC: 2.6 GM/DL
GLUCOSE SERPL-MCNC: 131 MG/DL (ref 65–99)
HBA1C MFR BLD: 6.69 % (ref 4.8–5.6)
HDLC SERPL-MCNC: 45 MG/DL (ref 40–60)
LDLC SERPL CALC-MCNC: 92 MG/DL (ref 0–100)
POTASSIUM SERPL-SCNC: 4.5 MMOL/L (ref 3.5–5.2)
PROT SERPL-MCNC: 7.4 G/DL (ref 6–8.5)
SODIUM SERPL-SCNC: 139 MMOL/L (ref 136–145)
TRIGL SERPL-MCNC: 252 MG/DL (ref 0–150)
TSH SERPL DL<=0.005 MIU/L-ACNC: 4.71 UIU/ML (ref 0.27–4.2)
VLDLC SERPL CALC-MCNC: 42 MG/DL (ref 5–40)

## 2021-05-10 ENCOUNTER — OFFICE VISIT (OUTPATIENT)
Dept: CARDIOLOGY | Facility: CLINIC | Age: 76
End: 2021-05-10

## 2021-05-10 VITALS
DIASTOLIC BLOOD PRESSURE: 68 MMHG | BODY MASS INDEX: 32.51 KG/M2 | HEART RATE: 83 BPM | HEIGHT: 78 IN | WEIGHT: 281 LBS | SYSTOLIC BLOOD PRESSURE: 110 MMHG

## 2021-05-10 DIAGNOSIS — I25.10 CORONARY ARTERY DISEASE INVOLVING NATIVE CORONARY ARTERY OF NATIVE HEART WITHOUT ANGINA PECTORIS: ICD-10-CM

## 2021-05-10 DIAGNOSIS — E78.2 MIXED HYPERLIPIDEMIA: ICD-10-CM

## 2021-05-10 DIAGNOSIS — I50.22 CHRONIC SYSTOLIC (CONGESTIVE) HEART FAILURE (HCC): Primary | ICD-10-CM

## 2021-05-10 PROCEDURE — 93283 PRGRMG EVAL IMPLANTABLE DFB: CPT | Performed by: INTERNAL MEDICINE

## 2021-05-10 PROCEDURE — 99214 OFFICE O/P EST MOD 30 MIN: CPT | Performed by: INTERNAL MEDICINE

## 2021-05-10 RX ORDER — DOXYCYCLINE HYCLATE 100 MG/1
100 CAPSULE ORAL DAILY
COMMUNITY
Start: 2021-04-23 | End: 2021-05-31

## 2021-05-10 NOTE — PROGRESS NOTES
[facility] at [facility]  Office visit  Abbe Palacios  1945  [phone]    VISIT DATE:  5/10/2021      PCP: Xavier Brinoes MD  [address] [address]    CC:  Chief Complaint   Patient presents with   • Chronic systolic (congestive) heart failure (CMS/HCC)   • Coronary Artery Disease       PROBLEM LIST:  1.  Coronary artery disease:  a.  Status post stenting of right coronary artery, bare-metal stent for acute myocardial infarction, 11/07/2007.  b. Status post elective stenting of proximal left anterior descending coronary artery, 11/14/2007.  c. Elina-infarction VF and post-infarction  ventricular ectopy with positive EP study culminating in dual-chamber ICD implantation, November 2007.  d. Recurrent chest pain with non-STEMI, status post cardiac catheterization, 01/24/2013, Dr. Jarrett:  PTCA and stenting of the first diagonal using  a 2.5 x 24 mm Promus drug-eluting stent, PTCA and stenting of the ramus intermedius using a 2.25 x 20 mm Promus drug-eluting stent.  2. Benign hypertension.  3. Hyperlipidemia.  4. Peripheral neuropathy.  5. Status post ICD generator change,  January 2014.     ASSESSMENT:   Diagnosis Plan   1. Chronic systolic (congestive) heart failure (CMS/HCC)     2. Coronary artery disease involving native coronary artery of native heart without angina pectoris     3. Mixed hyperlipidemia       Device interrogation:  Medtronic dual-chamber ICD  98 %Atrial pacing, sensed P-wave 2.9 mV, capture threshold 0.6 V at 0.4 ms, impedance 456 ohms  Less than 1 % RV pacing, sensed R-wave 7 mV, capture threshold 1.1 mV at 0.4 ms, impedance 399 ohms  No arrhythmia  Nearing MIKEL.  Not dependent      PLAN:  Coronary artery disease: Stable and asymptomatic. continue Plavix.  Continue high intensity statin therapy and current afterload reduction    Elina-infarct V. fib status post ICD implantation: Continue beta-blockade.    Hyperlipidemia: Currently well  "controlled.  Continue current medical therapy, Goal LDL <100, ideally less than 70.  Mild hypertriglyceridemia, continue dietary and lifestyle modifications.  LDL is gradually trending upward, monitoring closely, may need to switch over to rosuvastatin or add Zetia.    Hypertension: Goal less than 130/80, controlled with intermittent orthostasis. continue current medical therapy.  Hydrochlorothiazide 12.5 mg p.o. every morning, metoprolol tartrate 50 mg p.o. twice daily, lisinopril 20 mg p.o. every evening.  Will likely need to wean back on medical therapy further if orthostasis worsens, he will keep a home blood pressure log.    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    Paroxysmal atrial fibrillation: Chads vas score equal to 7.  Currently enrolled in [employer].      Subjective  Report stable functional capacity.  Denies chest pain, palpitations or dyspnea on exertion.  Blood pressures run less than 130/80 mmHg.   Reporting intermittent orthostasis, in particular when it is warmer.  Denies myalgias on statin therapy.  He is compliant with medical therapy.  Device interrogation was negative for arrhythmia.      PHYSICAL EXAMINATION:  Vitals:    05/10/21 1021   BP: 110/68   BP Location: Left arm   Patient Position: Sitting   Pulse: 83   Weight: 127 kg (281 lb)   Height: 198.1 cm (78\")     General Appearance:    Alert, cooperative, no distress, appears stated age   Head:    Normocephalic, without obvious abnormality, atraumatic   Eyes:    conjunctiva/corneas clear   Nose:   Nares normal, septum midline, mucosa normal, no drainage   Throat:   Lips, teeth and gums normal   Neck:   Supple, symmetrical, trachea midline, no carotid    bruit or JVD   Lungs:     Clear to auscultation bilaterally, respirations unlabored   Chest Wall:    No tenderness or deformity    Heart:    Regular rate and rhythm, S1 and S2 normal, no murmur, rub   or gallop, normal carotid impulse bilaterally without " bruit.   Abdomen:     Soft, non-tender   Extremities:   Extremities normal, atraumatic, no cyanosis or edema   Pulses:   2+ and symmetric all extremities   Skin:   Skin color, texture, turgor normal, no rashes or lesions       Diagnostic Data:  Procedures  Lab Results   Component Value Date    CHLPL 179 04/21/2021    TRIG 252 (H) 04/21/2021    HDL 45 04/21/2021     Lab Results   Component Value Date    GLUCOSE 98 01/06/2014    BUN 18 04/21/2021    CREATININE 1.28 (H) 04/21/2021     04/21/2021    K 4.5 04/21/2021     04/21/2021    CO2 31.7 (H) 04/21/2021     Lab Results   Component Value Date    HGBA1C 6.69 (H) 04/21/2021     Lab Results   Component Value Date    WBC 10.69 10/13/2020    HGB 16.5 10/13/2020    HCT 47.6 10/13/2020     10/13/2020       Allergies  Allergies   Allergen Reactions   • Biaxin [Clarithromycin] Nausea Only   • Clindamycin/Lincomycin Other (See Comments)     Caused incontinence   • Levaquin [Levofloxacin] Nausea Only   • Nitroglycerin Other (See Comments)     hypotension   • Penicillins Rash       Current Medications    Current Outpatient Medications:   •  Acetaminophen 500 MG coapsule, Take 1,000 mg by mouth As Needed for Mild Pain ., Disp: , Rfl:   •  apixaban (ELIQUIS) 5 MG tablet tablet, Take 5 mg by mouth 2 (Two) Times a Day. Currently in a trial and it is eliquis or placebo.  Patient is not sure ., Disp: , Rfl:   •  aspirin 81 MG EC tablet, Take 81 mg by mouth Daily. Pt in trial - may be a placebo, Disp: , Rfl:   •  atorvastatin (LIPITOR) 40 MG tablet, Take 1 tablet by mouth Daily., Disp: 90 tablet, Rfl: 1  •  cetirizine (zyrTEC) 10 MG tablet, Take 10 mg by mouth Daily., Disp: , Rfl:   •  clopidogrel (PLAVIX) 75 MG tablet, Take 1 tablet by mouth Daily., Disp: 90 tablet, Rfl: 3  •  Cyanocobalamin (VITAMIN B-12 PO), Take 5,000 mcg by mouth Daily., Disp: , Rfl:   •  doxycycline (VIBRAMYCIN) 100 MG capsule, Take 100 mg by mouth Daily., Disp: , Rfl:   •  hydroCHLOROthiazide  (HYDRODIURIL) 25 MG tablet, Take 0.5 tablets by mouth Daily., Disp: 45 tablet, Rfl: 1  •  lisinopril (PRINIVIL,ZESTRIL) 10 MG tablet, Take 1 tablet by mouth Every Morning., Disp: 90 tablet, Rfl: 0  •  Lumigan 0.01 % ophthalmic drops, Apply 1 drop to eye(s) as directed by provider Daily., Disp: , Rfl:   •  metoprolol tartrate (LOPRESSOR) 50 MG tablet, Take 1 tablet by mouth Every 12 (Twelve) Hours., Disp: 180 tablet, Rfl: 1  •  Multiple Vitamins-Minerals (MULTIVITAMIN ADULT PO), Take 1 tablet by mouth Daily., Disp: , Rfl:   •  tobramycin-dexamethasone (TOBRADEX) 0.3-0.1 % ophthalmic suspension, Administer 1 drop into the left eye Every 4 (Four) Hours While Awake., Disp: , Rfl:           ROS  Review of Systems   Cardiovascular: Negative for chest pain, dyspnea on exertion, irregular heartbeat and palpitations.   Respiratory: Negative for shortness of breath and sputum production.          SOCIAL HX  Social History     Socioeconomic History   • Marital status:      Spouse name: Not on file   • Number of children: Not on file   • Years of education: Not on file   • Highest education level: Not on file   Tobacco Use   • Smoking status: Never Smoker   • Smokeless tobacco: Never Used   • Tobacco comment: no passive smoke exposure   Substance and Sexual Activity   • Alcohol use: Yes     Comment: occassionally    • Drug use: No   • Sexual activity: Not Currently     Partners: Female       FAMILY HX  Family History   Problem Relation Age of Onset   • Hepatitis Mother    • Dementia Father    • Stroke Sister    • Hypertension Brother    • Heart attack Paternal Grandfather 70             Abbe Mendieta III, MD, [facility]

## 2021-05-28 RX ORDER — ATORVASTATIN CALCIUM 40 MG/1
40 TABLET, FILM COATED ORAL DAILY
Qty: 90 TABLET | Refills: 1 | Status: SHIPPED | OUTPATIENT
Start: 2021-05-28 | End: 2021-12-17 | Stop reason: SDUPTHER

## 2021-06-10 RX ORDER — LISINOPRIL 10 MG/1
10 TABLET ORAL EVERY MORNING
Qty: 90 TABLET | Refills: 0 | Status: SHIPPED | OUTPATIENT
Start: 2021-06-10 | End: 2021-07-19 | Stop reason: SDUPTHER

## 2021-06-14 ENCOUNTER — TELEPHONE (OUTPATIENT)
Dept: CARDIOLOGY | Facility: CLINIC | Age: 76
End: 2021-06-14

## 2021-06-14 NOTE — TELEPHONE ENCOUNTER
Farrah at [facility] notified that Lisinopril 10 mg daily is the correct dosage. Rx was sent in on 6/10/2021.

## 2021-06-14 NOTE — TELEPHONE ENCOUNTER
Farrah from [facility] is requesting clarification on his Lisinopril. Is he to be on 10 or 20 mg daily of Lisinopril? They have filled both in the past.Please advise.

## 2021-06-29 RX ORDER — METOPROLOL TARTRATE 50 MG/1
50 TABLET, FILM COATED ORAL EVERY 12 HOURS
Qty: 180 TABLET | Refills: 1 | Status: SHIPPED | OUTPATIENT
Start: 2021-06-29 | End: 2021-12-27

## 2021-06-29 NOTE — TELEPHONE ENCOUNTER
Medication Refill Request    Medication: metoprolol tartrate (LOPRESSOR) 50 MG tablet BID    Pertinent Labs:  Lab Results   Component Value Date    GLUCOSE 98 01/06/2014    BUN 18 04/21/2021    CREATININE 1.28 (H) 04/21/2021    EGFRIFNONA 54 (L) 04/21/2021    EGFRIFAFRI 66 04/21/2021    BCR 14.1 04/21/2021    K 4.5 04/21/2021    CO2 31.7 (H) 04/21/2021    CALCIUM 10.1 04/21/2021    ALBUMIN 4.80 04/21/2021    ALKPHOS 81 04/21/2021    AST 25 04/21/2021    ALT 25 04/21/2021      Lab Results   Component Value Date    CHLPL 179 04/21/2021    TRIG 252 (H) 04/21/2021    HDL 45 04/21/2021    LDL 92 04/21/2021     Lab Results   Component Value Date    HGBA1C 6.69 (H) 04/21/2021     Lab Results   Component Value Date    WBC 10.69 10/13/2020    HGB 16.5 10/13/2020    HCT 47.6 10/13/2020    MCV 94.4 10/13/2020     10/13/2020     Lab Results   Component Value Date    TSH 4.710 (H) 04/21/2021

## 2021-07-19 RX ORDER — LISINOPRIL 10 MG/1
10 TABLET ORAL EVERY MORNING
Qty: 90 TABLET | Refills: 1 | Status: SHIPPED | OUTPATIENT
Start: 2021-07-19 | End: 2022-06-10 | Stop reason: SDUPTHER

## 2021-09-15 ENCOUNTER — TELEPHONE (OUTPATIENT)
Dept: CARDIOLOGY | Facility: CLINIC | Age: 76
End: 2021-09-15

## 2021-09-15 DIAGNOSIS — I50.9 OTHER CONGESTIVE HEART FAILURE (HCC): ICD-10-CM

## 2021-09-15 DIAGNOSIS — I50.9 CONGESTIVE HEART FAILURE, UNSPECIFIED HF CHRONICITY, UNSPECIFIED HEART FAILURE TYPE (HCC): Primary | ICD-10-CM

## 2021-09-22 ENCOUNTER — PREP FOR SURGERY (OUTPATIENT)
Dept: OTHER | Facility: HOSPITAL | Age: 76
End: 2021-09-22

## 2021-09-22 DIAGNOSIS — I49.01 VF (VENTRICULAR FIBRILLATION) (HCC): Primary | ICD-10-CM

## 2021-09-22 RX ORDER — SODIUM CHLORIDE 0.9 % (FLUSH) 0.9 %
3 SYRINGE (ML) INJECTION EVERY 12 HOURS SCHEDULED
Status: CANCELLED | OUTPATIENT
Start: 2021-09-22

## 2021-09-22 RX ORDER — CEFAZOLIN SODIUM 2 G/100ML
2 INJECTION, SOLUTION INTRAVENOUS ONCE
Status: CANCELLED | OUTPATIENT
Start: 2021-09-22 | End: 2021-09-22

## 2021-09-22 RX ORDER — SODIUM CHLORIDE 0.9 % (FLUSH) 0.9 %
10 SYRINGE (ML) INJECTION AS NEEDED
Status: CANCELLED | OUTPATIENT
Start: 2021-09-22

## 2021-09-25 PROCEDURE — 93295 DEV INTERROG REMOTE 1/2/MLT: CPT | Performed by: INTERNAL MEDICINE

## 2021-09-25 PROCEDURE — 93296 REM INTERROG EVL PM/IDS: CPT | Performed by: INTERNAL MEDICINE

## 2021-09-27 RX ORDER — HYDROCHLOROTHIAZIDE 25 MG/1
TABLET ORAL
Qty: 45 TABLET | Refills: 1 | Status: SHIPPED | OUTPATIENT
Start: 2021-09-27 | End: 2022-03-28

## 2021-10-04 ENCOUNTER — PRE-ADMISSION TESTING (OUTPATIENT)
Dept: PREADMISSION TESTING | Facility: HOSPITAL | Age: 76
End: 2021-10-04

## 2021-10-04 DIAGNOSIS — I49.01 VF (VENTRICULAR FIBRILLATION) (HCC): ICD-10-CM

## 2021-10-04 LAB
ALBUMIN SERPL-MCNC: 4.4 G/DL (ref 3.5–5.2)
ALBUMIN/GLOB SERPL: 1.8 G/DL
ALP SERPL-CCNC: 84 U/L (ref 39–117)
ALT SERPL W P-5'-P-CCNC: 26 U/L (ref 1–41)
ANION GAP SERPL CALCULATED.3IONS-SCNC: 13 MMOL/L (ref 5–15)
AST SERPL-CCNC: 29 U/L (ref 1–40)
BASOPHILS # BLD AUTO: 0.04 10*3/MM3 (ref 0–0.2)
BASOPHILS NFR BLD AUTO: 0.5 % (ref 0–1.5)
BILIRUB SERPL-MCNC: 0.7 MG/DL (ref 0–1.2)
BUN SERPL-MCNC: 16 MG/DL (ref 8–23)
BUN/CREAT SERPL: 12.5 (ref 7–25)
CALCIUM SPEC-SCNC: 9.3 MG/DL (ref 8.6–10.5)
CHLORIDE SERPL-SCNC: 102 MMOL/L (ref 98–107)
CO2 SERPL-SCNC: 23 MMOL/L (ref 22–29)
CREAT SERPL-MCNC: 1.28 MG/DL (ref 0.76–1.27)
DEPRECATED RDW RBC AUTO: 45.5 FL (ref 37–54)
EOSINOPHIL # BLD AUTO: 0.34 10*3/MM3 (ref 0–0.4)
EOSINOPHIL NFR BLD AUTO: 3.9 % (ref 0.3–6.2)
ERYTHROCYTE [DISTWIDTH] IN BLOOD BY AUTOMATED COUNT: 12.5 % (ref 12.3–15.4)
GFR SERPL CREATININE-BSD FRML MDRD: 54 ML/MIN/1.73
GLOBULIN UR ELPH-MCNC: 2.5 GM/DL
GLUCOSE SERPL-MCNC: 215 MG/DL (ref 65–99)
HCT VFR BLD AUTO: 46 % (ref 37.5–51)
HGB BLD-MCNC: 15.4 G/DL (ref 13–17.7)
IMM GRANULOCYTES # BLD AUTO: 0.02 10*3/MM3 (ref 0–0.05)
IMM GRANULOCYTES NFR BLD AUTO: 0.2 % (ref 0–0.5)
LYMPHOCYTES # BLD AUTO: 2.19 10*3/MM3 (ref 0.7–3.1)
LYMPHOCYTES NFR BLD AUTO: 25.3 % (ref 19.6–45.3)
MAGNESIUM SERPL-MCNC: 1.9 MG/DL (ref 1.6–2.4)
MCH RBC QN AUTO: 32.8 PG (ref 26.6–33)
MCHC RBC AUTO-ENTMCNC: 33.5 G/DL (ref 31.5–35.7)
MCV RBC AUTO: 97.9 FL (ref 79–97)
MONOCYTES # BLD AUTO: 0.68 10*3/MM3 (ref 0.1–0.9)
MONOCYTES NFR BLD AUTO: 7.9 % (ref 5–12)
NEUTROPHILS NFR BLD AUTO: 5.38 10*3/MM3 (ref 1.7–7)
NEUTROPHILS NFR BLD AUTO: 62.2 % (ref 42.7–76)
NRBC BLD AUTO-RTO: 0 /100 WBC (ref 0–0.2)
PLATELET # BLD AUTO: 242 10*3/MM3 (ref 140–450)
PMV BLD AUTO: 9.9 FL (ref 6–12)
POTASSIUM SERPL-SCNC: 4.2 MMOL/L (ref 3.5–5.2)
PROT SERPL-MCNC: 6.9 G/DL (ref 6–8.5)
RBC # BLD AUTO: 4.7 10*6/MM3 (ref 4.14–5.8)
SODIUM SERPL-SCNC: 138 MMOL/L (ref 136–145)
WBC # BLD AUTO: 8.65 10*3/MM3 (ref 3.4–10.8)

## 2021-10-04 PROCEDURE — 80053 COMPREHEN METABOLIC PANEL: CPT

## 2021-10-04 PROCEDURE — 36415 COLL VENOUS BLD VENIPUNCTURE: CPT

## 2021-10-04 PROCEDURE — 85025 COMPLETE CBC W/AUTO DIFF WBC: CPT

## 2021-10-04 PROCEDURE — 83735 ASSAY OF MAGNESIUM: CPT

## 2021-10-04 NOTE — PAT
Positive COVID 10 Ady 2021 with negative nasal swab 2 weeks after diagnosis     Patient to apply Chlorhexadine wipes  to surgical area (as instructed) the night before procedure and the AM of procedure. Wipes provided.

## 2021-10-04 NOTE — DISCHARGE INSTRUCTIONS
Patient to apply Chlorhexadine wipes  to surgical area (as instructed) the night before procedure and the AM of procedure. Wipes provided.    Dear Patient,    Drink plenty of fluids the day before to ensure you are well hydrated, unless otherwise directed by your physician.    Do NOT eat after midnight the night before your procedure.   You may have clear liquids only up to three hours before your scheduled arrival time (Water is best, but clear liquids can also include coffee without cream or milk, fruit juice without pulp, clear broth, and clear gelatin).  During the three hour pre-procedure timeframe, NOTHING BY MOUTH (NPO).    We encourage you to drink 8 ounces of water three to four hours before your scheduled arrival time.    Take your medications as instructed by your doctor.    Following your procedure, be sure to drink plenty of fluids to continue flushing the kidneys if dye was utilized during your procedure (cardiac catheterization)    Benefits of hydrating before and after your procedure include:    -improved hydration helps prevent potential harm to the kidneys    by flushing the contrast/dye used during your procedure (if    applicable)    -Lower post-procedure complications    -Improved patient comfort     Do NOT smoke after midnight the night before your procedure.    Glasses and jewelry may be worn, but dentures must be removed prior to your procedure.    Leave any items you consider valuable at home.      MORNING of your Procedure, please bring the following:     -Photo ID and insurance card(s)    -ALL medications in their ORIGINAL CONTAINERS    -Co-pay and/or deductible required by your insurance   -Copy of living will or power of  document (if not brought to    Pre-Admission Testing department)   -CPAP mask and tubing, not your machine (if applicable)    -Relaxation aids (music, books, magazines)    Family members may wait in [facility] waiting area during procedure.    Need to make  "arrangements for transportation prior to discharge.    A handout regarding \"Heart Healthy Eating\" was provided today to encourage healthy eating habits.    Booklet published by [employer] was given in Pre-Admission testing.  This booklet is for informational purposes only.  If you have any questions about your procedure, please speak with your physician.    "

## 2021-10-06 ENCOUNTER — HOSPITAL ENCOUNTER (OUTPATIENT)
Facility: HOSPITAL | Age: 76
Setting detail: HOSPITAL OUTPATIENT SURGERY
Discharge: HOME | End: 2021-10-06
Attending: STUDENT IN AN ORGANIZED HEALTH CARE EDUCATION/TRAINING PROGRAM | Admitting: STUDENT IN AN ORGANIZED HEALTH CARE EDUCATION/TRAINING PROGRAM

## 2021-10-06 VITALS
DIASTOLIC BLOOD PRESSURE: 87 MMHG | HEART RATE: 69 BPM | TEMPERATURE: 97.2 F | BODY MASS INDEX: 32.45 KG/M2 | SYSTOLIC BLOOD PRESSURE: 140 MMHG | HEIGHT: 78 IN | WEIGHT: 280.43 LBS | OXYGEN SATURATION: 94 % | RESPIRATION RATE: 13 BRPM

## 2021-10-06 DIAGNOSIS — I49.01 VF (VENTRICULAR FIBRILLATION) (HCC): ICD-10-CM

## 2021-10-06 DIAGNOSIS — I50.9 CONGESTIVE HEART FAILURE, UNSPECIFIED HF CHRONICITY, UNSPECIFIED HEART FAILURE TYPE (HCC): ICD-10-CM

## 2021-10-06 PROBLEM — M25.569 PAIN, KNEE: Status: RESOLVED | Noted: 2019-03-22 | Resolved: 2021-10-06

## 2021-10-06 PROBLEM — M25.552 LEFT HIP PAIN: Status: RESOLVED | Noted: 2019-03-22 | Resolved: 2021-10-06

## 2021-10-06 PROBLEM — I20.9 ANGINA PECTORIS: Status: RESOLVED | Noted: 2019-03-22 | Resolved: 2021-10-06

## 2021-10-06 PROBLEM — Z13.9 ENCOUNTER FOR SCREENING: Status: RESOLVED | Noted: 2019-03-22 | Resolved: 2021-10-06

## 2021-10-06 PROCEDURE — C1721 AICD, DUAL CHAMBER: HCPCS | Performed by: STUDENT IN AN ORGANIZED HEALTH CARE EDUCATION/TRAINING PROGRAM

## 2021-10-06 PROCEDURE — 33263 RMVL & RPLCMT DFB GEN 2 LEAD: CPT | Performed by: STUDENT IN AN ORGANIZED HEALTH CARE EDUCATION/TRAINING PROGRAM

## 2021-10-06 PROCEDURE — 25010000003 CEFAZOLIN IN DEXTROSE 2-4 GM/100ML-% SOLUTION: Performed by: PHYSICIAN ASSISTANT

## 2021-10-06 PROCEDURE — 99153 MOD SED SAME PHYS/QHP EA: CPT | Performed by: STUDENT IN AN ORGANIZED HEALTH CARE EDUCATION/TRAINING PROGRAM

## 2021-10-06 PROCEDURE — 99152 MOD SED SAME PHYS/QHP 5/>YRS: CPT | Performed by: STUDENT IN AN ORGANIZED HEALTH CARE EDUCATION/TRAINING PROGRAM

## 2021-10-06 PROCEDURE — 25010000002 ONDANSETRON PER 1 MG: Performed by: STUDENT IN AN ORGANIZED HEALTH CARE EDUCATION/TRAINING PROGRAM

## 2021-10-06 PROCEDURE — 25010000002 MIDAZOLAM PER 1 MG: Performed by: STUDENT IN AN ORGANIZED HEALTH CARE EDUCATION/TRAINING PROGRAM

## 2021-10-06 PROCEDURE — S0260 H&P FOR SURGERY: HCPCS | Performed by: PHYSICIAN ASSISTANT

## 2021-10-06 PROCEDURE — 25010000002 FENTANYL CITRATE (PF) 50 MCG/ML SOLUTION: Performed by: STUDENT IN AN ORGANIZED HEALTH CARE EDUCATION/TRAINING PROGRAM

## 2021-10-06 PROCEDURE — 25010000003 LIDOCAINE 1 % SOLUTION: Performed by: STUDENT IN AN ORGANIZED HEALTH CARE EDUCATION/TRAINING PROGRAM

## 2021-10-06 DEVICE — IMPLANTABLE DEVICE: Type: IMPLANTABLE DEVICE | Status: FUNCTIONAL

## 2021-10-06 RX ORDER — BUPIVACAINE HYDROCHLORIDE 5 MG/ML
INJECTION, SOLUTION PERINEURAL AS NEEDED
Status: DISCONTINUED | OUTPATIENT
Start: 2021-10-06 | End: 2021-10-06 | Stop reason: HOSPADM

## 2021-10-06 RX ORDER — SODIUM CHLORIDE 0.9 % (FLUSH) 0.9 %
3 SYRINGE (ML) INJECTION EVERY 12 HOURS SCHEDULED
Status: DISCONTINUED | OUTPATIENT
Start: 2021-10-06 | End: 2021-10-06 | Stop reason: HOSPADM

## 2021-10-06 RX ORDER — SODIUM CHLORIDE 0.9 % (FLUSH) 0.9 %
10 SYRINGE (ML) INJECTION AS NEEDED
Status: DISCONTINUED | OUTPATIENT
Start: 2021-10-06 | End: 2021-10-06 | Stop reason: HOSPADM

## 2021-10-06 RX ORDER — CEFAZOLIN SODIUM 2 G/100ML
2 INJECTION, SOLUTION INTRAVENOUS ONCE
Status: COMPLETED | OUTPATIENT
Start: 2021-10-06 | End: 2021-10-06

## 2021-10-06 RX ORDER — LIDOCAINE HYDROCHLORIDE 10 MG/ML
INJECTION, SOLUTION INFILTRATION; PERINEURAL AS NEEDED
Status: DISCONTINUED | OUTPATIENT
Start: 2021-10-06 | End: 2021-10-06 | Stop reason: HOSPADM

## 2021-10-06 RX ORDER — CEPHALEXIN 500 MG/1
500 CAPSULE ORAL 3 TIMES DAILY
Qty: 15 CAPSULE | Refills: 0 | Status: SHIPPED | OUTPATIENT
Start: 2021-10-06 | End: 2021-10-29

## 2021-10-06 RX ORDER — FENTANYL CITRATE 50 UG/ML
INJECTION, SOLUTION INTRAMUSCULAR; INTRAVENOUS AS NEEDED
Status: DISCONTINUED | OUTPATIENT
Start: 2021-10-06 | End: 2021-10-06 | Stop reason: HOSPADM

## 2021-10-06 RX ORDER — MIDAZOLAM HYDROCHLORIDE 1 MG/ML
INJECTION INTRAMUSCULAR; INTRAVENOUS AS NEEDED
Status: DISCONTINUED | OUTPATIENT
Start: 2021-10-06 | End: 2021-10-06 | Stop reason: HOSPADM

## 2021-10-06 RX ORDER — ONDANSETRON 2 MG/ML
INJECTION INTRAMUSCULAR; INTRAVENOUS AS NEEDED
Status: DISCONTINUED | OUTPATIENT
Start: 2021-10-06 | End: 2021-10-06 | Stop reason: HOSPADM

## 2021-10-06 RX ORDER — SODIUM CHLORIDE 9 MG/ML
INJECTION, SOLUTION INTRAVENOUS CONTINUOUS PRN
Status: COMPLETED | OUTPATIENT
Start: 2021-10-06 | End: 2021-10-06

## 2021-10-06 RX ADMIN — CEFAZOLIN SODIUM 2 G: 2 INJECTION, SOLUTION INTRAVENOUS at 08:35

## 2021-10-06 NOTE — PLAN OF CARE
Goal Outcome Evaluation:  Plan of Care Reviewed With: patient        Progress: improving  Outcome Summary: Patient sitting up in bed, VSS on room air. Patient is able to ambulate independently in the hallway. Dressing on left chest remains clean, dry and intact. Discharge teaching completed. Pt understands to take off tegaderm tomorrow morning and leave steri-strips in place until f/u wound check.

## 2021-10-06 NOTE — H&P
[facility]    Date of Hospital Visit: 10/06/21    Place of Service: [facility]    Patient Name: Abbe Palacios  :1945      Primary Care Provider: Xavier Briones MD    Chief complaint/Reason for Consultation:  Cardiomyopathy    Problem List:    Coronary artery disease    VF (ventricular fibrillation) (HCC)    Hypertrophy of prostate without urinary obstruction    Mixed hyperlipidemia    Benign essential hypertension    Diabetic nephropathy associated with type 2 diabetes mellitus (HCC)    Chronic kidney disease, stage III (moderate) ([facility])          History of Present Illness:  Is a 76-year-old hypertensive dyslipidemic male with CAD with a history of rashad-infarction VT for which she had ICD implantation in .  He had a generator change in .  His most recent remote interrogation showed near MIKEL.  He presents today for generator change.  He has no complaint of exertional chest pain or dyspnea no orthopnea no PND no claudication.        Allergies   Allergen Reactions   • Biaxin [Clarithromycin] Nausea Only   • Clindamycin/Lincomycin Other (See Comments)     Caused incontinence   • Levaquin [Levofloxacin] Nausea Only   • Nitroglycerin Other (See Comments)     hypotension   • Penicillins Rash       Current Outpatient Medications   Medication Instructions   • Acetaminophen 1,000 mg, Oral, As Needed   • apixaban (ELIQUIS) 5 mg, Oral, 2 Times Daily, Currently in a trial and it is eliquis or placebo.  Patient is not sure .   • aspirin 81 mg, Oral, Daily, Pt in trial - may be a placebo   • atorvastatin (LIPITOR) 40 mg, Oral, Daily   • cetirizine (ZYRTEC) 10 mg, Oral, Nightly   • clopidogrel (PLAVIX) 75 mg, Oral, Daily   • Cyanocobalamin (VITAMIN B-12 PO) 100 mcg, Oral, Daily   • hydroCHLOROthiazide (HYDRODIURIL) 25 MG tablet TAKE 1/2 TABLET BY MOUTH DAILY   • lisinopril (PRINIVIL,ZESTRIL) 10 mg, Oral, Every Morning   • Lumigan 0.01 % ophthalmic drops 1 drop, Both Eyes,  "Nightly   • metoprolol tartrate (LOPRESSOR) 50 mg, Oral, Every 12 Hours   • Multiple Vitamins-Minerals (MULTIVITAMIN ADULT PO) 1 tablet, Oral, Daily           Social History     Socioeconomic History   • Marital status:      Spouse name: Not on file   • Number of children: Not on file   • Years of education: Not on file   • Highest education level: Not on file   Tobacco Use   • Smoking status: Never Smoker   • Smokeless tobacco: Never Used   • Tobacco comment: no passive smoke exposure   Vaping Use   • Vaping Use: Never used   Substance and Sexual Activity   • Alcohol use: Yes     Comment: occassionally  1 per month    • Drug use: No   • Sexual activity: Defer     Partners: Female       Family History   Problem Relation Age of Onset   • Hepatitis Mother    • Dementia Father    • Stroke Sister    • Hypertension Brother    • Heart attack Paternal Grandfather 70       REVIEW OF SYSTEMS:   Review of Systems   Constitutional: Negative for diaphoresis, malaise/fatigue and weight gain.   Cardiovascular: Negative for chest pain, claudication, dyspnea on exertion, irregular heartbeat, leg swelling, orthopnea, palpitations, paroxysmal nocturnal dyspnea and syncope.   Respiratory: Negative for cough, shortness of breath, sleep disturbances due to breathing and wheezing.    Hematologic/Lymphatic: Negative for bleeding problem.   Musculoskeletal: Negative for muscle cramps, muscle weakness and myalgias.   Gastrointestinal: Negative for heartburn.   Neurological: Negative for weakness.            Objective:  Vitals:    10/06/21 0640 10/06/21 0642   BP: 128/90 146/91   BP Location: Right arm Left arm   Patient Position: Lying Lying   Pulse: 72    Resp: 16    Temp: 97.2 °F (36.2 °C)    TempSrc: Tympanic    SpO2: 94%    Weight: 127 kg (280 lb 6.8 oz)    Height: 198.1 cm (78\")      Body mass index is 32.41 kg/m².  Flowsheet Rows      First Filed Value   Admission Height  198.1 cm (78\") Documented at 10/06/2021 0640   Admission " Weight  127 kg (280 lb 6.8 oz) Documented at 10/06/2021 0640          Constitutional:       Appearance: Well-developed.   Pulmonary:      Effort: Pulmonary effort is normal. No respiratory distress.      Breath sounds: Normal breath sounds. No wheezing. No rales.      Comments: Bases clear  Chest:      Chest wall: Not tender to palpatation.   Cardiovascular:      Normal rate. Regular rhythm.      Murmurs: There is no murmur.      No gallop. No click. No rub.   Pulses:     Intact distal pulses.   Edema:     Peripheral edema absent.   Musculoskeletal: Normal range of motion.             Lab Review:       Results from last 7 days   Lab Units 10/04/21  0915   SODIUM mmol/L 138   POTASSIUM mmol/L 4.2   CHLORIDE mmol/L 102   CO2 mmol/L 23.0   BUN mg/dL 16   CREATININE mg/dL 1.28*   GLUCOSE mg/dL 215*   CALCIUM mg/dL 9.3     Results from last 7 days   Lab Units 10/04/21  0915   WBC 10*3/mm3 8.65   HEMOGLOBIN g/dL 15.4   HEMATOCRIT % 46.0   PLATELETS 10*3/mm3 242     Estimated Creatinine Clearance: 71.3 mL/min (A) (by C-G formula based on SCr of 1.28 mg/dL (H)).    Lab Results   Component Value Date    CHLPL 179 04/21/2021    TRIG 252 (H) 04/21/2021    HDL 45 04/21/2021    LDL 92 04/21/2021             Assessment:   · ICD at MIKEL        Plan:   · ICD generator change        Electronically signed by ANGELA Coronel, 10/06/21, 7:48 AM EDT.

## 2021-10-29 ENCOUNTER — OFFICE VISIT (OUTPATIENT)
Dept: INTERNAL MEDICINE | Facility: CLINIC | Age: 76
End: 2021-10-29

## 2021-10-29 ENCOUNTER — LAB (OUTPATIENT)
Dept: LAB | Facility: HOSPITAL | Age: 76
End: 2021-10-29

## 2021-10-29 VITALS
TEMPERATURE: 97.1 F | WEIGHT: 277.2 LBS | HEART RATE: 72 BPM | SYSTOLIC BLOOD PRESSURE: 122 MMHG | DIASTOLIC BLOOD PRESSURE: 84 MMHG | HEIGHT: 78 IN | BODY MASS INDEX: 32.07 KG/M2

## 2021-10-29 DIAGNOSIS — E78.2 MIXED HYPERLIPIDEMIA: ICD-10-CM

## 2021-10-29 DIAGNOSIS — Z11.59 ENCOUNTER FOR HEPATITIS C SCREENING TEST FOR LOW RISK PATIENT: ICD-10-CM

## 2021-10-29 DIAGNOSIS — I10 BENIGN ESSENTIAL HYPERTENSION: ICD-10-CM

## 2021-10-29 DIAGNOSIS — E11.21 DIABETIC NEPHROPATHY ASSOCIATED WITH TYPE 2 DIABETES MELLITUS (HCC): ICD-10-CM

## 2021-10-29 DIAGNOSIS — I25.10 CORONARY ARTERY DISEASE INVOLVING NATIVE CORONARY ARTERY OF NATIVE HEART WITHOUT ANGINA PECTORIS: ICD-10-CM

## 2021-10-29 DIAGNOSIS — N18.31 STAGE 3A CHRONIC KIDNEY DISEASE (HCC): ICD-10-CM

## 2021-10-29 DIAGNOSIS — Z00.00 PREVENTATIVE HEALTH CARE: Primary | ICD-10-CM

## 2021-10-29 DIAGNOSIS — E03.9 HYPOTHYROIDISM (ACQUIRED): ICD-10-CM

## 2021-10-29 PROBLEM — L91.8 INFLAMED SKIN TAG: Status: RESOLVED | Noted: 2019-03-22 | Resolved: 2021-10-29

## 2021-10-29 PROCEDURE — 99397 PER PM REEVAL EST PAT 65+ YR: CPT | Performed by: INTERNAL MEDICINE

## 2021-10-29 PROCEDURE — 96160 PT-FOCUSED HLTH RISK ASSMT: CPT | Performed by: INTERNAL MEDICINE

## 2021-10-29 PROCEDURE — 1170F FXNL STATUS ASSESSED: CPT | Performed by: INTERNAL MEDICINE

## 2021-10-29 PROCEDURE — G0439 PPPS, SUBSEQ VISIT: HCPCS | Performed by: INTERNAL MEDICINE

## 2021-10-29 PROCEDURE — 1126F AMNT PAIN NOTED NONE PRSNT: CPT | Performed by: INTERNAL MEDICINE

## 2021-10-29 PROCEDURE — G0008 ADMIN INFLUENZA VIRUS VAC: HCPCS | Performed by: INTERNAL MEDICINE

## 2021-10-29 PROCEDURE — 90662 IIV NO PRSV INCREASED AG IM: CPT | Performed by: INTERNAL MEDICINE

## 2021-10-29 NOTE — PROGRESS NOTES
QUICK REFERENCE INFORMATION:  The ABCs of the Annual Wellness Visit    Subsequent Medicare Wellness Visit    HEALTH RISK ASSESSMENT    1945    Recent Hospitalizations:  No hospitalization(s) within the last year..        Current Medical Providers:  Patient Care Team:  Xavier Briones MD as PCP - General  Xavier Briones MD as PCP - Family Medicine  Abbe Mendieta III, MD as Cardiologist (Cardiology)        Smoking Status:  Social History     Tobacco Use   Smoking Status Never Smoker   Smokeless Tobacco Never Used   Tobacco Comment    no passive smoke exposure       Alcohol Consumption:  Social History     Substance and Sexual Activity   Alcohol Use Yes    Comment: occassionally  1 per month        Depression Screen:   PHQ-2/PHQ-9 Depression Screening 10/29/2021   Little interest or pleasure in doing things 0   Feeling down, depressed, or hopeless 0   Total Score 0       Health Habits and Functional and Cognitive Screening:  Functional & Cognitive Status 10/29/2021   Do you have difficulty preparing food and eating? No   Do you have difficulty bathing yourself, getting dressed or grooming yourself? No   Do you have difficulty using the toilet? No   Do you have difficulty moving around from place to place? No   Do you have trouble with steps or getting out of a bed or a chair? No   Current Diet Well Balanced Diet   Dental Exam Up to date   Eye Exam Up to date   Exercise (times per week) 4 times per week        Exercise Frequency Comment 4-5   Current Exercises Include Walking   Current Exercise Activities Include -   Do you need help using the phone?  No   Are you deaf or do you have serious difficulty hearing?  No   Do you need help with transportation? No   Do you need help shopping? No   Do you need help preparing meals?  No   Do you need help with housework?  No   Do you need help with laundry? No   Do you need help taking your medications? No   Do you need help managing money? No   Do you ever  drive or ride in a car without wearing a seat belt? No   Have you felt unusual stress, anger or loneliness in the last month? No   Who do you live with? Spouse   If you need help, do you have trouble finding someone available to you? No   Have you been bothered in the last four weeks by sexual problems? No   Do you have difficulty concentrating, remembering or making decisions? No       Fall Risk Screen:  MIK Fall Risk Assessment was completed, and patient is at LOW risk for falls.Assessment completed on:10/29/2021    ACE III MINI        Does the patient have evidence of cognitive impairment? No    Aspirin use counseling: Taking ASA appropriately as indicated    Recent Lab Results:  CMP:  Lab Results   Component Value Date     (H) 04/21/2021    BUN 16 10/04/2021    CREATININE 1.28 (H) 10/04/2021    EGFRIFNONA 54 (L) 10/04/2021    EGFRIFAFRI 66 04/21/2021    BCR 12.5 10/04/2021     10/04/2021    K 4.2 10/04/2021    CO2 23.0 10/04/2021    CALCIUM 9.3 10/04/2021    PROTENTOTREF 7.4 04/21/2021    ALBUMIN 4.40 10/04/2021    LABGLOBREF 2.6 04/21/2021    LABIL2 1.8 04/21/2021    BILITOT 0.7 10/04/2021    ALKPHOS 84 10/04/2021    AST 29 10/04/2021    ALT 26 10/04/2021     HbA1c:  Lab Results   Component Value Date    HGBA1C 6.69 (H) 04/21/2021    HGBA1C 6.70 (H) 10/13/2020     Microalbumin:  Lab Results   Component Value Date    MICROALBUR 15.0 10/13/2020     Lipid Panel  Lab Results   Component Value Date    TRIG 252 (H) 04/21/2021    HDL 45 04/21/2021    LDL 92 04/21/2021    AST 29 10/04/2021    ALT 26 10/04/2021       Visual Acuity:  No exam data present    Age-appropriate Screening Schedule:  Refer to the list below for future screening recommendations based on patient's age, sex and/or medical conditions. Orders for these recommended tests are listed in the plan section. The patient has been provided with a written plan.    Health Maintenance   Topic Date Due   • INFLUENZA VACCINE  08/01/2021   • URINE  MICROALBUMIN  10/13/2021   • HEMOGLOBIN A1C  10/21/2021   • LIPID PANEL  04/21/2022   • DIABETIC EYE EXAM  04/26/2022   • TDAP/TD VACCINES (2 - Td or Tdap) 09/27/2022   • ZOSTER VACCINE  Completed        Subjective   History of Present Illness    Abbe Palacios is a 76 y.o. male who presents for a Subsequent Wellness Visit.    CHRONIC CONDITIONS    The following portions of the patient's history were reviewed and updated as appropriate: allergies, current medications, past family history, past medical history, past social history, past surgical history and problem list.    Outpatient Medications Prior to Visit   Medication Sig Dispense Refill   • Acetaminophen 500 MG coapsule Take 1,000 mg by mouth As Needed for Mild Pain .     • apixaban (ELIQUIS) 5 MG tablet tablet Take 5 mg by mouth 2 (Two) Times a Day. Currently in a trial and it is eliquis or placebo.  Patient is not sure .     • aspirin 81 MG EC tablet Take 81 mg by mouth Daily. Pt in trial - may be a placebo     • atorvastatin (LIPITOR) 40 MG tablet Take 1 tablet by mouth Daily. 90 tablet 1   • cetirizine (zyrTEC) 10 MG tablet Take 10 mg by mouth Every Night.     • clopidogrel (PLAVIX) 75 MG tablet Take 1 tablet by mouth Daily. 90 tablet 3   • Cyanocobalamin (VITAMIN B-12 PO) Take 100 mcg by mouth Daily.     • hydroCHLOROthiazide (HYDRODIURIL) 25 MG tablet TAKE 1/2 TABLET BY MOUTH DAILY (Patient taking differently: Take 12.5 mg by mouth Daily.) 45 tablet 1   • lisinopril (PRINIVIL,ZESTRIL) 10 MG tablet Take 1 tablet by mouth Every Morning. 90 tablet 1   • Lumigan 0.01 % ophthalmic drops Administer 1 drop to both eyes Every Night.     • metoprolol tartrate (LOPRESSOR) 50 MG tablet Take 1 tablet by mouth Every 12 (Twelve) Hours. 180 tablet 1   • Multiple Vitamins-Minerals (MULTIVITAMIN ADULT PO) Take 1 tablet by mouth Daily.     • cephalexin (Keflex) 500 MG capsule Take 1 capsule by mouth 3 (Three) Times a Day. 15 capsule 0     No facility-administered  medications prior to visit.       Patient Active Problem List   Diagnosis   • Coronary artery disease   • Peripheral neuropathy   • VF (ventricular fibrillation) ([facility])   • Hypertrophy of prostate without urinary obstruction   • Diabetic polyneuropathy associated with type 2 diabetes mellitus ([facility])   • Obesity   • Mixed hyperlipidemia   • Benign essential hypertension   • Seasonal allergic rhinitis   • Diabetic nephropathy associated with type 2 diabetes mellitus ([facility])   • Chronic kidney disease, stage III (moderate) ([facility])       Advance Care Planning:  ACP discussion was held with the patient during this visit. Patient has an advance directive in EMR which is still valid.     Identification of Risk Factors:  Risk factors include: Advance Directive Discussion.    Review of Systems   Constitutional: Negative for chills, fatigue and fever.   HENT: Negative for congestion, ear pain and sinus pressure.    Respiratory: Negative for cough, chest tightness, shortness of breath and wheezing.    Cardiovascular: Negative for chest pain and palpitations.   Gastrointestinal: Negative for abdominal pain, blood in stool and constipation.   Skin: Negative for color change.   Allergic/Immunologic: Negative for environmental allergies.   Neurological: Negative for dizziness, speech difficulty and headaches.   Psychiatric/Behavioral: Negative for confusion. The patient is not nervous/anxious.        Compared to one year ago, the patient feels his physical health is the same.  Compared to one year ago, the patient feels his mental health is the same.    Objective     Physical Exam  Vitals and nursing note reviewed.   Constitutional:       Appearance: He is well-developed.   HENT:      Head: Normocephalic and atraumatic.      Right Ear: External ear normal.      Left Ear: External ear normal.      Nose: Nose normal.      Mouth/Throat:      Pharynx: No oropharyngeal exudate.   Eyes:      Conjunctiva/sclera: Conjunctivae normal.       Pupils: Pupils are equal, round, and reactive to light.   Neck:      Thyroid: No thyromegaly.      Vascular: No JVD.   Cardiovascular:      Rate and Rhythm: Normal rate and regular rhythm.      Pulses:           Dorsalis pedis pulses are 1+ on the right side and 1+ on the left side.      Heart sounds: Normal heart sounds. No murmur heard.  No friction rub. No gallop.    Pulmonary:      Effort: Pulmonary effort is normal. No respiratory distress.      Breath sounds: Normal breath sounds. No wheezing or rales.   Chest:      Chest wall: No tenderness.   Abdominal:      General: Bowel sounds are normal. There is no distension.      Palpations: Abdomen is soft. There is no mass.      Tenderness: There is no abdominal tenderness. There is no guarding or rebound.      Hernia: No hernia is present.   Musculoskeletal:         General: No tenderness. Normal range of motion.      Cervical back: Normal range of motion and neck supple.      Right foot: No deformity.      Left foot: No deformity.   Feet:      Right foot:      Protective Sensation: 5 sites tested. 5 sites sensed.      Skin integrity: Skin integrity normal.      Left foot:      Protective Sensation: 5 sites tested. 5 sites sensed.      Skin integrity: Skin integrity normal.      Comments: Sensation in both feet slightly reduced to monofilament.     Diabetic Foot Exam Performed and Monofilament Test Performed    Lymphadenopathy:      Cervical: No cervical adenopathy.   Skin:     General: Skin is warm and dry.      Findings: No erythema or rash.   Neurological:      Mental Status: He is alert and oriented to person, place, and time.      Cranial Nerves: No cranial nerve deficit.      Sensory: No sensory deficit.      Motor: No abnormal muscle tone.      Coordination: Coordination normal.      Deep Tendon Reflexes: Reflexes normal.   Psychiatric:         Behavior: Behavior normal.         Thought Content: Thought content normal.         Judgment: Judgment normal.       "    Procedures     Vitals:    10/29/21 0836   BP: 122/84   BP Location: Left arm   Patient Position: Sitting   Cuff Size: Large Adult   Pulse: 72   Temp: 97.1 °F (36.2 °C)   Weight: 126 kg (277 lb 3.2 oz)   Height: 198.1 cm (78\")   PainSc: 0-No pain       Patient's Body mass index is 32.03 kg/m². indicating that he is obese (BMI >30). Obesity-related health conditions include the following: hypertension, coronary heart disease, diabetes mellitus and dyslipidemias. Obesity is unchanged. BMI is is above average; BMI management plan is completed. We discussed portion control, increasing exercise and joining a fitness center or start home based exercise program..      Assessment/Plan   Problem List Items Addressed This Visit        Cardiac and Vasculature    Coronary artery disease    Overview     a. Status post stenting of right coronary artery, bare-metal stent for acute myocardial infarction, 11/07/2007.  b. Status post elective stenting of proximal left anterior descending coronary artery, 11/14/2007.  c. Recurrent chest pain with non-STEMI, status post cardiac catheterization, 01/24/2013, Dr. Jarrett:  PTCA and stenting of the first diagonal using a 2.5 x 24 mm Promus drug-eluting stent, PTCA and stenting of the ramus intermedius using a 2.25 x 20 mm Promus drug-eluting stent.           Relevant Medications    clopidogrel (PLAVIX) 75 MG tablet    metoprolol tartrate (LOPRESSOR) 50 MG tablet    Mixed hyperlipidemia    Relevant Medications    atorvastatin (LIPITOR) 40 MG tablet    Other Relevant Orders    Lipid Panel    Benign essential hypertension    Relevant Medications    metoprolol tartrate (LOPRESSOR) 50 MG tablet    lisinopril (PRINIVIL,ZESTRIL) 10 MG tablet    hydroCHLOROthiazide (HYDRODIURIL) 25 MG tablet       Genitourinary and Reproductive     Diabetic nephropathy associated with type 2 diabetes mellitus (HCC)    Relevant Medications    hydroCHLOROthiazide (HYDRODIURIL) 25 MG tablet    Other Relevant Orders "    Hemoglobin A1c    Microalbumin / Creatinine Urine Ratio - Urine, Clean Catch    Chronic kidney disease, stage III (moderate) (HCC)    Relevant Medications    hydroCHLOROthiazide (HYDRODIURIL) 25 MG tablet      Other Visit Diagnoses     Preventative health care    -  Primary    Hypothyroidism (acquired)        Relevant Orders    TSH    Encounter for hepatitis C screening test for low risk patient        Relevant Orders    Hepatitis C Antibody        Patient Self-Management and Personalized Health Advice  The patient has been provided with information about: diet, exercise and weight management and preventive services including:   · Annual Wellness Visit (AWV).    Outpatient Encounter Medications as of 10/29/2021   Medication Sig Dispense Refill   • Acetaminophen 500 MG coapsule Take 1,000 mg by mouth As Needed for Mild Pain .     • apixaban (ELIQUIS) 5 MG tablet tablet Take 5 mg by mouth 2 (Two) Times a Day. Currently in a trial and it is eliquis or placebo.  Patient is not sure .     • aspirin 81 MG EC tablet Take 81 mg by mouth Daily. Pt in trial - may be a placebo     • atorvastatin (LIPITOR) 40 MG tablet Take 1 tablet by mouth Daily. 90 tablet 1   • cetirizine (zyrTEC) 10 MG tablet Take 10 mg by mouth Every Night.     • clopidogrel (PLAVIX) 75 MG tablet Take 1 tablet by mouth Daily. 90 tablet 3   • Cyanocobalamin (VITAMIN B-12 PO) Take 100 mcg by mouth Daily.     • hydroCHLOROthiazide (HYDRODIURIL) 25 MG tablet TAKE 1/2 TABLET BY MOUTH DAILY (Patient taking differently: Take 12.5 mg by mouth Daily.) 45 tablet 1   • lisinopril (PRINIVIL,ZESTRIL) 10 MG tablet Take 1 tablet by mouth Every Morning. 90 tablet 1   • Lumigan 0.01 % ophthalmic drops Administer 1 drop to both eyes Every Night.     • metoprolol tartrate (LOPRESSOR) 50 MG tablet Take 1 tablet by mouth Every 12 (Twelve) Hours. 180 tablet 1   • Multiple Vitamins-Minerals (MULTIVITAMIN ADULT PO) Take 1 tablet by mouth Daily.     • [DISCONTINUED] cephalexin  (Keflex) 500 MG capsule Take 1 capsule by mouth 3 (Three) Times a Day. 15 capsule 0     No facility-administered encounter medications on file as of 10/29/2021.     Plan    Overall he is doing very well with reasonably good lifestyle habits.  Once again we discussed the importance of weight loss.  Flu vaccine is administered today, he will get his Covid booster in the next few weeks.    A1c is pending, foot exam was performed today, eye exam was done earlier this year.  He will continue efforts at healthy diet.    Lipid panel is pending, the treatment remains healthy diet and atorvastatin.    TSH is pending, he is clinically euthyroid.    Coronary artery disease is asymptomatic on metoprolol, lisinopril,, atorvastatin and aspirin.    Blood pressure is controlled on metoprolol, lisinopril and hydrochlorothiazide.    GFR is pending, the treatment remains control of blood pressure and blood glucose and avoidance of NSAIDs.    Reviewed use of high risk medication in the elderly: yes  Reviewed for potential of harmful drug interactions in the elderly: yes    Follow Up:  Return in about 6 months (around 4/29/2022) for follow up.     There are no Patient Instructions on file for this visit.    An After Visit Summary and PPPS with all of these plans were given to the patient.

## 2021-11-01 LAB
CHOLEST SERPL-MCNC: 176 MG/DL (ref 0–200)
HBA1C MFR BLD: 6.8 % (ref 4.8–5.6)
HCV AB S/CO SERPL IA: <0.1 S/CO RATIO (ref 0–0.9)
HDLC SERPL-MCNC: 44 MG/DL (ref 40–60)
LDLC SERPL CALC-MCNC: 89 MG/DL (ref 0–100)
TRIGL SERPL-MCNC: 256 MG/DL (ref 0–150)
TSH SERPL DL<=0.005 MIU/L-ACNC: 4.73 UIU/ML (ref 0.27–4.2)
UNABLE TO VOID: NORMAL
VLDLC SERPL CALC-MCNC: 43 MG/DL (ref 5–40)

## 2021-11-10 ENCOUNTER — OFFICE VISIT (OUTPATIENT)
Dept: CARDIOLOGY | Facility: CLINIC | Age: 76
End: 2021-11-10

## 2021-11-10 VITALS
HEART RATE: 96 BPM | HEIGHT: 78 IN | SYSTOLIC BLOOD PRESSURE: 110 MMHG | BODY MASS INDEX: 32.51 KG/M2 | OXYGEN SATURATION: 96 % | DIASTOLIC BLOOD PRESSURE: 62 MMHG | WEIGHT: 281 LBS

## 2021-11-10 DIAGNOSIS — I10 BENIGN ESSENTIAL HYPERTENSION: ICD-10-CM

## 2021-11-10 DIAGNOSIS — E78.2 MIXED HYPERLIPIDEMIA: ICD-10-CM

## 2021-11-10 DIAGNOSIS — I25.10 CORONARY ARTERY DISEASE INVOLVING NATIVE CORONARY ARTERY OF NATIVE HEART WITHOUT ANGINA PECTORIS: Primary | ICD-10-CM

## 2021-11-10 PROCEDURE — 93283 PRGRMG EVAL IMPLANTABLE DFB: CPT | Performed by: INTERNAL MEDICINE

## 2021-11-10 PROCEDURE — 99214 OFFICE O/P EST MOD 30 MIN: CPT | Performed by: INTERNAL MEDICINE

## 2021-11-10 RX ORDER — KETOCONAZOLE 20 MG/G
CREAM TOPICAL AS NEEDED
COMMUNITY
Start: 2021-11-02 | End: 2022-03-04

## 2021-11-10 NOTE — PROGRESS NOTES
[facility] at [facility]  Office visit  Abbe Palacios  1945  [phone]    VISIT DATE:  11/10/2021      PCP: [facility] Briones MD  [address] [facility] [address] [address]    CC:  Chief Complaint   Patient presents with   • Chronic systolic (congestive) heart failure (CMS/[facility])       PROBLEM LIST:  1.  Coronary artery disease:  a.  Status post stenting of right coronary artery, bare-metal stent for acute myocardial infarction, 11/07/2007.  b. Status post elective stenting of proximal left anterior descending coronary artery, 11/14/2007.  c. Elina-infarction VF and post-infarction  ventricular ectopy with positive EP study culminating in dual-chamber ICD implantation, November 2007.  d. Recurrent chest pain with non-STEMI, status post cardiac catheterization, 01/24/2013, Dr. Jarrett:  PTCA and stenting of the first diagonal using  a 2.5 x 24 mm Promus drug-eluting stent, PTCA and stenting of the ramus intermedius using a 2.25 x 20 mm Promus drug-eluting stent.  2. Benign hypertension.  3. Hyperlipidemia.  4. Peripheral neuropathy.  5. Status post ICD generator change,  January 2014.     ASSESSMENT:   Diagnosis Plan   1. Coronary artery disease involving native coronary artery of native heart without angina pectoris     2. Benign essential hypertension     3. Mixed hyperlipidemia       Device interrogation:  Medtronic dual-chamber ICD  96 %Atrial pacing, sensed P-wave 2.8 mV, capture threshold 0.5 V at 0.4 ms, impedance 456 ohms  Less than 1 % RV pacing, sensed R-wave 6.9 mV, capture threshold 1 mV at 0.4 ms, impedance 399 ohms  No arrhythmia  Estimated battery life 9.2 years  Not dependent      PLAN:  Coronary artery disease: Stable and asymptomatic. continue Plavix.  Continue high intensity statin therapy and current afterload reduction    Elina-infarct V. fib status post ICD implantation: Continue beta-blockade.    Hyperlipidemia: Currently well controlled.  Continue current medical  "therapy, Goal LDL <100, ideally less than 70.  Mild hypertriglyceridemia, continue dietary and lifestyle modifications.  LDL is gradually trending upward, monitoring closely, may need to switch over to rosuvastatin or add Zetia.    Hypertension: Goal less than 130/80, currently well controlled, continue current medical therapy.    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    Paroxysmal atrial fibrillation: Chads vas score equal to 7.  Currently enrolled in [place], study drug is either aspirin or Eliquis.      Subjective  Report stable functional capacity.  Denies chest pain, palpitations or dyspnea on exertion.  Blood pressures run less than 130/80 mmHg.   No significant orthostasis.  Intermittent mild gait instability.  Denies myalgias on statin therapy.  He is compliant with medical therapy.  Device interrogation was negative for arrhythmia.      PHYSICAL EXAMINATION:  Vitals:    11/10/21 0938   BP: 110/62   BP Location: Left arm   Patient Position: Sitting   Pulse: 96   SpO2: 96%   Weight: 127 kg (281 lb)   Height: 198.1 cm (78\")     General Appearance:    Alert, cooperative, no distress, appears stated age   Head:    Normocephalic, without obvious abnormality, atraumatic   Eyes:    conjunctiva/corneas clear   Nose:   Nares normal, septum midline, mucosa normal, no drainage   Throat:   Lips, teeth and gums normal   Neck:   Supple, symmetrical, trachea midline, no carotid    bruit or JVD   Lungs:     Clear to auscultation bilaterally, respirations unlabored   Chest Wall:    No tenderness or deformity    Heart:    Regular rate and rhythm, S1 and S2 normal, no murmur, rub   or gallop, normal carotid impulse bilaterally without bruit.   Abdomen:     Soft, non-tender   Extremities:   Extremities normal, atraumatic, no cyanosis or edema   Pulses:   2+ and symmetric all extremities   Skin:   Skin color, texture, turgor normal, no rashes or lesions       Diagnostic " Data:  Procedures  Lab Results   Component Value Date    CHLPL 176 10/29/2021    TRIG 256 (H) 10/29/2021    HDL 44 10/29/2021     Lab Results   Component Value Date    GLUCOSE 215 (H) 10/04/2021    BUN 16 10/04/2021    CREATININE 1.28 (H) 10/04/2021     10/04/2021    K 4.2 10/04/2021     10/04/2021    CO2 23.0 10/04/2021     Lab Results   Component Value Date    HGBA1C 6.80 (H) 10/29/2021     Lab Results   Component Value Date    WBC 8.65 10/04/2021    HGB 15.4 10/04/2021    HCT 46.0 10/04/2021     10/04/2021       Allergies  Allergies   Allergen Reactions   • Biaxin [Clarithromycin] Nausea Only   • Clindamycin/Lincomycin Other (See Comments)     Caused incontinence   • Levaquin [Levofloxacin] Nausea Only   • Nitroglycerin Other (See Comments)     hypotension   • Penicillins Rash       Current Medications    Current Outpatient Medications:   •  Acetaminophen 500 MG coapsule, Take 1,000 mg by mouth As Needed for Mild Pain ., Disp: , Rfl:   •  apixaban (ELIQUIS) 5 MG tablet tablet, Take 5 mg by mouth 2 (Two) Times a Day. Currently in a trial and it is eliquis or placebo.  Patient is not sure ., Disp: , Rfl:   •  aspirin 81 MG EC tablet, Take 81 mg by mouth Daily. Pt in trial - may be a placebo, Disp: , Rfl:   •  atorvastatin (LIPITOR) 40 MG tablet, Take 1 tablet by mouth Daily., Disp: 90 tablet, Rfl: 1  •  cetirizine (zyrTEC) 10 MG tablet, Take 10 mg by mouth Every Night., Disp: , Rfl:   •  clopidogrel (PLAVIX) 75 MG tablet, Take 1 tablet by mouth Daily., Disp: 90 tablet, Rfl: 3  •  Cyanocobalamin (VITAMIN B-12 PO), Take 100 mcg by mouth Daily., Disp: , Rfl:   •  hydroCHLOROthiazide (HYDRODIURIL) 25 MG tablet, TAKE 1/2 TABLET BY MOUTH DAILY (Patient taking differently: Take 12.5 mg by mouth Daily.), Disp: 45 tablet, Rfl: 1  •  ketoconazole (NIZORAL) 2 % cream, As Needed., Disp: , Rfl:   •  lisinopril (PRINIVIL,ZESTRIL) 10 MG tablet, Take 1 tablet by mouth Every Morning., Disp: 90 tablet, Rfl: 1  •   Lumigan 0.01 % ophthalmic drops, Administer 1 drop to both eyes Every Night., Disp: , Rfl:   •  metoprolol tartrate (LOPRESSOR) 50 MG tablet, Take 1 tablet by mouth Every 12 (Twelve) Hours., Disp: 180 tablet, Rfl: 1  •  Multiple Vitamins-Minerals (MULTIVITAMIN ADULT PO), Take 1 tablet by mouth Daily., Disp: , Rfl:           ROS  Review of Systems   Cardiovascular: Negative for chest pain, dyspnea on exertion, irregular heartbeat and palpitations.   Respiratory: Negative for shortness of breath and sputum production.          SOCIAL HX  Social History     Socioeconomic History   • Marital status:    Tobacco Use   • Smoking status: Never Smoker   • Smokeless tobacco: Never Used   • Tobacco comment: no passive smoke exposure   Vaping Use   • Vaping Use: Never used   Substance and Sexual Activity   • Alcohol use: Yes     Comment: occassionally  1 per month    • Drug use: No   • Sexual activity: Defer     Partners: Female       FAMILY HX  Family History   Problem Relation Age of Onset   • Hepatitis Mother    • Dementia Father    • Stroke Sister    • Hypertension Brother    • Heart attack Paternal Grandfather 70             Abbe Mendieta III, MD, FACC

## 2021-12-16 ENCOUNTER — TELEPHONE (OUTPATIENT)
Dept: CARDIOLOGY | Facility: CLINIC | Age: 76
End: 2021-12-16

## 2021-12-17 RX ORDER — ATORVASTATIN CALCIUM 40 MG/1
40 TABLET, FILM COATED ORAL DAILY
Qty: 90 TABLET | Refills: 0 | Status: SHIPPED | OUTPATIENT
Start: 2021-12-17 | End: 2022-03-21 | Stop reason: SDUPTHER

## 2021-12-27 RX ORDER — METOPROLOL TARTRATE 50 MG/1
TABLET, FILM COATED ORAL
Qty: 180 TABLET | Refills: 1 | Status: SHIPPED | OUTPATIENT
Start: 2021-12-27 | End: 2022-06-27

## 2021-12-27 RX ORDER — CLOPIDOGREL BISULFATE 75 MG/1
75 TABLET ORAL DAILY
Qty: 90 TABLET | Refills: 3 | Status: SHIPPED | OUTPATIENT
Start: 2021-12-27 | End: 2022-10-03

## 2022-01-03 ENCOUNTER — PRE-ADMISSION TESTING (OUTPATIENT)
Dept: PREADMISSION TESTING | Facility: HOSPITAL | Age: 77
End: 2022-01-03

## 2022-01-03 LAB
ALBUMIN SERPL-MCNC: 4.4 G/DL (ref 3.5–5.2)
ALBUMIN/GLOB SERPL: 1.9 G/DL
ALP SERPL-CCNC: 73 U/L (ref 39–117)
ALT SERPL W P-5'-P-CCNC: 30 U/L (ref 1–41)
ANION GAP SERPL CALCULATED.3IONS-SCNC: 10 MMOL/L (ref 5–15)
AST SERPL-CCNC: 28 U/L (ref 1–40)
BILIRUB SERPL-MCNC: 0.6 MG/DL (ref 0–1.2)
BUN SERPL-MCNC: 17 MG/DL (ref 8–23)
BUN/CREAT SERPL: 13.9 (ref 7–25)
CALCIUM SPEC-SCNC: 9.2 MG/DL (ref 8.6–10.5)
CHLORIDE SERPL-SCNC: 103 MMOL/L (ref 98–107)
CO2 SERPL-SCNC: 27 MMOL/L (ref 22–29)
CREAT SERPL-MCNC: 1.22 MG/DL (ref 0.76–1.27)
DEPRECATED RDW RBC AUTO: 44.9 FL (ref 37–54)
ERYTHROCYTE [DISTWIDTH] IN BLOOD BY AUTOMATED COUNT: 12.7 % (ref 12.3–15.4)
GFR SERPL CREATININE-BSD FRML MDRD: 57 ML/MIN/1.73
GLOBULIN UR ELPH-MCNC: 2.3 GM/DL
GLUCOSE SERPL-MCNC: 192 MG/DL (ref 65–99)
HCT VFR BLD AUTO: 44.9 % (ref 37.5–51)
HGB BLD-MCNC: 14.9 G/DL (ref 13–17.7)
LDH SERPL-CCNC: 194 U/L (ref 135–225)
MCH RBC QN AUTO: 32.2 PG (ref 26.6–33)
MCHC RBC AUTO-ENTMCNC: 33.2 G/DL (ref 31.5–35.7)
MCV RBC AUTO: 97 FL (ref 79–97)
PLATELET # BLD AUTO: 239 10*3/MM3 (ref 140–450)
PMV BLD AUTO: 10 FL (ref 6–12)
POTASSIUM SERPL-SCNC: 4.2 MMOL/L (ref 3.5–5.2)
PROT SERPL-MCNC: 6.7 G/DL (ref 6–8.5)
QT INTERVAL: 450 MS
QTC INTERVAL: 489 MS
RBC # BLD AUTO: 4.63 10*6/MM3 (ref 4.14–5.8)
SARS-COV-2 RNA PNL SPEC NAA+PROBE: NOT DETECTED
SODIUM SERPL-SCNC: 140 MMOL/L (ref 136–145)
WBC NRBC COR # BLD: 9.49 10*3/MM3 (ref 3.4–10.8)

## 2022-01-03 PROCEDURE — U0004 COV-19 TEST NON-CDC HGH THRU: HCPCS

## 2022-01-03 PROCEDURE — 80053 COMPREHEN METABOLIC PANEL: CPT

## 2022-01-03 PROCEDURE — 93005 ELECTROCARDIOGRAM TRACING: CPT

## 2022-01-03 PROCEDURE — 36415 COLL VENOUS BLD VENIPUNCTURE: CPT

## 2022-01-03 PROCEDURE — 93010 ELECTROCARDIOGRAM REPORT: CPT | Performed by: INTERNAL MEDICINE

## 2022-01-03 PROCEDURE — C9803 HOPD COVID-19 SPEC COLLECT: HCPCS

## 2022-01-03 PROCEDURE — 83615 LACTATE (LD) (LDH) ENZYME: CPT

## 2022-01-03 PROCEDURE — 85027 COMPLETE CBC AUTOMATED: CPT

## 2022-01-03 NOTE — PAT
The following information and instructions were given:    Nothing to eat or drink after midnight except sips of water with routine prescribed medication (except blood thinner, certain blood pressure medications, diabetes, or weight reducing medication) unless otherwise instructed by your physician.  Do not eat, drink, smoke or chew gum after midnight the night before surgery. This also includes no mints.    EXCEPTION: ERAS patients Patient instructed to drink 20 ounces (or until full) of Gatorade and it needs to be completed 1 hour before given arrival time on the day of surgery. (NO RED Gatorade)    Patient verbalized understanding.      DO NOT shave for two days before your procedure.  Do not wear makeup.      DO NOT wear fingernail polish (gel/regular) and/or acrylic/artificial nails on the day of surgery.   If a patient had recent manicure and would rather not remove polish or artificial nails, then the minimum requirement is that the polish/artificial nails must be removed from the middle finger on each hand.      If patient was having surgery on an upper extremity, then the patient was instructed that fingernail polish/artificial fingernails must be removed for surgery.  NO EXCEPTIONS.      If patient was having surgery on a lower extremity, then the patient was instructed that toenail polish on both extremities must be removed for surgery.  NO EXCEPTIONS.    Remove all jewelry (advised to go to jeweler if unable to remove).  Jewelry especially rings can no longer be taped for surgery.    Leave anything you consider valuable at home.      Bring the following with you (if applicable)   -picture ID and insurance cards   -Co-pay/deductible required by insurance   -Medications in the original bottles (not a list) including all over-the-counter meds     Education booklet, brochure, and/or given to patient.    Patient must have a  for transportation home after procedure.  It must be an   adult that will take  responsibility for care for 24 hours after surgery.    EKG, cardiac clearance and PM interrogation in chart. All faxed to [facility].

## 2022-01-05 ENCOUNTER — LAB REQUISITION (OUTPATIENT)
Dept: LAB | Facility: HOSPITAL | Age: 77
End: 2022-01-05

## 2022-01-05 DIAGNOSIS — C43.59 MALIGNANT MELANOMA OF OTHER PART OF TRUNK: ICD-10-CM

## 2022-01-05 PROCEDURE — 88305 TISSUE EXAM BY PATHOLOGIST: CPT | Performed by: SURGERY

## 2022-01-05 PROCEDURE — 88341 IMHCHEM/IMCYTCHM EA ADD ANTB: CPT | Performed by: SURGERY

## 2022-01-05 PROCEDURE — 88342 IMHCHEM/IMCYTCHM 1ST ANTB: CPT | Performed by: SURGERY

## 2022-02-21 LAB
CYTO UR: NORMAL
LAB AP CASE REPORT: NORMAL
LAB AP CLINICAL INFORMATION: NORMAL
PATH REPORT.ADDENDUM SPEC: NORMAL
PATH REPORT.FINAL DX SPEC: NORMAL
PATH REPORT.GROSS SPEC: NORMAL

## 2022-03-04 ENCOUNTER — PRE-ADMISSION TESTING (OUTPATIENT)
Dept: PREADMISSION TESTING | Facility: HOSPITAL | Age: 77
End: 2022-03-04

## 2022-03-04 VITALS — BODY MASS INDEX: 32.31 KG/M2 | HEIGHT: 78 IN | WEIGHT: 279.21 LBS

## 2022-03-04 LAB
ALBUMIN SERPL-MCNC: 4.4 G/DL (ref 3.5–5.2)
ALBUMIN/GLOB SERPL: 1.8 G/DL
ALP SERPL-CCNC: 78 U/L (ref 39–117)
ALT SERPL W P-5'-P-CCNC: 18 U/L (ref 1–41)
ANION GAP SERPL CALCULATED.3IONS-SCNC: 10 MMOL/L (ref 5–15)
AST SERPL-CCNC: 21 U/L (ref 1–40)
BILIRUB SERPL-MCNC: 0.5 MG/DL (ref 0–1.2)
BUN SERPL-MCNC: 19 MG/DL (ref 8–23)
BUN/CREAT SERPL: 16.1 (ref 7–25)
CALCIUM SPEC-SCNC: 9.7 MG/DL (ref 8.6–10.5)
CHLORIDE SERPL-SCNC: 106 MMOL/L (ref 98–107)
CO2 SERPL-SCNC: 26 MMOL/L (ref 22–29)
CREAT SERPL-MCNC: 1.18 MG/DL (ref 0.76–1.27)
DEPRECATED RDW RBC AUTO: 44.5 FL (ref 37–54)
EGFRCR SERPLBLD CKD-EPI 2021: 63.2 ML/MIN/1.73
ERYTHROCYTE [DISTWIDTH] IN BLOOD BY AUTOMATED COUNT: 12.9 % (ref 12.3–15.4)
GLOBULIN UR ELPH-MCNC: 2.5 GM/DL
GLUCOSE SERPL-MCNC: 190 MG/DL (ref 65–99)
HCT VFR BLD AUTO: 45 % (ref 37.5–51)
HGB BLD-MCNC: 15.4 G/DL (ref 13–17.7)
INR PPP: 1.09 (ref 0.85–1.16)
MCH RBC QN AUTO: 32.3 PG (ref 26.6–33)
MCHC RBC AUTO-ENTMCNC: 34.2 G/DL (ref 31.5–35.7)
MCV RBC AUTO: 94.3 FL (ref 79–97)
PLATELET # BLD AUTO: 276 10*3/MM3 (ref 140–450)
PMV BLD AUTO: 9.8 FL (ref 6–12)
POTASSIUM SERPL-SCNC: 4.3 MMOL/L (ref 3.5–5.2)
PROT SERPL-MCNC: 6.9 G/DL (ref 6–8.5)
PROTHROMBIN TIME: 13.7 SECONDS (ref 11.4–14.4)
RBC # BLD AUTO: 4.77 10*6/MM3 (ref 4.14–5.8)
SARS-COV-2 RNA PNL SPEC NAA+PROBE: NOT DETECTED
SODIUM SERPL-SCNC: 142 MMOL/L (ref 136–145)
WBC NRBC COR # BLD: 9.39 10*3/MM3 (ref 3.4–10.8)

## 2022-03-04 PROCEDURE — 80053 COMPREHEN METABOLIC PANEL: CPT

## 2022-03-04 PROCEDURE — 85610 PROTHROMBIN TIME: CPT

## 2022-03-04 PROCEDURE — C9803 HOPD COVID-19 SPEC COLLECT: HCPCS

## 2022-03-04 PROCEDURE — 36415 COLL VENOUS BLD VENIPUNCTURE: CPT

## 2022-03-04 PROCEDURE — 85027 COMPLETE CBC AUTOMATED: CPT

## 2022-03-04 PROCEDURE — U0004 COV-19 TEST NON-CDC HGH THRU: HCPCS

## 2022-03-04 RX ORDER — FLUTICASONE PROPIONATE 50 MCG
2 SPRAY, SUSPENSION (ML) NASAL DAILY
COMMUNITY
End: 2022-11-11

## 2022-03-04 NOTE — PAT
Patient to apply Chlorhexadine wipes  to surgical area (as instructed) the night before procedure and the AM of procedure. Wipes provided.    An arrival time for procedure was not given during PAT visit. If patient had any questions or concerns about their arrival time, they were instructed to contact their surgeon/physician.  Additionally, if the patient referred to an arrival time that was acquired from their my chart account, patient was encouraged to verify that time with their surgeon/physician.  NO arrival times given in Pre Admission Testing Department.    COVID today in PAT    EKG 1/3/22    Cardiac clearance and eliquis statement on chart  ICD interrogation form 12/2021 on chart.

## 2022-03-06 ENCOUNTER — ANESTHESIA EVENT (OUTPATIENT)
Dept: PERIOP | Facility: HOSPITAL | Age: 77
End: 2022-03-06

## 2022-03-06 RX ORDER — FAMOTIDINE 10 MG/ML
20 INJECTION, SOLUTION INTRAVENOUS ONCE
Status: CANCELLED | OUTPATIENT
Start: 2022-03-06 | End: 2022-03-06

## 2022-03-07 ENCOUNTER — HOSPITAL ENCOUNTER (OUTPATIENT)
Facility: HOSPITAL | Age: 77
Setting detail: HOSPITAL OUTPATIENT SURGERY
Discharge: HOME | End: 2022-03-07
Attending: SURGERY | Admitting: SURGERY

## 2022-03-07 ENCOUNTER — ANESTHESIA (OUTPATIENT)
Dept: PERIOP | Facility: HOSPITAL | Age: 77
End: 2022-03-07

## 2022-03-07 VITALS
OXYGEN SATURATION: 94 % | DIASTOLIC BLOOD PRESSURE: 79 MMHG | HEART RATE: 69 BPM | RESPIRATION RATE: 16 BRPM | SYSTOLIC BLOOD PRESSURE: 110 MMHG | TEMPERATURE: 97.2 F

## 2022-03-07 DIAGNOSIS — D03.9 MELANOMA IN SITU: ICD-10-CM

## 2022-03-07 DIAGNOSIS — C43.8 MALIGNANT MELANOMA OF OVERLAPPING SITES: Primary | ICD-10-CM

## 2022-03-07 LAB — GLUCOSE BLDC GLUCOMTR-MCNC: 142 MG/DL (ref 70–130)

## 2022-03-07 PROCEDURE — 82962 GLUCOSE BLOOD TEST: CPT

## 2022-03-07 PROCEDURE — 25010000002 PROPOFOL 10 MG/ML EMULSION: Performed by: NURSE ANESTHETIST, CERTIFIED REGISTERED

## 2022-03-07 PROCEDURE — 88305 TISSUE EXAM BY PATHOLOGIST: CPT | Performed by: SURGERY

## 2022-03-07 PROCEDURE — 25010000002 CEFAZOLIN IN DEXTROSE 2-4 GM/100ML-% SOLUTION: Performed by: SURGERY

## 2022-03-07 RX ORDER — LABETALOL HYDROCHLORIDE 5 MG/ML
5 INJECTION, SOLUTION INTRAVENOUS
Status: DISCONTINUED | OUTPATIENT
Start: 2022-03-07 | End: 2022-03-07 | Stop reason: HOSPADM

## 2022-03-07 RX ORDER — PROMETHAZINE HYDROCHLORIDE 25 MG/1
25 SUPPOSITORY RECTAL ONCE AS NEEDED
Status: DISCONTINUED | OUTPATIENT
Start: 2022-03-07 | End: 2022-03-07 | Stop reason: HOSPADM

## 2022-03-07 RX ORDER — PROMETHAZINE HYDROCHLORIDE 25 MG/1
25 TABLET ORAL ONCE AS NEEDED
Status: DISCONTINUED | OUTPATIENT
Start: 2022-03-07 | End: 2022-03-07 | Stop reason: HOSPADM

## 2022-03-07 RX ORDER — DROPERIDOL 2.5 MG/ML
0.62 INJECTION, SOLUTION INTRAMUSCULAR; INTRAVENOUS AS NEEDED
Status: DISCONTINUED | OUTPATIENT
Start: 2022-03-07 | End: 2022-03-07 | Stop reason: HOSPADM

## 2022-03-07 RX ORDER — FAMOTIDINE 20 MG/1
20 TABLET, FILM COATED ORAL ONCE
Status: COMPLETED | OUTPATIENT
Start: 2022-03-07 | End: 2022-03-07

## 2022-03-07 RX ORDER — IPRATROPIUM BROMIDE AND ALBUTEROL SULFATE 2.5; .5 MG/3ML; MG/3ML
3 SOLUTION RESPIRATORY (INHALATION) ONCE AS NEEDED
Status: DISCONTINUED | OUTPATIENT
Start: 2022-03-07 | End: 2022-03-07 | Stop reason: HOSPADM

## 2022-03-07 RX ORDER — HYDRALAZINE HYDROCHLORIDE 20 MG/ML
5 INJECTION INTRAMUSCULAR; INTRAVENOUS
Status: DISCONTINUED | OUTPATIENT
Start: 2022-03-07 | End: 2022-03-07 | Stop reason: HOSPADM

## 2022-03-07 RX ORDER — MIDAZOLAM HYDROCHLORIDE 1 MG/ML
0.5 INJECTION INTRAMUSCULAR; INTRAVENOUS
Status: DISCONTINUED | OUTPATIENT
Start: 2022-03-07 | End: 2022-03-07 | Stop reason: HOSPADM

## 2022-03-07 RX ORDER — DROPERIDOL 2.5 MG/ML
0.62 INJECTION, SOLUTION INTRAMUSCULAR; INTRAVENOUS ONCE AS NEEDED
Status: DISCONTINUED | OUTPATIENT
Start: 2022-03-07 | End: 2022-03-07 | Stop reason: HOSPADM

## 2022-03-07 RX ORDER — SODIUM CHLORIDE 0.9 % (FLUSH) 0.9 %
3 SYRINGE (ML) INJECTION EVERY 12 HOURS SCHEDULED
Status: DISCONTINUED | OUTPATIENT
Start: 2022-03-07 | End: 2022-03-07 | Stop reason: HOSPADM

## 2022-03-07 RX ORDER — HYDROCODONE BITARTRATE AND ACETAMINOPHEN 5; 325 MG/1; MG/1
1 TABLET ORAL ONCE AS NEEDED
Status: DISCONTINUED | OUTPATIENT
Start: 2022-03-07 | End: 2022-03-07 | Stop reason: HOSPADM

## 2022-03-07 RX ORDER — MEPERIDINE HYDROCHLORIDE 25 MG/ML
12.5 INJECTION INTRAMUSCULAR; INTRAVENOUS; SUBCUTANEOUS
Status: DISCONTINUED | OUTPATIENT
Start: 2022-03-07 | End: 2022-03-07 | Stop reason: HOSPADM

## 2022-03-07 RX ORDER — SODIUM CHLORIDE 0.9 % (FLUSH) 0.9 %
3-10 SYRINGE (ML) INJECTION AS NEEDED
Status: DISCONTINUED | OUTPATIENT
Start: 2022-03-07 | End: 2022-03-07 | Stop reason: HOSPADM

## 2022-03-07 RX ORDER — LIDOCAINE HYDROCHLORIDE 10 MG/ML
INJECTION, SOLUTION EPIDURAL; INFILTRATION; INTRACAUDAL; PERINEURAL AS NEEDED
Status: DISCONTINUED | OUTPATIENT
Start: 2022-03-07 | End: 2022-03-07 | Stop reason: SURG

## 2022-03-07 RX ORDER — HYDROMORPHONE HYDROCHLORIDE 1 MG/ML
0.5 INJECTION, SOLUTION INTRAMUSCULAR; INTRAVENOUS; SUBCUTANEOUS
Status: DISCONTINUED | OUTPATIENT
Start: 2022-03-07 | End: 2022-03-07 | Stop reason: HOSPADM

## 2022-03-07 RX ORDER — ONDANSETRON 2 MG/ML
4 INJECTION INTRAMUSCULAR; INTRAVENOUS ONCE AS NEEDED
Status: DISCONTINUED | OUTPATIENT
Start: 2022-03-07 | End: 2022-03-07 | Stop reason: HOSPADM

## 2022-03-07 RX ORDER — TRAMADOL HYDROCHLORIDE 50 MG/1
50 TABLET ORAL EVERY 8 HOURS PRN
Qty: 5 TABLET | Refills: 0 | Status: SHIPPED | OUTPATIENT
Start: 2022-03-07 | End: 2022-05-04

## 2022-03-07 RX ORDER — FENTANYL CITRATE 50 UG/ML
50 INJECTION, SOLUTION INTRAMUSCULAR; INTRAVENOUS
Status: DISCONTINUED | OUTPATIENT
Start: 2022-03-07 | End: 2022-03-07 | Stop reason: HOSPADM

## 2022-03-07 RX ORDER — MAGNESIUM HYDROXIDE 1200 MG/15ML
LIQUID ORAL AS NEEDED
Status: DISCONTINUED | OUTPATIENT
Start: 2022-03-07 | End: 2022-03-07 | Stop reason: HOSPADM

## 2022-03-07 RX ORDER — NALOXONE HCL 0.4 MG/ML
0.4 VIAL (ML) INJECTION AS NEEDED
Status: DISCONTINUED | OUTPATIENT
Start: 2022-03-07 | End: 2022-03-07 | Stop reason: HOSPADM

## 2022-03-07 RX ORDER — SODIUM CHLORIDE 0.9 % (FLUSH) 0.9 %
10 SYRINGE (ML) INJECTION EVERY 12 HOURS SCHEDULED
Status: DISCONTINUED | OUTPATIENT
Start: 2022-03-07 | End: 2022-03-07 | Stop reason: HOSPADM

## 2022-03-07 RX ORDER — SODIUM CHLORIDE, SODIUM LACTATE, POTASSIUM CHLORIDE, CALCIUM CHLORIDE 600; 310; 30; 20 MG/100ML; MG/100ML; MG/100ML; MG/100ML
9 INJECTION, SOLUTION INTRAVENOUS CONTINUOUS
Status: DISCONTINUED | OUTPATIENT
Start: 2022-03-07 | End: 2022-03-07 | Stop reason: HOSPADM

## 2022-03-07 RX ORDER — LIDOCAINE HYDROCHLORIDE 10 MG/ML
INJECTION, SOLUTION EPIDURAL; INFILTRATION; INTRACAUDAL; PERINEURAL AS NEEDED
Status: DISCONTINUED | OUTPATIENT
Start: 2022-03-07 | End: 2022-03-07 | Stop reason: HOSPADM

## 2022-03-07 RX ORDER — PROPOFOL 10 MG/ML
VIAL (ML) INTRAVENOUS AS NEEDED
Status: DISCONTINUED | OUTPATIENT
Start: 2022-03-07 | End: 2022-03-07 | Stop reason: SURG

## 2022-03-07 RX ORDER — SODIUM CHLORIDE 0.9 % (FLUSH) 0.9 %
10 SYRINGE (ML) INJECTION AS NEEDED
Status: DISCONTINUED | OUTPATIENT
Start: 2022-03-07 | End: 2022-03-07 | Stop reason: HOSPADM

## 2022-03-07 RX ORDER — CEFAZOLIN SODIUM 2 G/100ML
2 INJECTION, SOLUTION INTRAVENOUS ONCE
Status: COMPLETED | OUTPATIENT
Start: 2022-03-07 | End: 2022-03-07

## 2022-03-07 RX ORDER — LIDOCAINE HYDROCHLORIDE 10 MG/ML
0.5 INJECTION, SOLUTION EPIDURAL; INFILTRATION; INTRACAUDAL; PERINEURAL ONCE AS NEEDED
Status: COMPLETED | OUTPATIENT
Start: 2022-03-07 | End: 2022-03-07

## 2022-03-07 RX ADMIN — FAMOTIDINE 20 MG: 20 TABLET, FILM COATED ORAL at 08:41

## 2022-03-07 RX ADMIN — CEFAZOLIN SODIUM 2 G: 2 INJECTION, SOLUTION INTRAVENOUS at 11:02

## 2022-03-07 RX ADMIN — LIDOCAINE HYDROCHLORIDE 0.2 ML: 10 INJECTION, SOLUTION EPIDURAL; INFILTRATION; INTRACAUDAL; PERINEURAL at 08:35

## 2022-03-07 RX ADMIN — PROPOFOL 100 MCG/KG/MIN: 10 INJECTION, EMULSION INTRAVENOUS at 11:05

## 2022-03-07 RX ADMIN — SODIUM CHLORIDE, POTASSIUM CHLORIDE, SODIUM LACTATE AND CALCIUM CHLORIDE 9 ML/HR: 600; 310; 30; 20 INJECTION, SOLUTION INTRAVENOUS at 08:35

## 2022-03-07 RX ADMIN — LIDOCAINE HYDROCHLORIDE 50 MG: 10 INJECTION, SOLUTION EPIDURAL; INFILTRATION; INTRACAUDAL; PERINEURAL at 11:05

## 2022-03-07 RX ADMIN — PROPOFOL 50 MG: 10 INJECTION, EMULSION INTRAVENOUS at 11:10

## 2022-03-07 NOTE — H&P
Pre-Op H&P  Abbe Palacios  [id number]  1945      Chief complaint: Melanoma      Subjective:  Patient is a 76 y.o.male presents for scheduled surgery by Dr. Phillips. He anticipates a RE-EXCISION MELANOMA IN SITU LEFT SUPRACLAVICULAR FOSSA today. He underwent local excision of left supraclavicular fossa on 1/22/22; which he recovered well. One of the margins was small and was felt safer to expand the excision.       Review of Systems:  Constitutional-- No fever, chills or sweats. No fatigue.  CV-- No chest pain, palpitation or syncope. +HTN, HLD, CAD, CHF, ICD; last dose Eliquis 3/4/22, Plavix 3/6/22. +cardiac clearance  Resp-- No SOB, cough, hemoptysis  Skin--No rashes or lesions      Allergies:   Allergies   Allergen Reactions   • Biaxin [Clarithromycin] Nausea Only   • Clindamycin/Lincomycin Other (See Comments)     Caused incontinence   • Levaquin [Levofloxacin] Nausea Only   • Nitroglycerin Other (See Comments)     hypotension   • Penicillins Rash         Home Meds:  Medications Prior to Admission   Medication Sig Dispense Refill Last Dose   • Acetaminophen 500 MG coapsule Take 1,000 mg by mouth As Needed for Mild Pain .   3/7/2022 at 0600   • aspirin 81 MG EC tablet Take 81 mg by mouth Daily. Pt in trial - may be a placebo   3/6/2022 at 1800   • atorvastatin (LIPITOR) 40 MG tablet Take 1 tablet by mouth Daily. (Patient taking differently: Take 40 mg by mouth Every Night.) 90 tablet 0 3/6/2022 at 1800   • cetirizine (zyrTEC) 10 MG tablet Take 10 mg by mouth Every Night.   3/6/2022 at 2300   • clopidogrel (PLAVIX) 75 MG tablet Take 1 tablet by mouth Daily. (Patient taking differently: Take 75 mg by mouth Daily. Will not stop for surgery per office) 90 tablet 3 3/6/2022 at 1800   • hydroCHLOROthiazide (HYDRODIURIL) 25 MG tablet TAKE 1/2 TABLET BY MOUTH DAILY (Patient taking differently: Take 12.5 mg by mouth Daily.) 45 tablet 1 3/6/2022 at 0900   • Lumigan 0.01 % ophthalmic drops Administer 1 drop to both  eyes Every Night.   3/6/2022 at 2300   • metoprolol tartrate (LOPRESSOR) 50 MG tablet TAKE 1 TABLET BY MOUTH EVERY 12 HOURS (Patient taking differently: Take 50 mg by mouth 2 (Two) Times a Day.) 180 tablet 1 3/6/2022 at 1800   • apixaban (ELIQUIS) 5 MG tablet tablet Take 5 mg by mouth 2 (Two) Times a Day. Currently in a trial and it is eliquis or placebo.  Patient is not sure . Patient will hold for two days before surgery per orders from Dr Ramirez   3/4/2022   • Cyanocobalamin (VITAMIN B-12 PO) Take 100 mcg by mouth Daily.   3/5/2022   • fluticasone (FLONASE) 50 MCG/ACT nasal spray 2 sprays into the nostril(s) as directed by provider Daily.   3/5/2022   • lisinopril (PRINIVIL,ZESTRIL) 10 MG tablet Take 1 tablet by mouth Every Morning. 90 tablet 1 3/5/2022   • Multiple Vitamins-Minerals (MULTIVITAMIN ADULT PO) Take 1 tablet by mouth Daily.   3/4/2022         PMH:   Past Medical History:   Diagnosis Date   • Benign hypertension    • Cancer (HCC) Basal cell    skin    • Cataracts, both eyes     needs surgery    • Chronic systolic (congestive) heart failure (HCC)    • Coronary artery disease 2007, 2013    MI x2; 6 stents    • Diabetic nephropathy associated with type 2 diabetes mellitus (HCC) 10/27/2019   • Diabetic peripheral angiopathy (HCC)    • Diabetic polyneuropathy associated with type 2 diabetes mellitus (HCC)    • Glaucoma    • History of COVID-19 01/2020    no hospitalizaiton    • Hyperlipidemia    • Implantable cardioverter-defibrillator (ICD) generator end of life 01/01/2014    Status post ICD generator change   • Melanoma (HCC)    • Myocardial infarction (HCC) 2013    NSTEMI   • Osteoarthritis of left hip    • Peripheral neuropathy    • Stroke (HCC)     PONTINE STROKE   • Ventricular tachycardia (HCC)    • Wears eyeglasses      PSH:    Past Surgical History:   Procedure Laterality Date   • APPENDECTOMY  1979   • CARDIAC CATHETERIZATION     • CARDIAC DEFIBRILLATOR PLACEMENT  2007   • CARDIAC  ELECTROPHYSIOLOGY PROCEDURE N/A 10/6/2021    Procedure: ICD DC generator change/medtronic 2-4 weeks/Hold eliquis 3 days per Dr Dickens;  Surgeon: Alexis Dickens MD;  Location: [facility] INVASIVE LOCATION;  Service: Cardiology;  Laterality: N/A;   • CARDIAC PACEMAKER PLACEMENT  2007    dual chamber   • COLONOSCOPY  2013   • CORONARY STENT PLACEMENT  2007    elective stents to LAD, CIRC   • CORONARY STENT PLACEMENT  2007    emergent L heart cath with stents RCA    • CORONARY STENT PLACEMENT      2013?  stents to diagonal and ramus   • HIP SURGERY Left 01/16/2018    total left hip arthroplasty, anterior approach   • JOINT REPLACEMENT Left     hip   • SQUAMOUS CELL CARCINOMA EXCISION     • TONSILLECTOMY     • WISDOM TOOTH EXTRACTION         Immunization History:  Influenza: 2021  Pneumococcal: UTD  Tetanus: UTD  Covid x3: 2021    Social History:   Tobacco:   Social History     Tobacco Use   Smoking Status Never Smoker   Smokeless Tobacco Never Used   Tobacco Comment    no passive smoke exposure      Alcohol:     Social History     Substance and Sexual Activity   Alcohol Use Not Currently    Comment: occassionally  1 per month          Physical Exam:/87 (BP Location: Right arm, Patient Position: Lying)   Pulse 68   Temp 97.3 °F (36.3 °C) (Temporal)   Resp 18   SpO2 94%       General Appearance:    Alert, cooperative, no distress, appears stated age   Head:    Normocephalic, without obvious abnormality, atraumatic   Lungs:     Clear to auscultation bilaterally, respirations unlabored    Heart:   Regular rate and rhythm, S1 and S2 normal    Abdomen:    Soft without tenderness   Extremities:   Extremities normal, atraumatic, no cyanosis or edema   Skin:   Skin color, texture, turgor normal, no rashes or lesions   Neurologic:   Grossly intact     Results Review:     LABS:  Lab Results   Component Value Date    WBC 9.39 03/04/2022    HGB 15.4 03/04/2022    HCT 45.0 03/04/2022    MCV 94.3 03/04/2022      03/04/2022    NEUTROABS 5.38 10/04/2021    GLUCOSE 190 (H) 03/04/2022    BUN 19 03/04/2022    CREATININE 1.18 03/04/2022    EGFRIFNONA 57 (L) 01/03/2022    EGFRIFAFRI 66 04/21/2021     03/04/2022    K 4.3 03/04/2022     03/04/2022    CO2 26.0 03/04/2022    MG 1.9 10/04/2021    CALCIUM 9.7 03/04/2022    ALBUMIN 4.40 03/04/2022    AST 21 03/04/2022    ALT 18 03/04/2022    BILITOT 0.5 03/04/2022     Clinical Information    Malignant melanoma of other part of trunk.   Final Diagnosis   SKIN, LEFT CHEST, EXCISION:               Clinically re-excision of invasive melanoma.               Changes consistent with prior biopsy site.               Residual atypical intraepidermal component/melanoma in situ within 1-2 mm of 3:00 and 9:00 margins.  See CAP protocol.     MELANOMA OF SKIN  SPECIMEN TYPE/PROCEDURE: Excision  LATERALITY:  Left  TUMOR SITE:  Skin of chest  Multiple Primary Sites: Not applicable  MACROSCOPIC SATELLITE NODULE(s) (required for invasive specimens only): Not identified  HISTOLOGIC TYPE: Superficial spreading melanoma  MAXIMUM TUMOR THICKNESS: 0.3 mm  ULCERATION: Not identified  MARGINS:  All margins negative for invasive and in situ melanoma  MITOTIC INDEX: None identified  MICROSATELLITOSIS: Not identified  LYMPH-VASCULAR INVASION: Not identified  NEUROTROPISM: Not identified  LYMPH NODES:  Not applicable  ADDITIONAL PATHOLOGIC FINDINGS: seborrheic keratosis  AJCC PATHOLOGIC STAGE:  (COMPLETED BY PATHOLOGIST, BASED ONLY ON TISSUE FINDINGS, MORE EXTENSIVE DISEASE MAY NOT BE KNOWN TO THE PATHOLOGIST)  pT= pT1a  pN= not assigned       GJK   Electronically signed by Kevin Cassidy MD on 1/12/2022 at 1611   Gross Description    Specimen 1 is received in formalin labeled left chest melanoma and consists of a 3.9 x 2.5 cm ellipse of pink-tan, wrinkled, hairbearing skin excised to a greatest depth of 1.1 cm.  The specimen is oriented with a long stitch at the anterior edge and a short stitch at the  medial tip.  The short stitch is redesignated 12:00 and the long stitch is redesignated 9:00.  The skin surface is remarkable for at least 4 irregular, tan-brown, slightly raised lesions which range from 0.2 x 0.2 x 0.1 cm to 0.9 x 0.7 x 0.2 cm.  The lesions appear grossly discontinuous and are less than 0.1 cm from the nearest 1:00 peripheral skin margin.  Also present is a centrally located, ill-defined area of pallor, 1.1 x 0.7 cm, that is 0.4 cm from the nearest 9:00 peripheral skin margin.  The remaining skin is grossly unremarkable.  The margins are inked as follows: 12-3-6 o'clock = black, 6-9-12 o'clock = blue.  Sectioning from 12-6 o'clock reveals tan-white, fibrous, focally fatty cut surfaces without discrete masses, nodules, or brown discoloration.  The specimen is submitted sequentially and entirely as follows:  1A-en face 12:00 tip; 1B-1C-12:00 half central sections; 1D-1F-6:00 half central sections; 1G-en face 6:00 tip.  CLB       RADIOLOGY:  Imaging Results (Last 72 Hours)     ** No results found for the last 72 hours. **          I reviewed the patient's new clinical results.    Cancer Staging (if applicable)  Cancer Patient: __ yes __no __unknown; If yes, clinical stage T:__ N:__M:__, stage group or __N/A      Impression: Malignant melanoma of trunk       Plan: RE-EXCISION MELANOMA IN SITU LEFT SUPRACLAVICULAR FOSSA      Jessie Porter, APRN   3/7/2022   09:13 EST     I have reviewed the above note, my prior note(s), appropriate imaging, and labs.  I have again discussed the risks and benefits of re-excision left supraclavicular fossa melanoma (V.G.P. 0.3 mm) with the patient.  All of their questions have been answered.  They understand and wish to proceed with surgical intervention. Eliquis held for 2 days pre op.    CBC  Results from last 7 days   Lab Units 03/04/22  1023   WBC 10*3/mm3 9.39   HEMOGLOBIN g/dL 15.4   HEMATOCRIT % 45.0   PLATELETS 10*3/mm3 276       CMP  Results from last 7 days    Lab Units 03/04/22  1023   SODIUM mmol/L 142   POTASSIUM mmol/L 4.3   CHLORIDE mmol/L 106   CO2 mmol/L 26.0   BUN mg/dL 19   CREATININE mg/dL 1.18   CALCIUM mg/dL 9.7   BILIRUBIN mg/dL 0.5   ALK PHOS U/L 78   ALT (SGPT) U/L 18   AST (SGOT) U/L 21   GLUCOSE mg/dL 190*       Coagulation Cascade  INR   Date Value Ref Range Status   03/04/2022 1.09 0.85 - 1.16 Final     Protime   Date Value Ref Range Status   03/04/2022 13.7 11.4 - 14.4 Seconds Final       No components found for: ECHO

## 2022-03-07 NOTE — OP NOTE
General Surgery Operative Note    Abbe Palacios  [id number]  1945    Date of Surgery:  3/7/2022 11:37 EST    Pre-op Diagnosis: Left supraclavicular melanoma, VGP 0.3 mm    Post-op Diagnosis: Left supraclavicular melanoma,VGP 0.3 mm    Procedure: Reexcision left supraclavicular melanoma            5 cm x 2.5 cm wound closure    Surgeon: Sathish Phillips MD    Circulator: Jacinta Cha RN; Lexi Seals RN  Scrub Person: Charmaine Bolden RN; Kayode Robles RN     Anesthesia: Local MAC    Fluids: 600 mL of crystalloid    Estimated Blood Loss: Less than 25 mL    Specimens: Left supraclavicular reexcision melanoma, long stitch   lateral and short stitch anterior                Complications: None apparent    History:   76-year-old gentleman presented to office with a superficial melanoma.  Upon initial excision he was found to have a 0.3 mm invasive portion.  He returns to day to ensure adequate peripheral and deep margins.       The risks and benefits of reexcision melanoma were rehashed.  Our discussion included but was not limited to: bleeding, infection, injury to adjacent viscera (lymphatics, nerves etc.), positive margin requiring reexcision, poor wound healing, need for reintervention, and medical issues from a cardiopulmonary and deep venous thrombosis standpoint.  All questions were answered and they understand and wish to proceed with surgical intervention.    Procedure:      After informed consent, the patient was taken to the operating room and placed in the supine position.  Appropriate antibiotic prophylaxis was given to the patient.  Conscious sedation was induced, the left neck and shoulder were then prepped and draped in the standard sterile fashion. A time out was observed.     I measured just over 5 mm margins circumferentially.  A fusiform incision was created with the scalpel down to the subcutaneous fat.  The specimen was then amputated with the electrocautery Bovie.  The specimen  or was oriented with a long stitch lateral and a short stitch anterior from the left supraclavicular fossa.  Meticulous hemostasis was obtained.  The remaining defect size was 5 cm x 2.5 cm.  This defect was closed in 2 layers of interrupted 3-0 Vicryl.  The skin was reapproximated with a running 3-0 subcuticular Monocryl.   Mastisol, Steri-Strips, Telfa, and Tegaderm were placed on the wound.     All lap and needle counts were reported as correct at the end of the procedure ×2.  The patient was then transferred to the PACU.    Sathish Phillips MD     Date: 3/7/2022  Time: 11:37 EST

## 2022-03-07 NOTE — ANESTHESIA POSTPROCEDURE EVALUATION
Patient: Abbe Palacios    Procedure Summary     Date: 03/07/22 Room / Location:  [facility] OR 04 / [facility] OR    Anesthesia Start: 1058 Anesthesia Stop: 1144    Procedure: RE-EXCISION MELANOMA IN SITU LEFT SUPRACLAVICULAR FOSSA (Left ) Diagnosis:     Surgeons: Sathish Phillips MD Provider: John Fragoso MD    Anesthesia Type: MAC ASA Status: 3          Anesthesia Type: MAC    Vitals  Vitals Value Taken Time   /62 03/07/22 1144   Temp 97.2 °F (36.2 °C) 03/07/22 1144   Pulse 71 03/07/22 1144   Resp 14 03/07/22 1144   SpO2 96 % 03/07/22 1144           Post Anesthesia Care and Evaluation    Patient location during evaluation: PACU  Patient participation: complete - patient participated  Level of consciousness: awake and alert  Pain management: adequate  Airway patency: patent  Anesthetic complications: No anesthetic complications  PONV Status: none  Cardiovascular status: hemodynamically stable and acceptable  Respiratory status: nonlabored ventilation, acceptable and nasal cannula  Hydration status: acceptable

## 2022-03-07 NOTE — ANESTHESIA PREPROCEDURE EVALUATION
Anesthesia Evaluation     Patient summary reviewed and Nursing notes reviewed   NPO Solid Status: > 8 hours  NPO Liquid Status: > 2 hours           Airway   Mallampati: I  TM distance: >3 FB  Neck ROM: full  No difficulty expected  Dental      Pulmonary     breath sounds clear to auscultation  Cardiovascular     ECG reviewed  Rhythm: regular  Rate: normal    (+) pacemaker pacemaker, ICD interrogated 1-3 months ago, hypertension, CAD, cardiac stents more than 12 months ago hyperlipidemia,       Neuro/Psych  GI/Hepatic/Renal/Endo    (+) obesity,   diabetes mellitus type 2,     Musculoskeletal     Abdominal    Substance History      OB/GYN          Other   arthritis,      ROS/Med Hx Other: Atrial-paced rhythm with prolonged AV conduction  Moderate voltage criteria for LVH, may be normal variant  T wave abnormality, consider inferior ischemia  Prolonged QT    Walks 30-45 min qd                  Anesthesia Plan    ASA 3     MAC     intravenous induction     Anesthetic plan, all risks, benefits, and alternatives have been provided, discussed and informed consent has been obtained with: patient.    Plan discussed with CRNA.        CODE STATUS:

## 2022-03-08 LAB
CYTO UR: NORMAL
LAB AP CASE REPORT: NORMAL
LAB AP CLINICAL INFORMATION: NORMAL
PATH REPORT.FINAL DX SPEC: NORMAL
PATH REPORT.GROSS SPEC: NORMAL

## 2022-03-21 RX ORDER — ATORVASTATIN CALCIUM 40 MG/1
40 TABLET, FILM COATED ORAL DAILY
Qty: 90 TABLET | Refills: 0 | Status: SHIPPED | OUTPATIENT
Start: 2022-03-21 | End: 2022-06-20

## 2022-03-26 PROCEDURE — 93295 DEV INTERROG REMOTE 1/2/MLT: CPT | Performed by: INTERNAL MEDICINE

## 2022-03-26 PROCEDURE — 93296 REM INTERROG EVL PM/IDS: CPT | Performed by: INTERNAL MEDICINE

## 2022-03-28 RX ORDER — HYDROCHLOROTHIAZIDE 25 MG/1
TABLET ORAL
Qty: 45 TABLET | Refills: 0 | Status: SHIPPED | OUTPATIENT
Start: 2022-03-28 | End: 2022-06-27

## 2022-04-14 ENCOUNTER — TELEPHONE (OUTPATIENT)
Dept: CARDIOLOGY | Facility: CLINIC | Age: 77
End: 2022-04-14

## 2022-05-04 ENCOUNTER — OFFICE VISIT (OUTPATIENT)
Dept: INTERNAL MEDICINE | Facility: CLINIC | Age: 77
End: 2022-05-04

## 2022-05-04 ENCOUNTER — LAB (OUTPATIENT)
Dept: LAB | Facility: HOSPITAL | Age: 77
End: 2022-05-04

## 2022-05-04 VITALS
HEART RATE: 60 BPM | HEIGHT: 78 IN | BODY MASS INDEX: 31.05 KG/M2 | WEIGHT: 268.4 LBS | DIASTOLIC BLOOD PRESSURE: 70 MMHG | TEMPERATURE: 98.4 F | SYSTOLIC BLOOD PRESSURE: 124 MMHG

## 2022-05-04 DIAGNOSIS — E11.21 DIABETIC NEPHROPATHY ASSOCIATED WITH TYPE 2 DIABETES MELLITUS: Primary | ICD-10-CM

## 2022-05-04 DIAGNOSIS — J30.1 SEASONAL ALLERGIC RHINITIS DUE TO POLLEN: ICD-10-CM

## 2022-05-04 DIAGNOSIS — Z11.59 ENCOUNTER FOR HEPATITIS C SCREENING TEST FOR LOW RISK PATIENT: ICD-10-CM

## 2022-05-04 DIAGNOSIS — N18.31 STAGE 3A CHRONIC KIDNEY DISEASE: ICD-10-CM

## 2022-05-04 DIAGNOSIS — E78.2 MIXED HYPERLIPIDEMIA: ICD-10-CM

## 2022-05-04 DIAGNOSIS — I10 PRIMARY HYPERTENSION: ICD-10-CM

## 2022-05-04 DIAGNOSIS — E11.21 DIABETIC NEPHROPATHY ASSOCIATED WITH TYPE 2 DIABETES MELLITUS: ICD-10-CM

## 2022-05-04 DIAGNOSIS — E03.9 HYPOTHYROIDISM (ACQUIRED): ICD-10-CM

## 2022-05-04 LAB
ALBUMIN SERPL-MCNC: 4.5 G/DL (ref 3.5–5.2)
ALBUMIN/GLOB SERPL: 1.8 G/DL
ALP SERPL-CCNC: 83 U/L (ref 39–117)
ALT SERPL-CCNC: 25 U/L (ref 1–41)
AST SERPL-CCNC: 33 U/L (ref 1–40)
BILIRUB SERPL-MCNC: 0.7 MG/DL (ref 0–1.2)
BUN SERPL-MCNC: 18 MG/DL (ref 8–23)
BUN/CREAT SERPL: 14.9 (ref 7–25)
CALCIUM SERPL-MCNC: 9.8 MG/DL (ref 8.6–10.5)
CHLORIDE SERPL-SCNC: 102 MMOL/L (ref 98–107)
CHOLEST SERPL-MCNC: 152 MG/DL (ref 0–200)
CO2 SERPL-SCNC: 27 MMOL/L (ref 22–29)
CREAT SERPL-MCNC: 1.21 MG/DL (ref 0.76–1.27)
EGFRCR SERPLBLD CKD-EPI 2021: 61.3 ML/MIN/1.73
GLOBULIN SER CALC-MCNC: 2.5 GM/DL
GLUCOSE SERPL-MCNC: 130 MG/DL (ref 65–99)
HBA1C MFR BLD: 7.1 % (ref 4.8–5.6)
HDLC SERPL-MCNC: 35 MG/DL (ref 40–60)
LDLC SERPL CALC-MCNC: 74 MG/DL (ref 0–100)
POTASSIUM SERPL-SCNC: 4.2 MMOL/L (ref 3.5–5.2)
PROT SERPL-MCNC: 7 G/DL (ref 6–8.5)
SODIUM SERPL-SCNC: 140 MMOL/L (ref 136–145)
TRIGL SERPL-MCNC: 260 MG/DL (ref 0–150)
VLDLC SERPL CALC-MCNC: 43 MG/DL (ref 5–40)

## 2022-05-04 PROCEDURE — 99214 OFFICE O/P EST MOD 30 MIN: CPT | Performed by: INTERNAL MEDICINE

## 2022-05-04 NOTE — PROGRESS NOTES
[facility]     Abbe Palacios  1945   [id number]      Patient Care Team:  Xavier Briones MD as PCP - General  Xavier Briones MD as PCP - Family Medicine  Abbe Mendieta III, MD as Cardiologist (Cardiology)    Chief Complaint::   Chief Complaint   Patient presents with   • Diabetes     6 mo. F/u    • Hypertension        HPI    The patient presents for follow-up of his diabetes, hypertension, chronic kidney disease, and hyperlipidemia. He continues to see cardiology for coronary artery disease from his resection operation.    The patient states he had a couple of teeth removed. One of them was a crown that broke off and he knew it was coming. He notes the back molar was bothering him. Dr. Cassidy is his dentist and she told him it is going to have to come out. He states it came to the point where it really bothered him about a week ago. He was scheduled for yesterday and he called Dr. Philipp Guerrier, his oral surgeon. He presented on Friday and underwent removal.    Sinus drainage  He states he continues to have sinus drainage. This spring has been a particularly bad allergy season. He is taking Zyrtec and uses Flonase occasionally. He states it does not seem like it does much when he uses it. It does bother him at night. He often needs to clear his throat.     Joint pain  He has a lot of joint pain. He states he cannot straighten his fingers out and has been attempting to stretch.    Balance  He states when he is walking, his balance is not good. It is not like he feels dizzy. He states he does have discomfort when he is going up steps and he tries to hold on to something.    Chronic Conditions:  Diabetes, hypertension, chronic kidney disease, and hyperlipidemia    Patient Active Problem List   Diagnosis   • Coronary artery disease   • Peripheral neuropathy   • VF (ventricular fibrillation) ([facility])   • Hypertrophy of prostate without urinary obstruction   • Diabetic polyneuropathy associated  with type 2 diabetes mellitus (HCC)   • Obesity   • Mixed hyperlipidemia   • Primary hypertension   • Seasonal allergic rhinitis   • Diabetic nephropathy associated with type 2 diabetes mellitus (HCC)   • Chronic kidney disease, stage III (moderate) (HCC)   • Malignant melanoma of overlapping sites (HCC)        Past Medical History:   Diagnosis Date   • Benign hypertension    • Cancer (HCC) Basal cell    skin    • Cataracts, both eyes     needs surgery    • Chronic systolic (congestive) heart failure (HCC)    • Coronary artery disease 2007, 2013    MI x2; 6 stents    • Diabetic nephropathy associated with type 2 diabetes mellitus (HCC) 10/27/2019   • Diabetic peripheral angiopathy (HCC)    • Diabetic polyneuropathy associated with type 2 diabetes mellitus (HCC)    • Glaucoma    • History of COVID-19 01/2020    no hospitalizaiton    • Hyperlipidemia    • Implantable cardioverter-defibrillator (ICD) generator end of life 01/01/2014    Status post ICD generator change   • Melanoma (HCC)    • Myocardial infarction (HCC) 2013    NSTEMI   • Osteoarthritis of left hip    • Peripheral neuropathy    • Stroke (HCC)     PONTINE STROKE   • Ventricular tachycardia (HCC)    • Wears eyeglasses        Past Surgical History:   Procedure Laterality Date   • APPENDECTOMY  1979   • CARDIAC CATHETERIZATION     • CARDIAC DEFIBRILLATOR PLACEMENT  2007   • CARDIAC ELECTROPHYSIOLOGY PROCEDURE N/A 10/06/2021    Procedure: ICD DC generator change/medtronic 2-4 weeks/Hold eliquis 3 days per Dr Dickens;  Surgeon: Alexis Dickens MD;  Location: [facility] INVASIVE LOCATION;  Service: Cardiology;  Laterality: N/A;   • CARDIAC PACEMAKER PLACEMENT  2007    dual chamber   • COLONOSCOPY  2013   • CORONARY STENT PLACEMENT  2007    elective stents to LAD, CIRC   • CORONARY STENT PLACEMENT  2007    emergent L heart cath with stents RCA    • CORONARY STENT PLACEMENT      2013?  stents to diagonal and ramus   • HEAD/NECK LESION/CYST EXCISION Left 03/07/2022     Procedure: RE-EXCISION MELANOMA IN SITU LEFT SUPRACLAVICULAR FOSSA;  Surgeon: Sathish Phillips MD;  Location: [facility];  Service: General;  Laterality: Left;   • HIP SURGERY Left 01/16/2018    total left hip arthroplasty, anterior approach   • JOINT REPLACEMENT Left     hip   • MOUTH SURGERY  04/25/2022   • SQUAMOUS CELL CARCINOMA EXCISION     • TONSILLECTOMY     • WISDOM TOOTH EXTRACTION         Family History   Problem Relation Age of Onset   • Hepatitis Mother    • Dementia Father    • Stroke Sister    • Hypertension Brother    • Heart attack Paternal Grandfather 70       Social History     Socioeconomic History   • Marital status:    Tobacco Use   • Smoking status: Never Smoker   • Smokeless tobacco: Never Used   • Tobacco comment: no passive smoke exposure   Vaping Use   • Vaping Use: Never used   Substance and Sexual Activity   • Alcohol use: Yes     Comment: very rarely   • Drug use: No   • Sexual activity: Defer     Partners: Female       Allergies   Allergen Reactions   • Biaxin [Clarithromycin] Nausea Only   • Clindamycin/Lincomycin Other (See Comments)     Caused incontinence   • Levaquin [Levofloxacin] Nausea Only   • Nitroglycerin Other (See Comments)     hypotension   • Penicillins Rash         Current Outpatient Medications:   •  Acetaminophen 500 MG coapsule, Take 1,000 mg by mouth As Needed for Mild Pain ., Disp: , Rfl:   •  apixaban (ELIQUIS) 5 MG tablet tablet, Take 5 mg by mouth 2 (Two) Times a Day. Currently in a trial and it is eliquis or placebo.  Patient is not sure . Patient will hold for two days before surgery per orders from Dr Ramirez, Disp: , Rfl:   •  aspirin 81 MG EC tablet, Take 81 mg by mouth Daily. Pt in trial - may be a placebo, Disp: , Rfl:   •  atorvastatin (LIPITOR) 40 MG tablet, Take 1 tablet by mouth Daily., Disp: 90 tablet, Rfl: 0  •  cetirizine (zyrTEC) 10 MG tablet, Take 10 mg by mouth Every Night., Disp: , Rfl:   •  clopidogrel (PLAVIX) 75 MG tablet, Take  "1 tablet by mouth Daily. (Patient taking differently: Take 75 mg by mouth Daily. Will not stop for surgery per office), Disp: 90 tablet, Rfl: 3  •  Cyanocobalamin (VITAMIN B-12 PO), Take 100 mcg by mouth Daily., Disp: , Rfl:   •  fluticasone (FLONASE) 50 MCG/ACT nasal spray, 2 sprays into the nostril(s) as directed by provider Daily. PRN, Disp: , Rfl:   •  hydroCHLOROthiazide (HYDRODIURIL) 25 MG tablet, TAKE 1/2 TABLET BY MOUTH DAILY, Disp: 45 tablet, Rfl: 0  •  lisinopril (PRINIVIL,ZESTRIL) 10 MG tablet, Take 1 tablet by mouth Every Morning., Disp: 90 tablet, Rfl: 1  •  Lumigan 0.01 % ophthalmic drops, Administer 1 drop to both eyes Every Night., Disp: , Rfl:   •  metoprolol tartrate (LOPRESSOR) 50 MG tablet, TAKE 1 TABLET BY MOUTH EVERY 12 HOURS (Patient taking differently: Take 50 mg by mouth 2 (Two) Times a Day.), Disp: 180 tablet, Rfl: 1  •  Multiple Vitamins-Minerals (MULTIVITAMIN ADULT PO), Take 1 tablet by mouth Daily., Disp: , Rfl:     Review of Systems   Constitutional: Negative for chills, fatigue and fever.   HENT: Negative for congestion, ear pain and sinus pressure.    Respiratory: Negative for cough, chest tightness, shortness of breath and wheezing.    Cardiovascular: Negative for chest pain and palpitations.   Gastrointestinal: Negative for abdominal pain, blood in stool and constipation.   Skin: Negative for color change.   Allergic/Immunologic: Negative for environmental allergies.   Neurological: Negative for dizziness, speech difficulty and headache.   Psychiatric/Behavioral: Negative for decreased concentration. The patient is not nervous/anxious.         Vital Signs  Vitals:    05/04/22 0807   BP: 124/70   BP Location: Left arm   Patient Position: Sitting   Cuff Size: Adult   Pulse: 60   Temp: 98.4 °F (36.9 °C)   TempSrc: Temporal   Weight: 122 kg (268 lb 6.4 oz)   Height: 198.1 cm (77.99\")   PainSc:   3   PainLoc: Generalized  Comment: joints       Physical Exam  Vitals reviewed. "   Constitutional:       Appearance: He is well-developed.   HENT:      Head: Normocephalic and atraumatic.   Cardiovascular:      Rate and Rhythm: Normal rate and regular rhythm.      Heart sounds: Normal heart sounds. No murmur heard.  Pulmonary:      Effort: Pulmonary effort is normal.      Breath sounds: Normal breath sounds.   Musculoskeletal:      Comments: He has Dupuytren's contractures on the second and third fingers of the right hand. He has tendon thickened palmar tendon sheaths on the right hand. The PIP joints of the second through fifth fingers are somewhat hypertrophied but not inflamed.   Neurological:      Mental Status: He is alert and oriented to person, place, and time.          Procedures    ACE III MINI             Assessment/Plan:    Diagnoses and all orders for this visit:    1. Diabetic nephropathy associated with type 2 diabetes mellitus (HCC) (Primary)  -     Hemoglobin A1c; Future    2. Stage 3a chronic kidney disease (HCC)    3. Primary hypertension    4. Mixed hyperlipidemia  -     Comprehensive Metabolic Panel; Future  -     Lipid Panel; Future    5. Seasonal allergic rhinitis due to pollen      1. Diabetes  - A1c is pending.  - He will continue to work on healthy diet and avoidance of weight gain.    2. Chronic kidney disease  - GFR is pending.  - The treatment is to control blood pressure and glucose and avoid NSAIDs.    3. Hypertension  - Blood pressure is controlled on hydrochlorothiazide and lisinopril and metoprolol.    4. Hyperlipidemia  - Lipid panel was pending.  - Continue atorvastatin and healthy diet.    5. Poor balance  - This is most likely multifactorial due to peripheral neuropathy and osteoarthritis and some loss of strength.  - We discussed remedies including regular walking and strengthening, physical therapy, or finding a .  - He will work on walking chair, squats, and balance exercises at home and let me know if he wants to go further.    6. Allergic  rhinitis  - Worse this spring.  - I have told him this has been a common complaint.  - He will continue cetirizine and increase Flonase to twice a day.  - If that is not effective, could consider ipratropium spray.    7. Dupuytren's contracture  - No further work-up unless it is worse at which point he would need to see a hand surgeon.    Follow up in 6 months.        Plan of care reviewed with patient at the conclusion of today's visit. Education was provided regarding diagnosis, management, and any prescribed or recommended OTC medications.Patient verbalizes understanding of and agreement with management plan.         Xavier Briones MD           Answers for HPI/ROS submitted by the patient on 5/2/2022  What is the primary reason for your visit?: Physical      Transcribed from ambient dictation for Xavier Briones MD by Rio Long.  05/04/22   11:04 EDT    Patient verbalized consent to the visit recording.

## 2022-05-09 ENCOUNTER — OFFICE VISIT (OUTPATIENT)
Dept: CARDIOLOGY | Facility: CLINIC | Age: 77
End: 2022-05-09

## 2022-05-09 VITALS
HEIGHT: 78 IN | HEART RATE: 82 BPM | BODY MASS INDEX: 30.89 KG/M2 | OXYGEN SATURATION: 96 % | WEIGHT: 267 LBS | SYSTOLIC BLOOD PRESSURE: 110 MMHG | DIASTOLIC BLOOD PRESSURE: 72 MMHG

## 2022-05-09 DIAGNOSIS — I10 PRIMARY HYPERTENSION: ICD-10-CM

## 2022-05-09 DIAGNOSIS — I25.10 CORONARY ARTERY DISEASE INVOLVING NATIVE CORONARY ARTERY OF NATIVE HEART WITHOUT ANGINA PECTORIS: Primary | ICD-10-CM

## 2022-05-09 DIAGNOSIS — E78.2 MIXED HYPERLIPIDEMIA: ICD-10-CM

## 2022-05-09 PROCEDURE — 99214 OFFICE O/P EST MOD 30 MIN: CPT | Performed by: INTERNAL MEDICINE

## 2022-05-09 PROCEDURE — 93283 PRGRMG EVAL IMPLANTABLE DFB: CPT | Performed by: INTERNAL MEDICINE

## 2022-05-09 NOTE — PROGRESS NOTES
[facility]  Office visit  Abbe Palacios  1945  [phone]    VISIT DATE:  5/9/2022      PCP: Xavier Briones MD  [address] [address]    CC:  Chief Complaint   Patient presents with   • Coronary Artery Disease       PROBLEM LIST:  1.  Coronary artery disease:  a.  Status post stenting of right coronary artery, bare-metal stent for acute myocardial infarction, 11/07/2007.  b. Status post elective stenting of proximal left anterior descending coronary artery, 11/14/2007.  c. Elina-infarction VF and post-infarction  ventricular ectopy with positive EP study culminating in dual-chamber ICD implantation, November 2007.  d. Recurrent chest pain with non-STEMI, status post cardiac catheterization, 01/24/2013, Dr. Jarrett:  PTCA and stenting of the first diagonal using  a 2.5 x 24 mm Promus drug-eluting stent, PTCA and stenting of the ramus intermedius using a 2.25 x 20 mm Promus drug-eluting stent.  2. Benign hypertension.  3. Hyperlipidemia.  4. Peripheral neuropathy.  5. Status post ICD generator change,  January 2014.     ASSESSMENT:   Diagnosis Plan   1. Coronary artery disease involving native coronary artery of native heart without angina pectoris     2. Mixed hyperlipidemia     3. Primary hypertension       Device interrogation:  Medtronic dual-chamber ICD  97 %Atrial pacing, sensed P-wave 3.3 mV, capture threshold 0.5 V at 0.4 ms, impedance 475 ohms  Less than 1 % RV pacing, sensed R-wave 6.8 mV, capture threshold 1 mV at 0.4 ms, impedance 399 ohms  No arrhythmia  Estimated battery life 9.5 years  Not dependent      PLAN:  Coronary artery disease: Stable and asymptomatic. continue Plavix.  Continue high intensity statin therapy and current afterload reduction    Elina-infarct V. fib status post ICD implantation: Continue beta-blockade.    Hyperlipidemia: Currently well controlled.  Continue current medical therapy, Goal LDL <100, ideally less than  "70.  Mild hypertriglyceridemia, continue dietary and lifestyle modifications.      Hypertension: Goal less than 130/80, currently well controlled, continue current medical therapy.    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    Paroxysmal atrial fibrillation: Chads vas score equal to 7.  Currently enrolled in [facility], study drug is either aspirin or Eliquis.      Subjective  Report stable functional capacity.  Denies chest pain, palpitations or dyspnea on exertion.  Blood pressures run less than 130/80 mmHg.   No significant orthostasis.  Intermittent mild gait instability.  Denies myalgias on statin therapy.  He is compliant with medical therapy.  Device interrogation was negative for arrhythmia.      PHYSICAL EXAMINATION:  Vitals:    05/09/22 1532   BP: 110/72   BP Location: Left arm   Patient Position: Sitting   Pulse: 82   SpO2: 96%   Weight: 121 kg (267 lb)   Height: 198.1 cm (78\")     General Appearance:    Alert, cooperative, no distress, appears stated age   Head:    Normocephalic, without obvious abnormality, atraumatic   Eyes:    conjunctiva/corneas clear   Nose:   Nares normal, septum midline, mucosa normal, no drainage   Throat:   Lips, teeth and gums normal   Neck:   Supple, symmetrical, trachea midline, no carotid    bruit or JVD   Lungs:     Clear to auscultation bilaterally, respirations unlabored   Chest Wall:    No tenderness or deformity    Heart:    Regular rate and rhythm, S1 and S2 normal, no murmur, rub   or gallop, normal carotid impulse bilaterally without bruit.   Abdomen:     Soft, non-tender   Extremities:   Extremities normal, atraumatic, no cyanosis or edema   Pulses:   2+ and symmetric all extremities   Skin:   Skin color, texture, turgor normal, no rashes or lesions       Diagnostic Data:  Procedures  Lab Results   Component Value Date    CHLPL 152 05/04/2022    TRIG 260 (H) 05/04/2022    HDL 35 (L) 05/04/2022     Lab Results   Component Value " Date    GLUCOSE 130 (H) 05/04/2022    BUN 18 05/04/2022    CREATININE 1.21 05/04/2022     05/04/2022    K 4.2 05/04/2022     05/04/2022    CO2 27.0 05/04/2022     Lab Results   Component Value Date    HGBA1C 7.10 (H) 05/04/2022     Lab Results   Component Value Date    WBC 9.39 03/04/2022    HGB 15.4 03/04/2022    HCT 45.0 03/04/2022     03/04/2022       Allergies  Allergies   Allergen Reactions   • Biaxin [Clarithromycin] Nausea Only   • Clindamycin/Lincomycin Other (See Comments)     Caused incontinence   • Levaquin [Levofloxacin] Nausea Only   • Nitroglycerin Other (See Comments)     hypotension   • Penicillins Rash       Current Medications    Current Outpatient Medications:   •  Acetaminophen 500 MG coapsule, Take 1,000 mg by mouth As Needed for Mild Pain ., Disp: , Rfl:   •  apixaban (ELIQUIS) 5 MG tablet tablet, Take 5 mg by mouth 2 (Two) Times a Day. Currently in a trial and it is eliquis or placebo.  Patient is not sure . Patient will hold for two days before surgery per orders from Dr Ramirez, Disp: , Rfl:   •  aspirin 81 MG EC tablet, Take 81 mg by mouth Daily. Pt in trial - may be a placebo, Disp: , Rfl:   •  atorvastatin (LIPITOR) 40 MG tablet, Take 1 tablet by mouth Daily., Disp: 90 tablet, Rfl: 0  •  cetirizine (zyrTEC) 10 MG tablet, Take 10 mg by mouth Every Night., Disp: , Rfl:   •  clopidogrel (PLAVIX) 75 MG tablet, Take 1 tablet by mouth Daily. (Patient taking differently: Take 75 mg by mouth Daily. Will not stop for surgery per office), Disp: 90 tablet, Rfl: 3  •  Cyanocobalamin (VITAMIN B-12 PO), Take 100 mcg by mouth Daily., Disp: , Rfl:   •  fluticasone (FLONASE) 50 MCG/ACT nasal spray, 2 sprays into the nostril(s) as directed by provider Daily. PRN, Disp: , Rfl:   •  hydroCHLOROthiazide (HYDRODIURIL) 25 MG tablet, TAKE 1/2 TABLET BY MOUTH DAILY, Disp: 45 tablet, Rfl: 0  •  lisinopril (PRINIVIL,ZESTRIL) 10 MG tablet, Take 1 tablet by mouth Every Morning., Disp: 90 tablet,  Rfl: 1  •  Lumigan 0.01 % ophthalmic drops, Administer 1 drop to both eyes Every Night., Disp: , Rfl:   •  metoprolol tartrate (LOPRESSOR) 50 MG tablet, TAKE 1 TABLET BY MOUTH EVERY 12 HOURS (Patient taking differently: Take 50 mg by mouth 2 (Two) Times a Day.), Disp: 180 tablet, Rfl: 1  •  Multiple Vitamins-Minerals (MULTIVITAMIN ADULT PO), Take 1 tablet by mouth Daily., Disp: , Rfl:           ROS  Review of Systems   Cardiovascular: Negative for chest pain, dyspnea on exertion, irregular heartbeat and palpitations.   Respiratory: Negative for shortness of breath and sputum production.          SOCIAL HX  Social History     Socioeconomic History   • Marital status:    Tobacco Use   • Smoking status: Never Smoker   • Smokeless tobacco: Never Used   • Tobacco comment: no passive smoke exposure   Vaping Use   • Vaping Use: Never used   Substance and Sexual Activity   • Alcohol use: Yes     Comment: very rarely   • Drug use: No   • Sexual activity: Defer     Partners: Female       FAMILY HX  Family History   Problem Relation Age of Onset   • Hepatitis Mother    • Dementia Father    • Stroke Sister    • Hypertension Brother    • Heart attack Paternal Grandfather 70             Abbe Mendieta III, MD, FAC

## 2022-05-26 DIAGNOSIS — R05.9 COUGH: Primary | ICD-10-CM

## 2022-05-26 RX ORDER — AZITHROMYCIN 250 MG/1
TABLET, FILM COATED ORAL
Qty: 6 TABLET | Refills: 0 | Status: SHIPPED | OUTPATIENT
Start: 2022-05-26 | End: 2022-06-08

## 2022-05-27 ENCOUNTER — HOSPITAL ENCOUNTER (INPATIENT)
Facility: HOSPITAL | Age: 77
LOS: 2 days | Discharge: HOME | End: 2022-05-29
Attending: EMERGENCY MEDICINE | Admitting: INTERNAL MEDICINE

## 2022-05-27 ENCOUNTER — APPOINTMENT (OUTPATIENT)
Dept: CT IMAGING | Facility: HOSPITAL | Age: 77
End: 2022-05-27

## 2022-05-27 ENCOUNTER — APPOINTMENT (OUTPATIENT)
Dept: GENERAL RADIOLOGY | Facility: HOSPITAL | Age: 77
End: 2022-05-27

## 2022-05-27 DIAGNOSIS — A41.9 ACUTE SEPSIS: ICD-10-CM

## 2022-05-27 DIAGNOSIS — R09.02 HYPOXIA: ICD-10-CM

## 2022-05-27 DIAGNOSIS — J18.9 COMMUNITY ACQUIRED PNEUMONIA OF RIGHT LOWER LOBE OF LUNG: ICD-10-CM

## 2022-05-27 DIAGNOSIS — J18.9 PNEUMONIA DUE TO INFECTIOUS ORGANISM, UNSPECIFIED LATERALITY, UNSPECIFIED PART OF LUNG: ICD-10-CM

## 2022-05-27 DIAGNOSIS — Z86.39 HISTORY OF DIABETES MELLITUS: ICD-10-CM

## 2022-05-27 DIAGNOSIS — J18.9 COMMUNITY ACQUIRED PNEUMONIA OF RIGHT LUNG, UNSPECIFIED PART OF LUNG: Primary | ICD-10-CM

## 2022-05-27 LAB
ALBUMIN SERPL-MCNC: 3.8 G/DL (ref 3.5–5.2)
ALBUMIN/GLOB SERPL: 1.1 G/DL
ALP SERPL-CCNC: 96 U/L (ref 39–117)
ALT SERPL W P-5'-P-CCNC: 19 U/L (ref 1–41)
ANION GAP SERPL CALCULATED.3IONS-SCNC: 15 MMOL/L (ref 5–15)
AST SERPL-CCNC: 30 U/L (ref 1–40)
B PARAPERT DNA SPEC QL NAA+PROBE: NOT DETECTED
B PERT DNA SPEC QL NAA+PROBE: NOT DETECTED
BASOPHILS # BLD AUTO: 0.07 10*3/MM3 (ref 0–0.2)
BASOPHILS NFR BLD AUTO: 0.3 % (ref 0–1.5)
BILIRUB SERPL-MCNC: 1.4 MG/DL (ref 0–1.2)
BUN SERPL-MCNC: 15 MG/DL (ref 8–23)
BUN/CREAT SERPL: 12.3 (ref 7–25)
C PNEUM DNA NPH QL NAA+NON-PROBE: NOT DETECTED
CALCIUM SPEC-SCNC: 9.6 MG/DL (ref 8.6–10.5)
CHLORIDE SERPL-SCNC: 98 MMOL/L (ref 98–107)
CO2 SERPL-SCNC: 23 MMOL/L (ref 22–29)
CREAT SERPL-MCNC: 1.22 MG/DL (ref 0.76–1.27)
D DIMER PPP FEU-MCNC: 2.23 MCGFEU/ML (ref 0.01–0.5)
D-LACTATE SERPL-SCNC: 1.8 MMOL/L (ref 0.5–2)
DEPRECATED RDW RBC AUTO: 43 FL (ref 37–54)
EGFRCR SERPLBLD CKD-EPI 2021: 60.3 ML/MIN/1.73
EOSINOPHIL # BLD AUTO: 0.16 10*3/MM3 (ref 0–0.4)
EOSINOPHIL NFR BLD AUTO: 0.7 % (ref 0.3–6.2)
ERYTHROCYTE [DISTWIDTH] IN BLOOD BY AUTOMATED COUNT: 12.5 % (ref 12.3–15.4)
FLUAV SUBTYP SPEC NAA+PROBE: NOT DETECTED
FLUBV RNA ISLT QL NAA+PROBE: NOT DETECTED
GLOBULIN UR ELPH-MCNC: 3.5 GM/DL
GLUCOSE BLDC GLUCOMTR-MCNC: 122 MG/DL (ref 70–130)
GLUCOSE BLDC GLUCOMTR-MCNC: 124 MG/DL (ref 70–130)
GLUCOSE BLDC GLUCOMTR-MCNC: 126 MG/DL (ref 70–130)
GLUCOSE SERPL-MCNC: 141 MG/DL (ref 65–99)
HADV DNA SPEC NAA+PROBE: NOT DETECTED
HCOV 229E RNA SPEC QL NAA+PROBE: NOT DETECTED
HCOV HKU1 RNA SPEC QL NAA+PROBE: NOT DETECTED
HCOV NL63 RNA SPEC QL NAA+PROBE: NOT DETECTED
HCOV OC43 RNA SPEC QL NAA+PROBE: NOT DETECTED
HCT VFR BLD AUTO: 40.3 % (ref 37.5–51)
HGB BLD-MCNC: 14.2 G/DL (ref 13–17.7)
HMPV RNA NPH QL NAA+NON-PROBE: NOT DETECTED
HOLD SPECIMEN: NORMAL
HPIV1 RNA ISLT QL NAA+PROBE: NOT DETECTED
HPIV2 RNA SPEC QL NAA+PROBE: NOT DETECTED
HPIV3 RNA NPH QL NAA+PROBE: NOT DETECTED
HPIV4 P GENE NPH QL NAA+PROBE: NOT DETECTED
IMM GRANULOCYTES # BLD AUTO: 0.16 10*3/MM3 (ref 0–0.05)
IMM GRANULOCYTES NFR BLD AUTO: 0.7 % (ref 0–0.5)
L PNEUMO1 AG UR QL IA: NEGATIVE
LYMPHOCYTES # BLD AUTO: 2.35 10*3/MM3 (ref 0.7–3.1)
LYMPHOCYTES NFR BLD AUTO: 9.8 % (ref 19.6–45.3)
M PNEUMO IGG SER IA-ACNC: NOT DETECTED
MCH RBC QN AUTO: 32.7 PG (ref 26.6–33)
MCHC RBC AUTO-ENTMCNC: 35.2 G/DL (ref 31.5–35.7)
MCV RBC AUTO: 92.9 FL (ref 79–97)
MONOCYTES # BLD AUTO: 2.62 10*3/MM3 (ref 0.1–0.9)
MONOCYTES NFR BLD AUTO: 10.9 % (ref 5–12)
MRSA DNA SPEC QL NAA+PROBE: NEGATIVE
NEUTROPHILS NFR BLD AUTO: 18.58 10*3/MM3 (ref 1.7–7)
NEUTROPHILS NFR BLD AUTO: 77.6 % (ref 42.7–76)
NRBC BLD AUTO-RTO: 0 /100 WBC (ref 0–0.2)
NT-PROBNP SERPL-MCNC: 538 PG/ML (ref 0–1800)
PLATELET # BLD AUTO: 258 10*3/MM3 (ref 140–450)
PMV BLD AUTO: 10.2 FL (ref 6–12)
POTASSIUM SERPL-SCNC: 3.9 MMOL/L (ref 3.5–5.2)
PROCALCITONIN SERPL-MCNC: 0.24 NG/ML (ref 0–0.25)
PROT SERPL-MCNC: 7.3 G/DL (ref 6–8.5)
QT INTERVAL: 388 MS
QTC INTERVAL: 447 MS
RBC # BLD AUTO: 4.34 10*6/MM3 (ref 4.14–5.8)
RHINOVIRUS RNA SPEC NAA+PROBE: NOT DETECTED
RSV RNA NPH QL NAA+NON-PROBE: NOT DETECTED
S PNEUM AG SPEC QL LA: NEGATIVE
SARS-COV-2 RNA NPH QL NAA+NON-PROBE: NOT DETECTED
SODIUM SERPL-SCNC: 136 MMOL/L (ref 136–145)
TROPONIN T SERPL-MCNC: <0.01 NG/ML (ref 0–0.03)
WBC NRBC COR # BLD: 23.94 10*3/MM3 (ref 3.4–10.8)
WHOLE BLOOD HOLD COAG: NORMAL
WHOLE BLOOD HOLD SPECIMEN: NORMAL

## 2022-05-27 PROCEDURE — 85379 FIBRIN DEGRADATION QUANT: CPT | Performed by: EMERGENCY MEDICINE

## 2022-05-27 PROCEDURE — 85025 COMPLETE CBC W/AUTO DIFF WBC: CPT | Performed by: EMERGENCY MEDICINE

## 2022-05-27 PROCEDURE — 87449 NOS EACH ORGANISM AG IA: CPT | Performed by: INTERNAL MEDICINE

## 2022-05-27 PROCEDURE — 83880 ASSAY OF NATRIURETIC PEPTIDE: CPT | Performed by: EMERGENCY MEDICINE

## 2022-05-27 PROCEDURE — 84484 ASSAY OF TROPONIN QUANT: CPT | Performed by: EMERGENCY MEDICINE

## 2022-05-27 PROCEDURE — 84145 PROCALCITONIN (PCT): CPT | Performed by: HOSPITALIST

## 2022-05-27 PROCEDURE — 71045 X-RAY EXAM CHEST 1 VIEW: CPT

## 2022-05-27 PROCEDURE — 0202U NFCT DS 22 TRGT SARS-COV-2: CPT | Performed by: EMERGENCY MEDICINE

## 2022-05-27 PROCEDURE — 83605 ASSAY OF LACTIC ACID: CPT | Performed by: EMERGENCY MEDICINE

## 2022-05-27 PROCEDURE — 0 IOPAMIDOL PER 1 ML: Performed by: EMERGENCY MEDICINE

## 2022-05-27 PROCEDURE — 82962 GLUCOSE BLOOD TEST: CPT

## 2022-05-27 PROCEDURE — 87040 BLOOD CULTURE FOR BACTERIA: CPT | Performed by: EMERGENCY MEDICINE

## 2022-05-27 PROCEDURE — 99284 EMERGENCY DEPT VISIT MOD MDM: CPT

## 2022-05-27 PROCEDURE — 71275 CT ANGIOGRAPHY CHEST: CPT

## 2022-05-27 PROCEDURE — 87641 MR-STAPH DNA AMP PROBE: CPT | Performed by: INTERNAL MEDICINE

## 2022-05-27 PROCEDURE — 25010000002 CEFTRIAXONE PER 250 MG: Performed by: EMERGENCY MEDICINE

## 2022-05-27 PROCEDURE — 93005 ELECTROCARDIOGRAM TRACING: CPT | Performed by: EMERGENCY MEDICINE

## 2022-05-27 PROCEDURE — 80053 COMPREHEN METABOLIC PANEL: CPT | Performed by: EMERGENCY MEDICINE

## 2022-05-27 PROCEDURE — 36415 COLL VENOUS BLD VENIPUNCTURE: CPT

## 2022-05-27 PROCEDURE — 99223 1ST HOSP IP/OBS HIGH 75: CPT | Performed by: INTERNAL MEDICINE

## 2022-05-27 RX ORDER — ACETAMINOPHEN 160 MG/5ML
650 SOLUTION ORAL EVERY 4 HOURS PRN
Status: DISCONTINUED | OUTPATIENT
Start: 2022-05-27 | End: 2022-05-29 | Stop reason: HOSPADM

## 2022-05-27 RX ORDER — METOPROLOL TARTRATE 50 MG/1
50 TABLET, FILM COATED ORAL EVERY 12 HOURS
Status: DISCONTINUED | OUTPATIENT
Start: 2022-05-27 | End: 2022-05-29 | Stop reason: HOSPADM

## 2022-05-27 RX ORDER — SODIUM CHLORIDE 0.9 % (FLUSH) 0.9 %
10 SYRINGE (ML) INJECTION AS NEEDED
Status: DISCONTINUED | OUTPATIENT
Start: 2022-05-27 | End: 2022-05-29 | Stop reason: HOSPADM

## 2022-05-27 RX ORDER — LISINOPRIL 10 MG/1
10 TABLET ORAL DAILY
Status: DISCONTINUED | OUTPATIENT
Start: 2022-05-27 | End: 2022-05-29 | Stop reason: HOSPADM

## 2022-05-27 RX ORDER — ACETAMINOPHEN 650 MG/1
650 SUPPOSITORY RECTAL EVERY 4 HOURS PRN
Status: DISCONTINUED | OUTPATIENT
Start: 2022-05-27 | End: 2022-05-29 | Stop reason: HOSPADM

## 2022-05-27 RX ORDER — ASPIRIN 81 MG/1
81 TABLET ORAL NIGHTLY
Status: DISCONTINUED | OUTPATIENT
Start: 2022-05-27 | End: 2022-05-29 | Stop reason: HOSPADM

## 2022-05-27 RX ORDER — ONDANSETRON 2 MG/ML
4 INJECTION INTRAMUSCULAR; INTRAVENOUS EVERY 6 HOURS PRN
Status: DISCONTINUED | OUTPATIENT
Start: 2022-05-27 | End: 2022-05-29 | Stop reason: HOSPADM

## 2022-05-27 RX ORDER — GUAIFENESIN AND CODEINE PHOSPHATE 100; 10 MG/5ML; MG/5ML
5 SOLUTION ORAL EVERY 4 HOURS PRN
Status: DISCONTINUED | OUTPATIENT
Start: 2022-05-27 | End: 2022-05-28

## 2022-05-27 RX ORDER — HYDROCHLOROTHIAZIDE 12.5 MG/1
12.5 TABLET ORAL DAILY
Status: DISCONTINUED | OUTPATIENT
Start: 2022-05-27 | End: 2022-05-29 | Stop reason: HOSPADM

## 2022-05-27 RX ORDER — ACETAMINOPHEN 325 MG/1
650 TABLET ORAL EVERY 4 HOURS PRN
Status: DISCONTINUED | OUTPATIENT
Start: 2022-05-27 | End: 2022-05-29 | Stop reason: HOSPADM

## 2022-05-27 RX ORDER — CLOPIDOGREL BISULFATE 75 MG/1
75 TABLET ORAL DAILY
Status: DISCONTINUED | OUTPATIENT
Start: 2022-05-27 | End: 2022-05-29 | Stop reason: HOSPADM

## 2022-05-27 RX ORDER — NICOTINE POLACRILEX 4 MG
15 LOZENGE BUCCAL
Status: DISCONTINUED | OUTPATIENT
Start: 2022-05-27 | End: 2022-05-29 | Stop reason: HOSPADM

## 2022-05-27 RX ORDER — ASPIRIN 81 MG/1
81 TABLET ORAL DAILY
Status: DISCONTINUED | OUTPATIENT
Start: 2022-05-27 | End: 2022-05-27

## 2022-05-27 RX ORDER — IPRATROPIUM BROMIDE AND ALBUTEROL SULFATE 2.5; .5 MG/3ML; MG/3ML
3 SOLUTION RESPIRATORY (INHALATION) EVERY 4 HOURS PRN
Status: DISCONTINUED | OUTPATIENT
Start: 2022-05-27 | End: 2022-05-28

## 2022-05-27 RX ORDER — ATORVASTATIN CALCIUM 40 MG/1
40 TABLET, FILM COATED ORAL DAILY
Status: DISCONTINUED | OUTPATIENT
Start: 2022-05-27 | End: 2022-05-29 | Stop reason: HOSPADM

## 2022-05-27 RX ORDER — INSULIN LISPRO 100 [IU]/ML
0-7 INJECTION, SOLUTION INTRAVENOUS; SUBCUTANEOUS
Status: DISCONTINUED | OUTPATIENT
Start: 2022-05-27 | End: 2022-05-29 | Stop reason: HOSPADM

## 2022-05-27 RX ORDER — FLUTICASONE PROPIONATE 50 MCG
2 SPRAY, SUSPENSION (ML) NASAL DAILY
Status: DISCONTINUED | OUTPATIENT
Start: 2022-05-27 | End: 2022-05-29 | Stop reason: HOSPADM

## 2022-05-27 RX ORDER — DEXTROSE MONOHYDRATE 25 G/50ML
25 INJECTION, SOLUTION INTRAVENOUS
Status: DISCONTINUED | OUTPATIENT
Start: 2022-05-27 | End: 2022-05-29 | Stop reason: HOSPADM

## 2022-05-27 RX ORDER — SODIUM CHLORIDE 0.9 % (FLUSH) 0.9 %
10 SYRINGE (ML) INJECTION EVERY 12 HOURS SCHEDULED
Status: DISCONTINUED | OUTPATIENT
Start: 2022-05-27 | End: 2022-05-29 | Stop reason: HOSPADM

## 2022-05-27 RX ORDER — CETIRIZINE HYDROCHLORIDE 10 MG/1
10 TABLET ORAL NIGHTLY
Status: DISCONTINUED | OUTPATIENT
Start: 2022-05-27 | End: 2022-05-29 | Stop reason: HOSPADM

## 2022-05-27 RX ADMIN — CLOPIDOGREL BISULFATE 75 MG: 75 TABLET ORAL at 08:48

## 2022-05-27 RX ADMIN — CEFTRIAXONE 2 G: 2 INJECTION, POWDER, FOR SOLUTION INTRAMUSCULAR; INTRAVENOUS at 23:48

## 2022-05-27 RX ADMIN — LISINOPRIL 10 MG: 10 TABLET ORAL at 08:48

## 2022-05-27 RX ADMIN — ACETAMINOPHEN 650 MG: 325 TABLET ORAL at 14:46

## 2022-05-27 RX ADMIN — GUAIFENESIN AND CODEINE PHOSPHATE 5 ML: 100; 10 SOLUTION ORAL at 22:56

## 2022-05-27 RX ADMIN — FLUTICASONE PROPIONATE 2 SPRAY: 50 SPRAY, METERED NASAL at 08:48

## 2022-05-27 RX ADMIN — METOPROLOL TARTRATE 50 MG: 50 TABLET, FILM COATED ORAL at 08:48

## 2022-05-27 RX ADMIN — ATORVASTATIN CALCIUM 40 MG: 40 TABLET, FILM COATED ORAL at 08:48

## 2022-05-27 RX ADMIN — CETIRIZINE HYDROCHLORIDE 10 MG: 10 TABLET, FILM COATED ORAL at 22:56

## 2022-05-27 RX ADMIN — METOPROLOL TARTRATE 50 MG: 50 TABLET, FILM COATED ORAL at 20:17

## 2022-05-27 RX ADMIN — ASPIRIN 81 MG: 81 TABLET, COATED ORAL at 20:17

## 2022-05-27 RX ADMIN — IOPAMIDOL 75 ML: 755 INJECTION, SOLUTION INTRAVENOUS at 02:41

## 2022-05-27 RX ADMIN — HYDROCHLOROTHIAZIDE 12.5 MG: 12.5 CAPSULE ORAL at 08:48

## 2022-05-27 RX ADMIN — SODIUM CHLORIDE 1 G: 900 INJECTION INTRAVENOUS at 04:24

## 2022-05-27 RX ADMIN — APIXABAN 5 MG: 5 TABLET, FILM COATED ORAL at 20:17

## 2022-05-27 RX ADMIN — DOXYCYCLINE 100 MG: 100 INJECTION, POWDER, LYOPHILIZED, FOR SOLUTION INTRAVENOUS at 05:03

## 2022-05-27 RX ADMIN — DOXYCYCLINE 100 MG: 100 INJECTION, POWDER, LYOPHILIZED, FOR SOLUTION INTRAVENOUS at 20:14

## 2022-05-27 RX ADMIN — Medication 10 ML: at 06:22

## 2022-05-27 RX ADMIN — Medication 10 ML: at 20:17

## 2022-05-28 ENCOUNTER — APPOINTMENT (OUTPATIENT)
Dept: GENERAL RADIOLOGY | Facility: HOSPITAL | Age: 77
End: 2022-05-28

## 2022-05-28 LAB
ANION GAP SERPL CALCULATED.3IONS-SCNC: 12 MMOL/L (ref 5–15)
BUN SERPL-MCNC: 14 MG/DL (ref 8–23)
BUN/CREAT SERPL: 14.4 (ref 7–25)
CALCIUM SPEC-SCNC: 9.1 MG/DL (ref 8.6–10.5)
CHLORIDE SERPL-SCNC: 99 MMOL/L (ref 98–107)
CO2 SERPL-SCNC: 28 MMOL/L (ref 22–29)
CREAT SERPL-MCNC: 0.97 MG/DL (ref 0.76–1.27)
DEPRECATED RDW RBC AUTO: 43.6 FL (ref 37–54)
EGFRCR SERPLBLD CKD-EPI 2021: 79.4 ML/MIN/1.73
ERYTHROCYTE [DISTWIDTH] IN BLOOD BY AUTOMATED COUNT: 12.8 % (ref 12.3–15.4)
GLUCOSE BLDC GLUCOMTR-MCNC: 106 MG/DL (ref 70–130)
GLUCOSE BLDC GLUCOMTR-MCNC: 111 MG/DL (ref 70–130)
GLUCOSE BLDC GLUCOMTR-MCNC: 112 MG/DL (ref 70–130)
GLUCOSE SERPL-MCNC: 115 MG/DL (ref 65–99)
HCT VFR BLD AUTO: 37.4 % (ref 37.5–51)
HGB BLD-MCNC: 13.1 G/DL (ref 13–17.7)
MAGNESIUM SERPL-MCNC: 1.8 MG/DL (ref 1.6–2.4)
MCH RBC QN AUTO: 32.4 PG (ref 26.6–33)
MCHC RBC AUTO-ENTMCNC: 35 G/DL (ref 31.5–35.7)
MCV RBC AUTO: 92.6 FL (ref 79–97)
PLATELET # BLD AUTO: 285 10*3/MM3 (ref 140–450)
PMV BLD AUTO: 9.7 FL (ref 6–12)
POTASSIUM SERPL-SCNC: 3.7 MMOL/L (ref 3.5–5.2)
RBC # BLD AUTO: 4.04 10*6/MM3 (ref 4.14–5.8)
SODIUM SERPL-SCNC: 139 MMOL/L (ref 136–145)
WBC NRBC COR # BLD: 19.13 10*3/MM3 (ref 3.4–10.8)

## 2022-05-28 PROCEDURE — 83735 ASSAY OF MAGNESIUM: CPT | Performed by: INTERNAL MEDICINE

## 2022-05-28 PROCEDURE — 71045 X-RAY EXAM CHEST 1 VIEW: CPT

## 2022-05-28 PROCEDURE — 82962 GLUCOSE BLOOD TEST: CPT

## 2022-05-28 PROCEDURE — 99232 SBSQ HOSP IP/OBS MODERATE 35: CPT | Performed by: INTERNAL MEDICINE

## 2022-05-28 PROCEDURE — 25010000002 CEFTRIAXONE PER 250 MG: Performed by: INTERNAL MEDICINE

## 2022-05-28 PROCEDURE — 80048 BASIC METABOLIC PNL TOTAL CA: CPT | Performed by: HOSPITALIST

## 2022-05-28 PROCEDURE — 85027 COMPLETE CBC AUTOMATED: CPT | Performed by: HOSPITALIST

## 2022-05-28 PROCEDURE — 94799 UNLISTED PULMONARY SVC/PX: CPT

## 2022-05-28 PROCEDURE — 94640 AIRWAY INHALATION TREATMENT: CPT

## 2022-05-28 RX ORDER — IPRATROPIUM BROMIDE AND ALBUTEROL SULFATE 2.5; .5 MG/3ML; MG/3ML
3 SOLUTION RESPIRATORY (INHALATION)
Status: DISCONTINUED | OUTPATIENT
Start: 2022-05-28 | End: 2022-05-29 | Stop reason: HOSPADM

## 2022-05-28 RX ORDER — GUAIFENESIN AND CODEINE PHOSPHATE 100; 10 MG/5ML; MG/5ML
SOLUTION ORAL EVERY 6 HOURS SCHEDULED
Status: DISCONTINUED | OUTPATIENT
Start: 2022-05-28 | End: 2022-05-29 | Stop reason: HOSPADM

## 2022-05-28 RX ADMIN — APIXABAN 5 MG: 5 TABLET, FILM COATED ORAL at 08:51

## 2022-05-28 RX ADMIN — CLOPIDOGREL BISULFATE 75 MG: 75 TABLET ORAL at 08:50

## 2022-05-28 RX ADMIN — CETIRIZINE HYDROCHLORIDE 10 MG: 10 TABLET, FILM COATED ORAL at 20:14

## 2022-05-28 RX ADMIN — ATORVASTATIN CALCIUM 40 MG: 40 TABLET, FILM COATED ORAL at 08:51

## 2022-05-28 RX ADMIN — METOPROLOL TARTRATE 50 MG: 50 TABLET, FILM COATED ORAL at 20:14

## 2022-05-28 RX ADMIN — ASPIRIN 81 MG: 81 TABLET, COATED ORAL at 20:14

## 2022-05-28 RX ADMIN — LISINOPRIL 10 MG: 10 TABLET ORAL at 08:50

## 2022-05-28 RX ADMIN — GUAIFENESIN AND CODEINE PHOSPHATE 5 ML: 100; 10 SOLUTION ORAL at 20:14

## 2022-05-28 RX ADMIN — DOXYCYCLINE 100 MG: 100 INJECTION, POWDER, LYOPHILIZED, FOR SOLUTION INTRAVENOUS at 20:16

## 2022-05-28 RX ADMIN — METOPROLOL TARTRATE 50 MG: 50 TABLET, FILM COATED ORAL at 08:50

## 2022-05-28 RX ADMIN — GUAIFENESIN AND CODEINE PHOSPHATE 5 ML: 100; 10 SOLUTION ORAL at 14:07

## 2022-05-28 RX ADMIN — APIXABAN 5 MG: 5 TABLET, FILM COATED ORAL at 20:14

## 2022-05-28 RX ADMIN — HYDROCHLOROTHIAZIDE 12.5 MG: 12.5 CAPSULE ORAL at 08:50

## 2022-05-28 RX ADMIN — IPRATROPIUM BROMIDE AND ALBUTEROL SULFATE 3 ML: .5; 3 SOLUTION RESPIRATORY (INHALATION) at 19:38

## 2022-05-28 RX ADMIN — Medication 400 MG: at 11:40

## 2022-05-28 RX ADMIN — Medication 10 ML: at 08:52

## 2022-05-28 RX ADMIN — FLUTICASONE PROPIONATE 2 SPRAY: 50 SPRAY, METERED NASAL at 08:51

## 2022-05-28 RX ADMIN — DOXYCYCLINE 100 MG: 100 INJECTION, POWDER, LYOPHILIZED, FOR SOLUTION INTRAVENOUS at 08:52

## 2022-05-28 RX ADMIN — Medication 10 ML: at 20:20

## 2022-05-29 ENCOUNTER — READMISSION MANAGEMENT (OUTPATIENT)
Dept: CALL CENTER | Facility: HOSPITAL | Age: 77
End: 2022-05-29

## 2022-05-29 VITALS
HEIGHT: 78 IN | TEMPERATURE: 98 F | HEART RATE: 75 BPM | RESPIRATION RATE: 18 BRPM | OXYGEN SATURATION: 93 % | BODY MASS INDEX: 30.45 KG/M2 | SYSTOLIC BLOOD PRESSURE: 144 MMHG | WEIGHT: 263.2 LBS | DIASTOLIC BLOOD PRESSURE: 91 MMHG

## 2022-05-29 LAB
ANION GAP SERPL CALCULATED.3IONS-SCNC: 11 MMOL/L (ref 5–15)
BASOPHILS # BLD AUTO: 0.08 10*3/MM3 (ref 0–0.2)
BASOPHILS NFR BLD AUTO: 0.6 % (ref 0–1.5)
BUN SERPL-MCNC: 17 MG/DL (ref 8–23)
BUN/CREAT SERPL: 15.2 (ref 7–25)
CALCIUM SPEC-SCNC: 9.3 MG/DL (ref 8.6–10.5)
CHLORIDE SERPL-SCNC: 100 MMOL/L (ref 98–107)
CO2 SERPL-SCNC: 29 MMOL/L (ref 22–29)
CREAT SERPL-MCNC: 1.12 MG/DL (ref 0.76–1.27)
DEPRECATED RDW RBC AUTO: 42.4 FL (ref 37–54)
EGFRCR SERPLBLD CKD-EPI 2021: 66.8 ML/MIN/1.73
EOSINOPHIL # BLD AUTO: 0.5 10*3/MM3 (ref 0–0.4)
EOSINOPHIL NFR BLD AUTO: 3.5 % (ref 0.3–6.2)
ERYTHROCYTE [DISTWIDTH] IN BLOOD BY AUTOMATED COUNT: 12.5 % (ref 12.3–15.4)
GLUCOSE BLDC GLUCOMTR-MCNC: 111 MG/DL (ref 70–130)
GLUCOSE SERPL-MCNC: 107 MG/DL (ref 65–99)
HCT VFR BLD AUTO: 36.6 % (ref 37.5–51)
HGB BLD-MCNC: 12.9 G/DL (ref 13–17.7)
IMM GRANULOCYTES # BLD AUTO: 0.15 10*3/MM3 (ref 0–0.05)
IMM GRANULOCYTES NFR BLD AUTO: 1 % (ref 0–0.5)
LYMPHOCYTES # BLD AUTO: 2.91 10*3/MM3 (ref 0.7–3.1)
LYMPHOCYTES NFR BLD AUTO: 20.1 % (ref 19.6–45.3)
MCH RBC QN AUTO: 32.5 PG (ref 26.6–33)
MCHC RBC AUTO-ENTMCNC: 35.2 G/DL (ref 31.5–35.7)
MCV RBC AUTO: 92.2 FL (ref 79–97)
MONOCYTES # BLD AUTO: 1.52 10*3/MM3 (ref 0.1–0.9)
MONOCYTES NFR BLD AUTO: 10.5 % (ref 5–12)
NEUTROPHILS NFR BLD AUTO: 64.3 % (ref 42.7–76)
NEUTROPHILS NFR BLD AUTO: 9.31 10*3/MM3 (ref 1.7–7)
NRBC BLD AUTO-RTO: 0 /100 WBC (ref 0–0.2)
PLATELET # BLD AUTO: 279 10*3/MM3 (ref 140–450)
PMV BLD AUTO: 9.8 FL (ref 6–12)
POTASSIUM SERPL-SCNC: 3.8 MMOL/L (ref 3.5–5.2)
RBC # BLD AUTO: 3.97 10*6/MM3 (ref 4.14–5.8)
SODIUM SERPL-SCNC: 140 MMOL/L (ref 136–145)
WBC NRBC COR # BLD: 14.47 10*3/MM3 (ref 3.4–10.8)

## 2022-05-29 PROCEDURE — 85025 COMPLETE CBC W/AUTO DIFF WBC: CPT | Performed by: INTERNAL MEDICINE

## 2022-05-29 PROCEDURE — 94664 DEMO&/EVAL PT USE INHALER: CPT

## 2022-05-29 PROCEDURE — 82962 GLUCOSE BLOOD TEST: CPT

## 2022-05-29 PROCEDURE — 25010000002 CEFTRIAXONE PER 250 MG: Performed by: INTERNAL MEDICINE

## 2022-05-29 PROCEDURE — 99239 HOSP IP/OBS DSCHRG MGMT >30: CPT | Performed by: INTERNAL MEDICINE

## 2022-05-29 PROCEDURE — 80048 BASIC METABOLIC PNL TOTAL CA: CPT | Performed by: INTERNAL MEDICINE

## 2022-05-29 PROCEDURE — 94799 UNLISTED PULMONARY SVC/PX: CPT

## 2022-05-29 RX ORDER — ALBUTEROL SULFATE 90 UG/1
2 AEROSOL, METERED RESPIRATORY (INHALATION) EVERY 4 HOURS PRN
Qty: 8 G | Refills: 0 | Status: SHIPPED | OUTPATIENT
Start: 2022-05-29 | End: 2022-11-11

## 2022-05-29 RX ORDER — DOXYCYCLINE HYCLATE 100 MG/1
100 CAPSULE ORAL 2 TIMES DAILY
Qty: 14 CAPSULE | Refills: 0 | Status: SHIPPED | OUTPATIENT
Start: 2022-05-29 | End: 2022-06-08

## 2022-05-29 RX ORDER — GUAIFENESIN/DEXTROMETHORPHAN 100-10MG/5
5 SYRUP ORAL 3 TIMES DAILY PRN
Qty: 237 ML | Refills: 0 | Status: SHIPPED | OUTPATIENT
Start: 2022-05-29

## 2022-05-29 RX ORDER — CEFDINIR 300 MG/1
300 CAPSULE ORAL 2 TIMES DAILY
Qty: 14 CAPSULE | Refills: 0 | Status: SHIPPED | OUTPATIENT
Start: 2022-05-29 | End: 2022-06-05

## 2022-05-29 RX ADMIN — LISINOPRIL 10 MG: 10 TABLET ORAL at 08:49

## 2022-05-29 RX ADMIN — GUAIFENESIN AND CODEINE PHOSPHATE 5 ML: 100; 10 SOLUTION ORAL at 01:07

## 2022-05-29 RX ADMIN — GUAIFENESIN AND CODEINE PHOSPHATE 5 ML: 100; 10 SOLUTION ORAL at 06:28

## 2022-05-29 RX ADMIN — DOXYCYCLINE 100 MG: 100 INJECTION, POWDER, LYOPHILIZED, FOR SOLUTION INTRAVENOUS at 06:28

## 2022-05-29 RX ADMIN — ACETAMINOPHEN 650 MG: 325 TABLET ORAL at 01:09

## 2022-05-29 RX ADMIN — HYDROCHLOROTHIAZIDE 12.5 MG: 12.5 CAPSULE ORAL at 08:49

## 2022-05-29 RX ADMIN — CLOPIDOGREL BISULFATE 75 MG: 75 TABLET ORAL at 08:49

## 2022-05-29 RX ADMIN — METOPROLOL TARTRATE 50 MG: 50 TABLET, FILM COATED ORAL at 08:49

## 2022-05-29 RX ADMIN — IPRATROPIUM BROMIDE AND ALBUTEROL SULFATE 3 ML: .5; 3 SOLUTION RESPIRATORY (INHALATION) at 06:57

## 2022-05-29 RX ADMIN — CEFTRIAXONE 2 G: 2 INJECTION, POWDER, FOR SOLUTION INTRAMUSCULAR; INTRAVENOUS at 00:29

## 2022-05-29 RX ADMIN — Medication 10 ML: at 08:49

## 2022-05-29 RX ADMIN — FLUTICASONE PROPIONATE 2 SPRAY: 50 SPRAY, METERED NASAL at 08:50

## 2022-05-29 RX ADMIN — APIXABAN 5 MG: 5 TABLET, FILM COATED ORAL at 08:49

## 2022-05-29 RX ADMIN — ATORVASTATIN CALCIUM 40 MG: 40 TABLET, FILM COATED ORAL at 08:49

## 2022-05-29 RX ADMIN — Medication 400 MG: at 00:28

## 2022-05-29 NOTE — OUTREACH NOTE
Prep Survey    Flowsheet Row Responses   [facility] facility patient discharged from? [place]   Is LACE score < 7 ? No   Emergency Room discharge w/ pulse ox? No   Eligibility [facility]   Date of Admission 05/27/22   Date of Discharge 05/29/22   Discharge Disposition Home or Self Care   Discharge diagnosis community acquired pneumonia   Does the patient have one of the following disease processes/diagnoses(primary or secondary)? COPD/Pneumonia   Does the patient have Home health ordered? No   Is there a DME ordered? No   Prep survey completed? Yes          LYNN RODRIGUEZ - Registered Nurse

## 2022-05-30 ENCOUNTER — TRANSITIONAL CARE MANAGEMENT TELEPHONE ENCOUNTER (OUTPATIENT)
Dept: CALL CENTER | Facility: HOSPITAL | Age: 77
End: 2022-05-30

## 2022-05-30 NOTE — OUTREACH NOTE
Call Center TCM Note    Flowsheet Row Responses   [facility] patient discharged from? [place]   Does the patient have one of the following disease processes/diagnoses(primary or secondary)? COPD/Pneumonia   Was the primary reason for admission: Pneumonia   TCM attempt successful? Yes   Call start time 1405   Call end time 1409   Discharge diagnosis community acquired pneumonia   Person spoke with today (if not patient) and relationship patient   Meds reviewed with patient/caregiver? Yes  [New: albuterol inhaler, cefdinir, doxycycline, robitussin DM]   Does the patient have all medications ordered at discharge? Yes   Is the patient taking all medications as directed (includes completed medication regime)? Yes   Does the patient have a primary care provider?  Yes   Does the patient have an appointment with their PCP or specialist within 7 days of discharge? Greater than 7 days   Comments regarding PCP Xavier Briones MD PCP. Hospital follow up scheduled for Monday 6/6/22  330pm with PCP    What is preventing the patient from scheduling follow up appointments within 7 days of discharge? Earlier appointment not available   Has the patient kept scheduled appointments due by today? N/A   Has home health visited the patient within 72 hours of discharge? N/A   DME comments Did not require home O2   Psychosocial issues? No   Did the patient receive a copy of their discharge instructions? Yes   Nursing interventions Reviewed instructions with patient   What is the patient's perception of their health status since discharge? Improving   If the patient is a current smoker, are they able to teach back resources for cessation? Not a smoker   Is the patient/caregiver able to teach back the hierarchy of who to call/visit for symptoms/problems? PCP, Specialist, Home health nurse, Urgent Care, ED, 911 Yes   Is the patient/caregiver able to teach back signs and symptoms of worsening condition: Fever/chills, Shortness of  breath, Chest pain   Is the patient/caregiver able to teach back importance of completing antibiotic course of treatment? Yes   TCM call completed? Yes   Wrap up additional comments Denies further questions or needs today.           Luly Mao RN    5/30/2022, 14:09 EDT

## 2022-06-01 LAB
BACTERIA SPEC AEROBE CULT: NORMAL
BACTERIA SPEC AEROBE CULT: NORMAL

## 2022-06-06 ENCOUNTER — OFFICE VISIT (OUTPATIENT)
Dept: INTERNAL MEDICINE | Facility: CLINIC | Age: 77
End: 2022-06-06

## 2022-06-06 VITALS
DIASTOLIC BLOOD PRESSURE: 68 MMHG | BODY MASS INDEX: 30.5 KG/M2 | TEMPERATURE: 98.2 F | WEIGHT: 263.6 LBS | SYSTOLIC BLOOD PRESSURE: 98 MMHG | HEIGHT: 78 IN | HEART RATE: 76 BPM

## 2022-06-06 DIAGNOSIS — J18.9 COMMUNITY ACQUIRED PNEUMONIA OF RIGHT LOWER LOBE OF LUNG: Primary | ICD-10-CM

## 2022-06-06 PROCEDURE — 1111F DSCHRG MED/CURRENT MED MERGE: CPT | Performed by: INTERNAL MEDICINE

## 2022-06-06 PROCEDURE — 99495 TRANSJ CARE MGMT MOD F2F 14D: CPT | Performed by: INTERNAL MEDICINE

## 2022-06-06 NOTE — PROGRESS NOTES
Transitional Care Follow Up Visit  Subjective     Abbe Palacios is a 77 y.o. male who presents for a transitional care management visit.    Within 48 business hours after discharge our office contacted him via telephone to coordinate his care and needs.      I reviewed and discussed the details of that call along with the discharge summary, hospital problems, inpatient lab results, inpatient diagnostic studies, and consultation reports with Abbe.     Current outpatient and discharge medications have been reconciled for the patient.  Reviewed by: Xavier Briones MD    The patient comes in for a  transitional care management visit. He was admitted to the hospital on 05/27/2022 with sepsis and pneumonia. He was discharged on 05/29/2021 with albuterol, cefdinir, doxycycline, and guaifenesin dextromethorphan.    The patient  states he is very much better. The patient reports he did not have a lot of discomfort breathing. He states the biggest problem was coughing and him trying to expectorate it. The patient reports his strength and stamina are not bad. He took a walk earlier today for 30 minutes; however, he got tired at the end, He has not been able to do a whole lot since he got out of the hospital 1 week ago. The patient states he finished his antibiotics yesterday. He states he is still taking a cough medicine at night. The patient states he is eating and drinking well. He denies any bowel problems and is having regular bowel movements.     Date of TCM Phone Call 5/29/2022   [facility]   Date of Admission 5/27/2022   Date of Discharge 5/29/2022   Discharge Disposition Home or Self Care     Risk for Readmission (LACE) Score: 11 (5/29/2022  6:00 AM)      History of Present Illness   Course During Hospital Stay:  See above     The following portions of the patient's history were reviewed and updated as appropriate: allergies, current medications, past family history, past medical history, past social  history, past surgical history and problem list.    Review of Systems    Objective   Physical Exam  Vitals reviewed.   Constitutional:       Appearance: Normal appearance. He is well-developed.   HENT:      Head: Normocephalic and atraumatic.      Right Ear: Hearing, tympanic membrane, ear canal and external ear normal.      Left Ear: Hearing, tympanic membrane, ear canal and external ear normal.      Nose: Nose normal.      Mouth/Throat:      Pharynx: Uvula midline.   Eyes:      General: Lids are normal.      Conjunctiva/sclera: Conjunctivae normal.      Pupils: Pupils are equal, round, and reactive to light.   Cardiovascular:      Rate and Rhythm: Normal rate and regular rhythm.      Heart sounds: Normal heart sounds.   Pulmonary:      Effort: Pulmonary effort is normal.      Breath sounds: Rales present.      Comments:  Right basilar rales.  Abdominal:      General: Bowel sounds are normal.      Palpations: Abdomen is soft.   Musculoskeletal:         General: Normal range of motion.      Cervical back: Full passive range of motion without pain, normal range of motion and neck supple.   Skin:     General: Skin is warm and dry.   Neurological:      Mental Status: He is alert and oriented to person, place, and time.   Psychiatric:         Speech: Speech normal.         Behavior: Behavior normal.         Thought Content: Thought content normal.         Judgment: Judgment normal.         Assessment & Plan   Diagnoses and all orders for this visit:    1. Community acquired pneumonia of right lower lobe of lung (Primary)        1. Right lower lobe pneumonia  - Clinically resolving. He finished his antibiotics yesterday and we will have a repeat chest x-ray next week Only if he has recurring fever or shortness of breath, would we extend course of antibiotics. He will otherwise follow up as scheduled.    Transcribed from ambient dictation for Xavier Briones MD by Wilda Farooq.   06/06/22   20:00 EDT    Patient  verbalized consent to the visit recording.

## 2022-06-10 RX ORDER — LISINOPRIL 10 MG/1
10 TABLET ORAL EVERY MORNING
Qty: 90 TABLET | Refills: 1 | Status: SHIPPED | OUTPATIENT
Start: 2022-06-10 | End: 2022-12-14 | Stop reason: SDUPTHER

## 2022-06-13 ENCOUNTER — HOSPITAL ENCOUNTER (OUTPATIENT)
Dept: GENERAL RADIOLOGY | Facility: HOSPITAL | Age: 77
Discharge: HOME | End: 2022-06-13
Admitting: INTERNAL MEDICINE

## 2022-06-13 DIAGNOSIS — J18.9 COMMUNITY ACQUIRED PNEUMONIA OF RIGHT LUNG, UNSPECIFIED PART OF LUNG: ICD-10-CM

## 2022-06-13 PROCEDURE — 71046 X-RAY EXAM CHEST 2 VIEWS: CPT

## 2022-06-20 RX ORDER — ATORVASTATIN CALCIUM 40 MG/1
40 TABLET, FILM COATED ORAL DAILY
Qty: 90 TABLET | Refills: 0 | Status: SHIPPED | OUTPATIENT
Start: 2022-06-20 | End: 2022-09-19

## 2022-06-27 RX ORDER — METOPROLOL TARTRATE 50 MG/1
TABLET, FILM COATED ORAL
Qty: 180 TABLET | Refills: 0 | Status: SHIPPED | OUTPATIENT
Start: 2022-06-27 | End: 2022-09-26

## 2022-06-27 RX ORDER — HYDROCHLOROTHIAZIDE 25 MG/1
TABLET ORAL
Qty: 45 TABLET | Refills: 0 | Status: SHIPPED | OUTPATIENT
Start: 2022-06-27 | End: 2022-09-26

## 2022-07-11 ENCOUNTER — TELEPHONE (OUTPATIENT)
Dept: CARDIOLOGY | Facility: CLINIC | Age: 77
End: 2022-07-11

## 2022-08-23 NOTE — DISCHARGE INSTRUCTIONS
Patient returning call. She would like a call back when someone is available.    The following information and instructions were given:    Do not eat, drink, smoke or chew gum after midnight the night before surgery. This includes no mints.  Take all routine, prescribed medications including heart and blood pressure medicines with a sip of water unless otherwise instructed by your physician.   Do NOT take diabetic medication unless instructed by your physician.    DO NOT shave for two days before your procedure.  Do not wear makeup.      DO NOT wear fingernail polish (gel/regular) and/or acrylic/artificial nails on the day of surgery.   If you had a recent manicure and would rather not remove polish or artificial nails, the minimum requirement is that the polish/artificial nails must be removed from the middle finger on each hand.      If you are having surgery/procedure on an upper extremity, fingernail polish/artificial fingernails must be removed for surgery.  NO EXCEPTIONS.      If you are having surgery/procedure on a lower extremity, toenail polish on both extremities must be removed for surgery.  NO EXCEPTIONS.    Remove all jewelry (advise to go to jeweler if unable to remove).  Jewelry, especially rings, can no longer be taped for surgery.    Leave anything you consider valuable at home.    Leave your suitcase in the car until after your surgery.    Bring the following with you the day of your procedure (when applicable):       -Picture ID and insurance cards       -Co-pay/deductible required by insurance       -Medications in the original bottles (not a list) including all over-the-counter medications if not brought to PAT       -Copy of advance directive, living will or power of  documents if not brought to PAT       -CPAP or BIPAP mask and tubing (do not bring machine)       -Skin prep instruction(s) sheet       -PAT Pass    Education booklet, brochure, handout or binder related to procedure given to patient.  Education booklet also includes general information related to  their recovery that mentions signs and symptoms of infection and when to call the doctor.    When applicable, an ERAS handout/booklet was given to patient.    Pain Control After Surgery handout given to patient.    Respirex use (handout given to patient) and pneumonia prevention education provided.    Signs and Symptoms of infection discussed with patient in Pre Admission Testing.  Patient instructed to call their doctor if any of the following symptoms are noted during recovery:  Fever of 100.4 F or higher, incision that is warm or has increasing bleeding, redness or drainage.    DVT Prevention instructions given verbally during Pre Admission Testing appointment that stress the importance of ambulation to improve blood circulation.  Also encouraged patient to perform foot exercises when in bed and application of a sequential device may be applied to lower extremities to improve circulation.      Please apply Chlorhexidine wipes to surgical area (if instructed) the night before procedure and the AM of procedure and document date/time of applications on skin prep instruction sheet.

## 2022-09-12 ENCOUNTER — TELEPHONE (OUTPATIENT)
Dept: CARDIOLOGY | Facility: CLINIC | Age: 77
End: 2022-09-12

## 2022-09-12 NOTE — TELEPHONE ENCOUNTER
Ange at 's office is requesting him to hold his 2 blood thinners prior to his upcoming dental implant. Please advise.

## 2022-09-19 RX ORDER — ATORVASTATIN CALCIUM 40 MG/1
40 TABLET, FILM COATED ORAL DAILY
Qty: 90 TABLET | Refills: 0 | Status: SHIPPED | OUTPATIENT
Start: 2022-09-19 | End: 2022-12-14 | Stop reason: SDUPTHER

## 2022-09-24 PROCEDURE — 93296 REM INTERROG EVL PM/IDS: CPT | Performed by: INTERNAL MEDICINE

## 2022-09-24 PROCEDURE — 93295 DEV INTERROG REMOTE 1/2/MLT: CPT | Performed by: INTERNAL MEDICINE

## 2022-09-26 RX ORDER — METOPROLOL TARTRATE 50 MG/1
TABLET, FILM COATED ORAL
Qty: 180 TABLET | Refills: 0 | Status: SHIPPED | OUTPATIENT
Start: 2022-09-26 | End: 2022-10-03

## 2022-09-26 RX ORDER — HYDROCHLOROTHIAZIDE 25 MG/1
TABLET ORAL
Qty: 45 TABLET | Refills: 0 | Status: SHIPPED | OUTPATIENT
Start: 2022-09-26 | End: 2022-12-14 | Stop reason: SDUPTHER

## 2022-10-03 RX ORDER — CLOPIDOGREL BISULFATE 75 MG/1
75 TABLET ORAL DAILY
Qty: 90 TABLET | Refills: 0 | Status: SHIPPED | OUTPATIENT
Start: 2022-10-03 | End: 2023-03-14 | Stop reason: SDUPTHER

## 2022-10-03 RX ORDER — METOPROLOL TARTRATE 50 MG/1
TABLET, FILM COATED ORAL
Qty: 180 TABLET | Refills: 0 | Status: SHIPPED | OUTPATIENT
Start: 2022-10-03 | End: 2023-03-14 | Stop reason: SDUPTHER

## 2022-11-09 ENCOUNTER — OFFICE VISIT (OUTPATIENT)
Dept: CARDIOLOGY | Facility: CLINIC | Age: 77
End: 2022-11-09

## 2022-11-09 VITALS
HEIGHT: 78 IN | HEART RATE: 85 BPM | BODY MASS INDEX: 30.89 KG/M2 | DIASTOLIC BLOOD PRESSURE: 80 MMHG | WEIGHT: 267 LBS | SYSTOLIC BLOOD PRESSURE: 112 MMHG | OXYGEN SATURATION: 93 %

## 2022-11-09 DIAGNOSIS — I10 PRIMARY HYPERTENSION: ICD-10-CM

## 2022-11-09 DIAGNOSIS — E78.2 MIXED HYPERLIPIDEMIA: ICD-10-CM

## 2022-11-09 DIAGNOSIS — I25.10 CORONARY ARTERY DISEASE INVOLVING NATIVE CORONARY ARTERY OF NATIVE HEART WITHOUT ANGINA PECTORIS: Primary | ICD-10-CM

## 2022-11-09 DIAGNOSIS — I49.01 VF (VENTRICULAR FIBRILLATION): ICD-10-CM

## 2022-11-09 PROCEDURE — 99214 OFFICE O/P EST MOD 30 MIN: CPT | Performed by: INTERNAL MEDICINE

## 2022-11-09 PROCEDURE — 93283 PRGRMG EVAL IMPLANTABLE DFB: CPT | Performed by: INTERNAL MEDICINE

## 2022-11-09 NOTE — PROGRESS NOTES
[facility]  Office visit  Abbe Palacios  1945  [phone]    VISIT DATE:  11/9/2022      PCP: Xavier Briones MD  [id number] [address]    CC:  Chief Complaint   Patient presents with   • Coronary artery disease involving native coronary artery of       PROBLEM LIST:  1.  Coronary artery disease:  a.  Status post stenting of right coronary artery, bare-metal stent for acute myocardial infarction, 11/07/2007.  b. Status post elective stenting of proximal left anterior descending coronary artery, 11/14/2007.  c. Elina-infarction VF and post-infarction  ventricular ectopy with positive EP study culminating in dual-chamber ICD implantation, November 2007.  d. Recurrent chest pain with non-STEMI, status post cardiac catheterization, 01/24/2013, Dr. Jarrett:  PTCA and stenting of the first diagonal using  a 2.5 x 24 mm Promus drug-eluting stent, PTCA and stenting of the ramus intermedius using a 2.25 x 20 mm Promus drug-eluting stent.  2. Benign hypertension.  3. Hyperlipidemia.  4. Peripheral neuropathy.  5. Status post ICD generator change,  January 2014.     ASSESSMENT:   Diagnosis Plan   1. Coronary artery disease involving native coronary artery of native heart without angina pectoris        2. Mixed hyperlipidemia        3. Primary hypertension        4. VF (ventricular fibrillation) (Formerly [facility])          Device interrogation:  Medtronic dual-chamber ICD  97 %Atrial pacing, sensed P-wave 2.4 mV, capture threshold 0.5 V at 0.4 ms, impedance 475 ohms  Less than 1 % RV pacing, sensed R-wave 6.9 418 mV, capture threshold 1 mV at 0.4 ms, impedance 399 ohms  No arrhythmia  Estimated battery life 9.1 years  Not dependent      PLAN:  Coronary artery disease: Stable and asymptomatic. continue Plavix.  Continue high intensity statin therapy and current afterload reduction    Elina-infarct V. fib status post ICD implantation: Continue beta-blockade.    Hyperlipidemia:  "Currently well controlled.  Continue current medical therapy, Goal LDL <100, ideally less than 70.  Mild hypertriglyceridemia, continue dietary and lifestyle modifications.      Hypertension: Goal less than 130/80, currently well controlled, continue current medical therapy.    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    Paroxysmal atrial fibrillation: Chads vas score equal to 7.  Currently enrolled in [facility], study drug is either aspirin or Eliquis.      Subjective  Report stable functional capacity.  Denies chest pain, palpitations or dyspnea on exertion.  Blood pressures run less than 130/80 mmHg.   No significant orthostasis.  Intermittent mild gait instability.  Still playing golf 2 days a week during the summer with a group of friends.  Denies myalgias on statin therapy.  He is compliant with medical therapy.  Device interrogation was negative for arrhythmia.      PHYSICAL EXAMINATION:  Vitals:    11/09/22 1150   BP: 112/80   BP Location: Left arm   Patient Position: Sitting   Cuff Size: Adult   Pulse: 85   SpO2: 93%   Weight: 121 kg (267 lb)   Height: 198.1 cm (78\")     General Appearance:    Alert, cooperative, no distress, appears stated age   Head:    Normocephalic, without obvious abnormality, atraumatic   Eyes:    conjunctiva/corneas clear   Nose:   Nares normal, septum midline, mucosa normal, no drainage   Throat:   Lips, teeth and gums normal   Neck:   Supple, symmetrical, trachea midline, no carotid    bruit or JVD   Lungs:     Clear to auscultation bilaterally, respirations unlabored   Chest Wall:    No tenderness or deformity    Heart:    Regular rate and rhythm, S1 and S2 normal, no murmur, rub   or gallop, normal carotid impulse bilaterally without bruit.   Abdomen:     Soft, non-tender   Extremities:   Extremities normal, atraumatic, no cyanosis or edema   Pulses:   2+ and symmetric all extremities   Skin:   Skin color, texture, turgor normal, no rashes or " lesions       Diagnostic Data:  Procedures  Lab Results   Component Value Date    CHLPL 152 05/04/2022    TRIG 260 (H) 05/04/2022    HDL 35 (L) 05/04/2022     Lab Results   Component Value Date    GLUCOSE 107 (H) 05/29/2022    BUN 17 05/29/2022    CREATININE 1.12 05/29/2022     05/29/2022    K 3.8 05/29/2022     05/29/2022    CO2 29.0 05/29/2022     Lab Results   Component Value Date    HGBA1C 7.10 (H) 05/04/2022     Lab Results   Component Value Date    WBC 14.47 (H) 05/29/2022    HGB 12.9 (L) 05/29/2022    HCT 36.6 (L) 05/29/2022     05/29/2022       Allergies  Allergies   Allergen Reactions   • Biaxin [Clarithromycin] Nausea Only   • Clindamycin/Lincomycin Other (See Comments)     Caused incontinence - pt states he has taken this w/o any problems   • Levaquin [Levofloxacin] Nausea Only   • Nitroglycerin Other (See Comments)     hypotension   • Penicillins Rash       Current Medications    Current Outpatient Medications:   •  Acetaminophen 500 MG coapsule, Take 1,000 mg by mouth As Needed for Mild Pain ., Disp: , Rfl:   •  albuterol sulfate  (90 Base) MCG/ACT inhaler, Inhale 2 puffs Every 4 (Four) Hours As Needed for Wheezing., Disp: 8 g, Rfl: 0  •  apixaban (ELIQUIS) 5 MG tablet tablet, Take 5 mg by mouth 2 (Two) Times a Day. Currently in a trial and it is eliquis or placebo.  Patient is not sure . Patient will hold for two days before surgery per orders from Dr Ramirez, Disp: , Rfl:   •  aspirin 81 MG EC tablet, Take 81 mg by mouth Daily. Pt in trial - may be a placebo, Disp: , Rfl:   •  atorvastatin (LIPITOR) 40 MG tablet, TAKE 1 TABLET BY MOUTH DAILY, Disp: 90 tablet, Rfl: 0  •  cetirizine (zyrTEC) 10 MG tablet, Take 10 mg by mouth Every Night., Disp: , Rfl:   •  clopidogrel (PLAVIX) 75 MG tablet, TAKE 1 TABLET BY MOUTH DAILY, Disp: 90 tablet, Rfl: 0  •  Cyanocobalamin (Vitamin B-12) 5000 MCG tablet dispersible, Take 1 tablet by mouth Daily., Disp: , Rfl:   •   guaiFENesin-dextromethorphan (ROBITUSSIN DM) 100-10 MG/5ML syrup, Take 5 mL by mouth 3 (Three) Times a Day As Needed for Cough., Disp: 237 mL, Rfl: 0  •  hydroCHLOROthiazide (HYDRODIURIL) 25 MG tablet, TAKE 1/2 TABLET BY MOUTH DAILY, Disp: 45 tablet, Rfl: 0  •  lisinopril (PRINIVIL,ZESTRIL) 10 MG tablet, Take 1 tablet by mouth Every Morning., Disp: 90 tablet, Rfl: 1  •  Lumigan 0.01 % ophthalmic drops, Administer 1 drop to both eyes Every Night., Disp: , Rfl:   •  metoprolol tartrate (LOPRESSOR) 50 MG tablet, TAKE 1 TABLET BY MOUTH EVERY 12 HOURS, Disp: 180 tablet, Rfl: 0  •  Multiple Vitamins-Minerals (MULTIVITAMIN ADULT PO), Take 1 tablet by mouth Daily., Disp: , Rfl:   •  fluticasone (FLONASE) 50 MCG/ACT nasal spray, 2 sprays into the nostril(s) as directed by provider Daily. PRN, Disp: , Rfl:           ROS  Review of Systems   Cardiovascular: Negative for chest pain, dyspnea on exertion, irregular heartbeat and palpitations.   Respiratory: Negative for shortness of breath and sputum production.          SOCIAL HX  Social History     Socioeconomic History   • Marital status:    Tobacco Use   • Smoking status: Never   • Smokeless tobacco: Never   • Tobacco comments:     no passive smoke exposure   Vaping Use   • Vaping Use: Never used   Substance and Sexual Activity   • Alcohol use: Not Currently     Alcohol/week: 2.0 standard drinks     Types: 1 Glasses of wine, 1 Cans of beer per week     Comment: very rarely   • Drug use: No   • Sexual activity: Not Currently     Partners: Female       FAMILY HX  Family History   Problem Relation Age of Onset   • Hepatitis Mother    • Dementia Father    • Stroke Sister    • Hypertension Brother    • Heart attack Paternal Grandfather              Abbe Mendieta III, MD, FACC

## 2022-11-11 ENCOUNTER — LAB (OUTPATIENT)
Dept: LAB | Facility: HOSPITAL | Age: 77
End: 2022-11-11

## 2022-11-11 ENCOUNTER — OFFICE VISIT (OUTPATIENT)
Dept: INTERNAL MEDICINE | Facility: CLINIC | Age: 77
End: 2022-11-11

## 2022-11-11 VITALS
SYSTOLIC BLOOD PRESSURE: 96 MMHG | HEART RATE: 84 BPM | DIASTOLIC BLOOD PRESSURE: 68 MMHG | HEIGHT: 78 IN | TEMPERATURE: 97.5 F | WEIGHT: 265.6 LBS | BODY MASS INDEX: 30.73 KG/M2

## 2022-11-11 DIAGNOSIS — I10 PRIMARY HYPERTENSION: ICD-10-CM

## 2022-11-11 DIAGNOSIS — N18.31 STAGE 3A CHRONIC KIDNEY DISEASE: ICD-10-CM

## 2022-11-11 DIAGNOSIS — E78.2 MIXED HYPERLIPIDEMIA: ICD-10-CM

## 2022-11-11 DIAGNOSIS — E11.21 DIABETIC NEPHROPATHY ASSOCIATED WITH TYPE 2 DIABETES MELLITUS: ICD-10-CM

## 2022-11-11 DIAGNOSIS — E11.21 DIABETIC NEPHROPATHY ASSOCIATED WITH TYPE 2 DIABETES MELLITUS: Primary | ICD-10-CM

## 2022-11-11 PROBLEM — J18.9 CAP (COMMUNITY ACQUIRED PNEUMONIA): Status: RESOLVED | Noted: 2022-05-27 | Resolved: 2022-11-11

## 2022-11-11 PROCEDURE — 99214 OFFICE O/P EST MOD 30 MIN: CPT | Performed by: INTERNAL MEDICINE

## 2022-11-11 NOTE — PROGRESS NOTES
[facility]     Abbe Palaicos  1945   [id number]      Patient Care Team:  Xavier Briones MD as PCP - General  Xavier Briones MD as PCP - Family Medicine  Abbe Mendieta III, MD as Cardiologist (Cardiology)    Chief Complaint::   Chief Complaint   Patient presents with   • Hypertension   • Hyperlipidemia   • Diabetes   • Numbness     Hands at night        HPI  The patient presents today for an annual physical examination.    Positional numbness of distal upper extremities  The patient reports that he has been experiencing paresthesia in his distal upper extremities upon waking up, occasionally affecting him unilaterally, other times, bilaterally. He notes that paresthesia is relieved after 15 to 20 minutes and with constant movement and claims that this is more of an aggravation than a problem.    Hypertension  The patient confirms that his blood pressure is slightly decreased today, 11/11/2022, at 96/68 mmHg, but also notes that he is fasting. He reports that he occasionally feels lightheaded when he gets up from a seated position, but denies syncope. He acknowledges that the medications he is currently taking may have contributed to this, but understands that it is beneficial for his cardiovascular condition.     Atrial fibrillation and coronary artery disease  The patient mentions that the drug study that he participated in will end no later than 08/2023 and that his medications will most likely change. He wonders why he was taking Eliquis along with Plavix.    Diabetes mellitus  The patient states that he had an ophthalmology examination earlier this week. He reports that his prescription was changed, and the doctor encouraged him to do cataract surgery removal, which he has been delaying for years. He reveals that he would think about it and was scheduled for a follow-up next year, in 2023.     Skin lesions  The patient reveals that he recently had skin lesions removed that  suggested basal cell carcinoma. He confirms a history of melanoma for which he had undergone Moh's surgery.    General health maintenance  The patient states he is doing well overall and denies dyspnea. He notes that he is up-to-date on his influenza and COVID-19 vaccination. He also mentions that he was hospitalized last 05/2022 due to pneumonia unrelated to COVID-19 infection    Chronic Conditions:  Hypertension, diabetes mellitus, coronary artery disease, atrial fibrillation      Patient Active Problem List   Diagnosis   • Coronary artery disease   • Peripheral neuropathy   • VF (ventricular fibrillation) (HCC)   • Hypertrophy of prostate without urinary obstruction   • Diabetic polyneuropathy associated with type 2 diabetes mellitus (HCC)   • Obesity   • Mixed hyperlipidemia   • Primary hypertension   • Seasonal allergic rhinitis   • Diabetic nephropathy associated with type 2 diabetes mellitus (HCC)   • Chronic kidney disease, stage III (moderate) (HCC)   • Malignant melanoma of overlapping sites (HCC)   • Acute sepsis (HCC)        Past Medical History:   Diagnosis Date   • Benign hypertension    • Cancer (HCC) Basal cell    skin    • CAP (community acquired pneumonia) 5/27/2022   • Cataracts, both eyes     needs surgery    • Chronic systolic (congestive) heart failure (HCC)    • Coronary artery disease 2007, 2013    MI x2; 6 stents    • Diabetic nephropathy associated with type 2 diabetes mellitus (HCC) 10/27/2019   • Diabetic peripheral angiopathy (HCC)    • Diabetic polyneuropathy associated with type 2 diabetes mellitus (HCC)    • Glaucoma    • History of COVID-19 01/2020    no hospitalizaiton    • Hyperlipidemia    • Implantable cardioverter-defibrillator (ICD) generator end of life 01/01/2014    Status post ICD generator change   • Melanoma (HCC)    • Myocardial infarction (HCC) 2013    NSTEMI   • Osteoarthritis of left hip    • Peripheral neuropathy    • Stroke (HCC)     PONTINE STROKE   • Ventricular  tachycardia    • Wears eyeglasses        Past Surgical History:   Procedure Laterality Date   • APPENDECTOMY  1979   • CARDIAC CATHETERIZATION     • CARDIAC DEFIBRILLATOR PLACEMENT  2007   • CARDIAC ELECTROPHYSIOLOGY PROCEDURE N/A 10/06/2021    Procedure: ICD DC generator change/medtronic 2-4 weeks/Hold eliquis 3 days per Dr Dickens;  Surgeon: Alexis Dickens MD;  Location:  [facility];  Service: Cardiology;  Laterality: N/A;   • CARDIAC PACEMAKER PLACEMENT  2007    dual chamber   • COLONOSCOPY  2013   • CORONARY STENT PLACEMENT  2007    elective stents to LAD, CIRC   • CORONARY STENT PLACEMENT  2007    emergent L heart cath with stents RCA    • CORONARY STENT PLACEMENT      2013?  stents to diagonal and ramus   • HEAD/NECK LESION/CYST EXCISION Left 03/07/2022    Procedure: RE-EXCISION MELANOMA IN SITU LEFT SUPRACLAVICULAR FOSSA;  Surgeon: Sathish Phillips MD;  Location: [facility] OR;  Service: General;  Laterality: Left;   • HIP SURGERY Left 01/16/2018    total left hip arthroplasty, anterior approach   • JOINT REPLACEMENT Left     hip   • MOUTH SURGERY  04/25/2022   • SQUAMOUS CELL CARCINOMA EXCISION     • TONSILLECTOMY     • WISDOM TOOTH EXTRACTION         Family History   Problem Relation Age of Onset   • Hepatitis Mother    • Dementia Father    • Stroke Sister    • Hypertension Brother    • Heart attack Paternal Grandfather        Social History     Socioeconomic History   • Marital status:    Tobacco Use   • Smoking status: Never   • Smokeless tobacco: Never   • Tobacco comments:     no passive smoke exposure   Vaping Use   • Vaping Use: Never used   Substance and Sexual Activity   • Alcohol use: Not Currently     Alcohol/week: 2.0 standard drinks     Types: 1 Glasses of wine, 1 Cans of beer per week     Comment: very rarely   • Drug use: No   • Sexual activity: Not Currently     Partners: Female       Allergies   Allergen Reactions   • Biaxin [Clarithromycin] Nausea Only   •  Clindamycin/Lincomycin Other (See Comments)     Caused incontinence - pt states he has taken this w/o any problems   • Levaquin [Levofloxacin] Nausea Only   • Nitroglycerin Other (See Comments)     hypotension   • Penicillins Rash         Current Outpatient Medications:   •  Acetaminophen 500 MG coapsule, Take 1,000 mg by mouth As Needed for Mild Pain ., Disp: , Rfl:   •  apixaban (ELIQUIS) 5 MG tablet tablet, Take 5 mg by mouth 2 (Two) Times a Day. Currently in a trial and it is eliquis or placebo.  Patient is not sure . Patient will hold for two days before surgery per orders from Dr Ramirez, Disp: , Rfl:   •  aspirin 81 MG EC tablet, Take 81 mg by mouth Daily. Pt in trial - may be a placebo, Disp: , Rfl:   •  atorvastatin (LIPITOR) 40 MG tablet, TAKE 1 TABLET BY MOUTH DAILY, Disp: 90 tablet, Rfl: 0  •  cetirizine (zyrTEC) 10 MG tablet, Take 10 mg by mouth Every Night., Disp: , Rfl:   •  clopidogrel (PLAVIX) 75 MG tablet, TAKE 1 TABLET BY MOUTH DAILY, Disp: 90 tablet, Rfl: 0  •  Cyanocobalamin (Vitamin B-12) 5000 MCG tablet dispersible, Take 1 tablet by mouth Daily., Disp: , Rfl:   •  guaiFENesin-dextromethorphan (ROBITUSSIN DM) 100-10 MG/5ML syrup, Take 5 mL by mouth 3 (Three) Times a Day As Needed for Cough., Disp: 237 mL, Rfl: 0  •  hydroCHLOROthiazide (HYDRODIURIL) 25 MG tablet, TAKE 1/2 TABLET BY MOUTH DAILY, Disp: 45 tablet, Rfl: 0  •  lisinopril (PRINIVIL,ZESTRIL) 10 MG tablet, Take 1 tablet by mouth Every Morning., Disp: 90 tablet, Rfl: 1  •  Lumigan 0.01 % ophthalmic drops, Administer 1 drop to both eyes Every Night., Disp: , Rfl:   •  metoprolol tartrate (LOPRESSOR) 50 MG tablet, TAKE 1 TABLET BY MOUTH EVERY 12 HOURS, Disp: 180 tablet, Rfl: 0  •  Multiple Vitamins-Minerals (MULTIVITAMIN ADULT PO), Take 1 tablet by mouth Daily., Disp: , Rfl:     Review of Systems   Constitutional: Negative.    Respiratory: Negative.  Negative for chest tightness and shortness of breath.    Cardiovascular: Negative.   "Negative for chest pain.   Gastrointestinal: Negative for abdominal pain, blood in stool, constipation and diarrhea.        Vital Signs  Vitals:    11/11/22 0844   BP: 96/68   BP Location: Left arm   Patient Position: Sitting   Cuff Size: Large Adult   Pulse: 84   Temp: 97.5 °F (36.4 °C)   Weight: 120 kg (265 lb 9.6 oz)   Height: 198.1 cm (77.99\")   PainSc: 0-No pain       Physical Exam  Vitals and nursing note reviewed.   Constitutional:       General: He is not in acute distress.     Appearance: He is well-developed. He is not diaphoretic.   HENT:      Head: Normocephalic and atraumatic.   Eyes:      Conjunctiva/sclera: Conjunctivae normal.   Cardiovascular:      Rate and Rhythm: Normal rate and regular rhythm.      Heart sounds: Normal heart sounds. No murmur heard.  Pulmonary:      Effort: Pulmonary effort is normal. No respiratory distress.      Breath sounds: Normal breath sounds.   Skin:     General: Skin is warm.      Capillary Refill: Capillary refill takes less than 2 seconds.   Neurological:      Mental Status: He is alert and oriented to person, place, and time.          Procedures    ACE III MINI             Assessment/Plan:    Diagnoses and all orders for this visit:    1. Diabetic nephropathy associated with type 2 diabetes mellitus (HCC) (Primary)  -     Comprehensive Metabolic Panel; Future  -     Hemoglobin A1c; Future  -     Microalbumin / Creatinine Urine Ratio - Urine, Clean Catch; Future    2. Stage 3a chronic kidney disease (HCC)    3. Primary hypertension  -     CBC & Differential; Future    4. Mixed hyperlipidemia  -     Lipid Panel; Future        Diabetes mellitus.  His hemoglobin A1c is pending. An ophthalmology examination was done earlier this week. He will continue working on a healthy diet, which thus far has been adequate for controlling his blood glucose levels.    Chronic kidney disease.  His glomerular filtration rate is pending. The treatment remains to control blood glucose and " blood pressure, and avoid nonsteroidal anti-inflammatory drugs.    Hypertension.  His blood pressure is slightly decreased today, but he is fasting. Earlier this week at cardiology, it was 112/80 mmHg. He will continue hydrochlorothiazide, lisinopril, and metoprolol.    Hyperlipidemia.  His lipid panel is pending. He will continue atorvastatin and a healthy diet.    Atrial fibrillation and coronary artery disease.  This is per cardiology.    Positional numbness in his upper extremities.  An explanation was given to him that this was mild nerve compression. He could try neck stretches, and if it became more severe, physical therapy could be done, but he certainly would not entertain any more aggressive treatment.  Plan of care reviewed with patient at the conclusion of today's visit. Education was provided regarding diagnosis, management, and any prescribed or recommended OTC medications.Patient verbalizes understanding of and agreement with management plan.         Transcribed from ambient dictation for Xavier Briones MD by Primo Odell.  11/11/22   11:05 EST    Patient or patient representative verbalized consent to the visit recording.  I have personally performed the services described in this document as transcribed by the above individual, and it is both accurate and complete.

## 2022-11-12 LAB
ALBUMIN SERPL-MCNC: 5 G/DL (ref 3.5–5.2)
ALBUMIN/CREAT UR: 8 MG/G CREAT (ref 0–29)
ALBUMIN/GLOB SERPL: 2.2 G/DL
ALP SERPL-CCNC: 81 U/L (ref 39–117)
ALT SERPL-CCNC: 21 U/L (ref 1–41)
AST SERPL-CCNC: 32 U/L (ref 1–40)
BASOPHILS # BLD AUTO: 0.04 10*3/MM3 (ref 0–0.2)
BASOPHILS NFR BLD AUTO: 0.4 % (ref 0–1.5)
BILIRUB SERPL-MCNC: 0.7 MG/DL (ref 0–1.2)
BUN SERPL-MCNC: 18 MG/DL (ref 8–23)
BUN/CREAT SERPL: 14.4 (ref 7–25)
CALCIUM SERPL-MCNC: 9.8 MG/DL (ref 8.6–10.5)
CHLORIDE SERPL-SCNC: 102 MMOL/L (ref 98–107)
CHOLEST SERPL-MCNC: 164 MG/DL (ref 0–200)
CO2 SERPL-SCNC: 30 MMOL/L (ref 22–29)
CREAT SERPL-MCNC: 1.25 MG/DL (ref 0.76–1.27)
CREAT UR-MCNC: 200.6 MG/DL
EGFRCR SERPLBLD CKD-EPI 2021: 58.6 ML/MIN/1.73
EOSINOPHIL # BLD AUTO: 0.33 10*3/MM3 (ref 0–0.4)
EOSINOPHIL NFR BLD AUTO: 3.4 % (ref 0.3–6.2)
ERYTHROCYTE [DISTWIDTH] IN BLOOD BY AUTOMATED COUNT: 12.6 % (ref 12.3–15.4)
GLOBULIN SER CALC-MCNC: 2.3 GM/DL
GLUCOSE SERPL-MCNC: 120 MG/DL (ref 65–99)
HBA1C MFR BLD: 6.6 % (ref 4.8–5.6)
HCT VFR BLD AUTO: 45.9 % (ref 37.5–51)
HDLC SERPL-MCNC: 44 MG/DL (ref 40–60)
HGB BLD-MCNC: 15.9 G/DL (ref 13–17.7)
IMM GRANULOCYTES # BLD AUTO: 0.03 10*3/MM3 (ref 0–0.05)
IMM GRANULOCYTES NFR BLD AUTO: 0.3 % (ref 0–0.5)
LDLC SERPL CALC-MCNC: 89 MG/DL (ref 0–100)
LYMPHOCYTES # BLD AUTO: 2.89 10*3/MM3 (ref 0.7–3.1)
LYMPHOCYTES NFR BLD AUTO: 29.8 % (ref 19.6–45.3)
MCH RBC QN AUTO: 33.4 PG (ref 26.6–33)
MCHC RBC AUTO-ENTMCNC: 34.6 G/DL (ref 31.5–35.7)
MCV RBC AUTO: 96.4 FL (ref 79–97)
MICROALBUMIN UR-MCNC: 16.8 UG/ML
MONOCYTES # BLD AUTO: 0.77 10*3/MM3 (ref 0.1–0.9)
MONOCYTES NFR BLD AUTO: 7.9 % (ref 5–12)
NEUTROPHILS # BLD AUTO: 5.65 10*3/MM3 (ref 1.7–7)
NEUTROPHILS NFR BLD AUTO: 58.2 % (ref 42.7–76)
NRBC BLD AUTO-RTO: 0 /100 WBC (ref 0–0.2)
PLATELET # BLD AUTO: 263 10*3/MM3 (ref 140–450)
POTASSIUM SERPL-SCNC: 4.6 MMOL/L (ref 3.5–5.2)
PROT SERPL-MCNC: 7.3 G/DL (ref 6–8.5)
RBC # BLD AUTO: 4.76 10*6/MM3 (ref 4.14–5.8)
SODIUM SERPL-SCNC: 143 MMOL/L (ref 136–145)
TRIGL SERPL-MCNC: 178 MG/DL (ref 0–150)
VLDLC SERPL CALC-MCNC: 31 MG/DL (ref 5–40)
WBC # BLD AUTO: 9.71 10*3/MM3 (ref 3.4–10.8)

## 2022-12-14 RX ORDER — ATORVASTATIN CALCIUM 40 MG/1
40 TABLET, FILM COATED ORAL DAILY
Qty: 90 TABLET | Refills: 1 | Status: SHIPPED | OUTPATIENT
Start: 2022-12-14

## 2022-12-14 RX ORDER — LISINOPRIL 10 MG/1
10 TABLET ORAL EVERY MORNING
Qty: 90 TABLET | Refills: 1 | Status: SHIPPED | OUTPATIENT
Start: 2022-12-14

## 2022-12-14 RX ORDER — HYDROCHLOROTHIAZIDE 25 MG/1
12.5 TABLET ORAL DAILY
Qty: 45 TABLET | Refills: 1 | Status: SHIPPED | OUTPATIENT
Start: 2022-12-14

## 2022-12-24 PROCEDURE — 93295 DEV INTERROG REMOTE 1/2/MLT: CPT | Performed by: INTERNAL MEDICINE

## 2022-12-24 PROCEDURE — 93296 REM INTERROG EVL PM/IDS: CPT | Performed by: INTERNAL MEDICINE

## 2023-01-03 PROCEDURE — 93297 REM INTERROG DEV EVAL ICPMS: CPT | Performed by: INTERNAL MEDICINE

## 2023-01-03 PROCEDURE — G2066 INTER DEVC REMOTE 30D: HCPCS | Performed by: INTERNAL MEDICINE

## 2023-03-03 ENCOUNTER — TELEPHONE (OUTPATIENT)
Dept: CARDIOLOGY | Facility: CLINIC | Age: 78
End: 2023-03-03
Payer: MEDICARE

## 2023-03-03 NOTE — TELEPHONE ENCOUNTER
[place] at  office is wanting to know how long to hold his Eliquis. He is scheduled on 3/7/23  for work on his implant that will require an incision. Please advise.

## 2023-03-14 RX ORDER — CLOPIDOGREL BISULFATE 75 MG/1
75 TABLET ORAL DAILY
Qty: 90 TABLET | Refills: 0 | Status: SHIPPED | OUTPATIENT
Start: 2023-03-14

## 2023-03-14 RX ORDER — METOPROLOL TARTRATE 50 MG/1
50 TABLET, FILM COATED ORAL EVERY 12 HOURS
Qty: 180 TABLET | Refills: 0 | Status: SHIPPED | OUTPATIENT
Start: 2023-03-14

## 2023-05-01 ENCOUNTER — OFFICE VISIT (OUTPATIENT)
Dept: CARDIOLOGY | Facility: CLINIC | Age: 78
End: 2023-05-01
Payer: MEDICARE

## 2023-05-01 VITALS
SYSTOLIC BLOOD PRESSURE: 90 MMHG | WEIGHT: 272.8 LBS | HEART RATE: 77 BPM | BODY MASS INDEX: 31.56 KG/M2 | HEIGHT: 78 IN | DIASTOLIC BLOOD PRESSURE: 60 MMHG | OXYGEN SATURATION: 97 %

## 2023-05-01 DIAGNOSIS — I10 PRIMARY HYPERTENSION: ICD-10-CM

## 2023-05-01 DIAGNOSIS — I25.10 CORONARY ARTERY DISEASE INVOLVING NATIVE CORONARY ARTERY OF NATIVE HEART WITHOUT ANGINA PECTORIS: Primary | ICD-10-CM

## 2023-05-01 DIAGNOSIS — E78.2 MIXED HYPERLIPIDEMIA: ICD-10-CM

## 2023-05-01 RX ORDER — LISINOPRIL 5 MG/1
5 TABLET ORAL DAILY
Qty: 90 TABLET | Refills: 3 | Status: SHIPPED | OUTPATIENT
Start: 2023-05-01

## 2023-05-01 NOTE — PROGRESS NOTES
[facility]  Office visit  Abbe Palacios  1945  [phone]    VISIT DATE:  5/1/2023      PCP: Xavier Briones MD  [address] [address]    CC:  Chief Complaint   Patient presents with   • Coronary Artery Disease     6 month follow up        PROBLEM LIST:  1.  Coronary artery disease:  a.  Status post stenting of right coronary artery, bare-metal stent for acute myocardial infarction, 11/07/2007.  b. Status post elective stenting of proximal left anterior descending coronary artery, 11/14/2007.  c. Elina-infarction VF and post-infarction  ventricular ectopy with positive EP study culminating in dual-chamber ICD implantation, November 2007.  d. Recurrent chest pain with non-STEMI, status post cardiac catheterization, 01/24/2013, Dr. Jarrett:  PTCA and stenting of the first diagonal using  a 2.5 x 24 mm Promus drug-eluting stent, PTCA and stenting of the ramus intermedius using a 2.25 x 20 mm Promus drug-eluting stent.  2. Benign hypertension.  3. Hyperlipidemia.  4. Peripheral neuropathy.  5. Status post ICD generator change,  January 2014.     ASSESSMENT:   Diagnosis Plan   1. Coronary artery disease involving native coronary artery of native heart without angina pectoris        2. Mixed hyperlipidemia        3. Primary hypertension          Device interrogation:  Medtronic dual-chamber ICD  Normal device interrogation, see scanned sheet for details  Not dependent      PLAN:  Coronary artery disease: Stable and asymptomatic. continue Plavix.  Continue high intensity statin therapy and current afterload reduction    Elina-infarct V. fib status post ICD implantation: Continue beta-blockade.    Hyperlipidemia: Currently well controlled.  Continue current medical therapy, Goal LDL <100, ideally less than 70.  Mild hypertriglyceridemia, continue dietary and lifestyle modifications.      Hypertension: Goal less than 130/80, intermittent symptomatic  "hypotension.  Decreasing lisinopril to 5 mg p.o. daily and discussed moving to bedtime dosing.    Lencho bravo thanks    History of old pontine stroke: Continue Plavix, statin and afterload reduction.    Paroxysmal atrial fibrillation: Chads vas score equal to 7.  Currently enrolled in [place], study drug is either aspirin or Eliquis.      Subjective  Diabetic, managing with diet and exercise.  Report stable functional capacity.  Denies chest pain, palpitations or dyspnea on exertion.  Intermittent relative symptomatic hypotension with fatigue and mild orthostasis..  Intermittent mild gait instability.  Still playing golf 2 days a week during the summer with a group of friends.  Denies myalgias on statin therapy.  He is compliant with medical therapy.  Device interrogation was negative for arrhythmia.      PHYSICAL EXAMINATION:  Vitals:    05/01/23 0908   BP: 90/60   BP Location: Left arm   Patient Position: Sitting   Cuff Size: Adult   Pulse: 77   SpO2: 97%   Weight: 124 kg (272 lb 12.8 oz)   Height: 198.1 cm (78\")     General Appearance:    Alert, cooperative, no distress, appears stated age   Head:    Normocephalic, without obvious abnormality, atraumatic   Eyes:    conjunctiva/corneas clear   Nose:   Nares normal, septum midline, mucosa normal, no drainage   Throat:   Lips, teeth and gums normal   Neck:   Supple, symmetrical, trachea midline, no carotid    bruit or JVD   Lungs:     Clear to auscultation bilaterally, respirations unlabored   Chest Wall:    No tenderness or deformity    Heart:    Regular rate and rhythm, S1 and S2 normal, no murmur, rub   or gallop, normal carotid impulse bilaterally without bruit.   Abdomen:     Soft, non-tender   Extremities:   Extremities normal, atraumatic, no cyanosis or edema   Pulses:   2+ and symmetric all extremities   Skin:   Skin color, texture, turgor normal, no rashes or lesions       Diagnostic Data:  Procedures  Lab Results   Component Value Date    CHLPL 164 " 11/11/2022    TRIG 178 (H) 11/11/2022    HDL 44 11/11/2022     Lab Results   Component Value Date    GLUCOSE 120 (H) 11/11/2022    BUN 18 11/11/2022    CREATININE 1.25 11/11/2022     11/11/2022    K 4.6 11/11/2022     11/11/2022    CO2 30.0 (H) 11/11/2022     Lab Results   Component Value Date    HGBA1C 6.60 (H) 11/11/2022     Lab Results   Component Value Date    WBC 9.71 11/11/2022    HGB 15.9 11/11/2022    HCT 45.9 11/11/2022     11/11/2022       Allergies  Allergies   Allergen Reactions   • Biaxin [Clarithromycin] Nausea Only   • Clindamycin/Lincomycin Other (See Comments)     Caused incontinence - pt states he has taken this w/o any problems   • Levaquin [Levofloxacin] Nausea Only   • Nitroglycerin Other (See Comments)     hypotension   • Penicillins Rash       Current Medications    Current Outpatient Medications:   •  Acetaminophen 500 MG coapsule, Take 2 capsules by mouth As Needed for Mild Pain., Disp: , Rfl:   •  apixaban (ELIQUIS) 5 MG tablet tablet, Take 1 tablet by mouth 2 (Two) Times a Day. Currently in a trial and it is eliquis or placebo.  Patient is not sure . Patient will hold for two days before surgery per orders from Dr Ramirez, Disp: , Rfl:   •  aspirin 81 MG EC tablet, Take 1 tablet by mouth Daily. Pt in trial - may be a placebo, Disp: , Rfl:   •  atorvastatin (LIPITOR) 40 MG tablet, Take 1 tablet by mouth Daily., Disp: 90 tablet, Rfl: 1  •  cetirizine (zyrTEC) 10 MG tablet, Take 1 tablet by mouth Every Night., Disp: , Rfl:   •  clopidogrel (PLAVIX) 75 MG tablet, Take 1 tablet by mouth Daily., Disp: 90 tablet, Rfl: 0  •  Cyanocobalamin (Vitamin B-12) 5000 MCG tablet dispersible, Take 1 tablet by mouth Daily., Disp: , Rfl:   •  guaiFENesin-dextromethorphan (ROBITUSSIN DM) 100-10 MG/5ML syrup, Take 5 mL by mouth 3 (Three) Times a Day As Needed for Cough., Disp: 237 mL, Rfl: 0  •  hydroCHLOROthiazide (HYDRODIURIL) 25 MG tablet, Take 0.5 tablets by mouth Daily., Disp: 45 tablet,  Rfl: 1  •  Lumigan 0.01 % ophthalmic drops, Administer 1 drop to both eyes Every Night., Disp: , Rfl:   •  metoprolol tartrate (LOPRESSOR) 50 MG tablet, Take 1 tablet by mouth Every 12 (Twelve) Hours., Disp: 180 tablet, Rfl: 0  •  Multiple Vitamins-Minerals (MULTIVITAMIN ADULT PO), Take 1 tablet by mouth Daily., Disp: , Rfl:   •  lisinopril (PRINIVIL,ZESTRIL) 5 MG tablet, Take 1 tablet by mouth Daily., Disp: 90 tablet, Rfl: 3          ROS  Review of Systems   Cardiovascular: Negative for chest pain, dyspnea on exertion, irregular heartbeat and palpitations.   Respiratory: Negative for shortness of breath and sputum production.          SOCIAL HX  Social History     Socioeconomic History   • Marital status:    Tobacco Use   • Smoking status: Never   • Smokeless tobacco: Never   • Tobacco comments:     no passive smoke exposure   Vaping Use   • Vaping Use: Never used   Substance and Sexual Activity   • Alcohol use: Not Currently     Alcohol/week: 2.0 standard drinks     Types: 1 Glasses of wine, 1 Cans of beer per week     Comment: very rarely   • Drug use: No   • Sexual activity: Not Currently     Partners: Female       FAMILY HX  Family History   Problem Relation Age of Onset   • Hepatitis Mother    • Dementia Father    • Stroke Sister    • Hypertension Brother    • Heart attack Paternal Grandfather              Abbe Mendieta III, MD, FACC

## 2023-05-12 ENCOUNTER — OFFICE VISIT (OUTPATIENT)
Dept: INTERNAL MEDICINE | Facility: CLINIC | Age: 78
End: 2023-05-12
Payer: MEDICARE

## 2023-05-12 ENCOUNTER — LAB (OUTPATIENT)
Dept: LAB | Facility: HOSPITAL | Age: 78
End: 2023-05-12
Payer: MEDICARE

## 2023-05-12 VITALS
HEART RATE: 72 BPM | TEMPERATURE: 96.9 F | SYSTOLIC BLOOD PRESSURE: 116 MMHG | BODY MASS INDEX: 31.45 KG/M2 | HEIGHT: 78 IN | DIASTOLIC BLOOD PRESSURE: 82 MMHG | WEIGHT: 271.8 LBS

## 2023-05-12 DIAGNOSIS — E11.42 DIABETIC POLYNEUROPATHY ASSOCIATED WITH TYPE 2 DIABETES MELLITUS: ICD-10-CM

## 2023-05-12 DIAGNOSIS — N18.31 STAGE 3A CHRONIC KIDNEY DISEASE: ICD-10-CM

## 2023-05-12 DIAGNOSIS — E11.21 DIABETIC NEPHROPATHY ASSOCIATED WITH TYPE 2 DIABETES MELLITUS: ICD-10-CM

## 2023-05-12 DIAGNOSIS — E78.2 MIXED HYPERLIPIDEMIA: ICD-10-CM

## 2023-05-12 DIAGNOSIS — Z00.00 PREVENTATIVE HEALTH CARE: Primary | ICD-10-CM

## 2023-05-12 DIAGNOSIS — I10 PRIMARY HYPERTENSION: ICD-10-CM

## 2023-05-12 LAB
ALBUMIN SERPL-MCNC: 4.6 G/DL (ref 3.5–5.2)
ALBUMIN/GLOB SERPL: 2 G/DL
ALP SERPL-CCNC: 75 U/L (ref 39–117)
ALT SERPL-CCNC: 26 U/L (ref 1–41)
AST SERPL-CCNC: 31 U/L (ref 1–40)
BILIRUB SERPL-MCNC: 0.6 MG/DL (ref 0–1.2)
BUN SERPL-MCNC: 21 MG/DL (ref 8–23)
BUN/CREAT SERPL: 15.1 (ref 7–25)
CALCIUM SERPL-MCNC: 10 MG/DL (ref 8.6–10.5)
CHLORIDE SERPL-SCNC: 104 MMOL/L (ref 98–107)
CHOLEST SERPL-MCNC: 157 MG/DL (ref 0–200)
CO2 SERPL-SCNC: 29.3 MMOL/L (ref 22–29)
CREAT SERPL-MCNC: 1.39 MG/DL (ref 0.76–1.27)
EGFRCR SERPLBLD CKD-EPI 2021: 51.6 ML/MIN/1.73
GLOBULIN SER CALC-MCNC: 2.3 GM/DL
GLUCOSE SERPL-MCNC: 141 MG/DL (ref 65–99)
HBA1C MFR BLD: 7 % (ref 4.8–5.6)
HDLC SERPL-MCNC: 40 MG/DL (ref 40–60)
LDLC SERPL CALC-MCNC: 81 MG/DL (ref 0–100)
POTASSIUM SERPL-SCNC: 4.7 MMOL/L (ref 3.5–5.2)
PROT SERPL-MCNC: 6.9 G/DL (ref 6–8.5)
SODIUM SERPL-SCNC: 142 MMOL/L (ref 136–145)
TRIGL SERPL-MCNC: 218 MG/DL (ref 0–150)
VLDLC SERPL CALC-MCNC: 36 MG/DL (ref 5–40)

## 2023-05-12 RX ORDER — CLINDAMYCIN HYDROCHLORIDE 150 MG/1
1 CAPSULE ORAL 3 TIMES DAILY
COMMUNITY
Start: 2023-05-08

## 2023-05-12 NOTE — PROGRESS NOTES
QUICK REFERENCE INFORMATION:  The ABCs of the Annual Wellness Visit    Subsequent Medicare Wellness Visit    HEALTH RISK ASSESSMENT    1945    Recent Hospitalizations:  Recently treated at the following:  [facility].        Current Medical Providers:  Patient Care Team:  Xavier Briones MD as PCP - General  Xavier Briones MD as PCP - Family Medicine  Abbe Mendieta III, MD as Cardiologist (Cardiology)        Smoking Status:  Social History     Tobacco Use   Smoking Status Never   Smokeless Tobacco Never   Tobacco Comments    no passive smoke exposure       Alcohol Consumption:  Social History     Substance and Sexual Activity   Alcohol Use Not Currently   • Alcohol/week: 2.0 standard drinks   • Types: 1 Glasses of wine, 1 Cans of beer per week    Comment: very rarely       Depression Screen:       2023     8:13 AM   PHQ-2/PHQ-9 Depression Screening   Little Interest or Pleasure in Doing Things 0-->not at all   Feeling Down, Depressed or Hopeless 1-->several days   PHQ-9: Brief Depression Severity Measure Score 1       Health Habits and Functional and Cognitive Screenin/12/2023     8:12 AM   Functional & Cognitive Status   Do you have difficulty preparing food and eating? No   Do you have difficulty bathing yourself, getting dressed or grooming yourself? No   Do you have difficulty using the toilet? No   Do you have difficulty moving around from place to place? No   Do you have trouble with steps or getting out of a bed or a chair? No   Current Diet Well Balanced Diet   Dental Exam Up to date   Eye Exam Up to date   Exercise (times per week) 4 times per week        Exercise Frequency Comment 3-4   Current Exercises Include Walking        Exercise Comment golf   Do you need help using the phone?  No   Are you deaf or do you have serious difficulty hearing?  No   Do you need help with transportation? No   Do you need help shopping? No   Do you need help preparing meals?   No   Do you need help with housework?  No   Do you need help with laundry? No   Do you need help taking your medications? No   Do you need help managing money? No   Do you ever drive or ride in a car without wearing a seat belt? No   Have you felt unusual stress, anger or loneliness in the last month? No   Who do you live with? Spouse   If you need help, do you have trouble finding someone available to you? No   Have you been bothered in the last four weeks by sexual problems? No   Do you have difficulty concentrating, remembering or making decisions? No       Fall Risk Screen:  MIK Fall Risk Assessment was completed, and patient is at MODERATE risk for falls. Assessment completed on:5/12/2023    ACE III MINI        Does the patient have evidence of cognitive impairment? No    Aspirin use counseling: Taking ASA appropriately as indicated    Recent Lab Results:  CMP:  Lab Results   Component Value Date    BUN 18 11/11/2022    CREATININE 1.25 11/11/2022    EGFRIFNONA 57 (L) 01/03/2022    EGFRIFAFRI 66 04/21/2021    BCR 14.4 11/11/2022     11/11/2022    K 4.6 11/11/2022    CO2 30.0 (H) 11/11/2022    CALCIUM 9.8 11/11/2022    PROTENTOTREF 7.3 11/11/2022    ALBUMIN 5.00 11/11/2022    LABGLOBREF 2.3 11/11/2022    LABIL2 2.2 11/11/2022    BILITOT 0.7 11/11/2022    ALKPHOS 81 11/11/2022    AST 32 11/11/2022    ALT 21 11/11/2022     HbA1c:  Lab Results   Component Value Date    HGBA1C 6.60 (H) 11/11/2022    HGBA1C 7.10 (H) 05/04/2022     Microalbumin:  Lab Results   Component Value Date    MICROALBUR 16.8 11/11/2022     Lipid Panel  Lab Results   Component Value Date    TRIG 178 (H) 11/11/2022    HDL 44 11/11/2022    LDL 89 11/11/2022    AST 32 11/11/2022    ALT 21 11/11/2022       Visual Acuity:  No results found.    Age-appropriate Screening Schedule:  Refer to the list below for future screening recommendations based on patient's age, sex and/or medical conditions. Orders for these recommended tests are listed in  the plan section. The patient has been provided with a written plan.    Health Maintenance   Topic Date Due   • COLORECTAL CANCER SCREENING  12/02/2021   • TDAP/TD VACCINES (2 - Td or Tdap) 09/27/2022   • ANNUAL WELLNESS VISIT  10/29/2022   • HEMOGLOBIN A1C  05/11/2023   • INFLUENZA VACCINE  08/01/2023   • DIABETIC EYE EXAM  11/08/2023   • LIPID PANEL  11/11/2023   • URINE MICROALBUMIN  11/11/2023   • HEPATITIS C SCREENING  Completed   • COVID-19 Vaccine  Completed   • Pneumococcal Vaccine 65+  Completed   • ZOSTER VACCINE  Completed        Subjective   History of Present Illness    Abbe Palacios is a 77 y.o. male who presents for a Subsequent Wellness Visit and for follow-up of diabetes, chronic kidney disease, hypertension, and hyperlipidemia. He continues to see cardiology for coronary artery disease.    Joint stiffness  The patient states that he is able to play golf once this week. He states that he still enjoys it. He states that he tries to park away from where the ball is so he can get a little bit of walking in. He states that he has joint stiffness primarily. He states that his breathing is good. It has been a year since he had pneumonia. He states that he is fasting today.    Hypertension  He states that his lisinopril was cut in half. He states that his blood pressure was 90 and 96 mmHg. He states that when he talked to Dr. Mendieta, he told him that he had times when he feels like it is lightheaded. He states that he has been off of it for about a week and a half.    Neuropathy  He states that he gets numbness in his feet at night. He states that when he gets up, he can move his feet around. He states that he feels a little tingling. He states that he notices it more at night.    Weight loss  He states that he has lost some weight.    Hand pain  He states that he still has a problem with his hands. He states that right now they do not feel too bad.    CHRONIC CONDITIONS    The following portions of the  patient's history were reviewed and updated as appropriate: allergies, current medications, past family history, past medical history, past social history, past surgical history and problem list.    Outpatient Medications Prior to Visit   Medication Sig Dispense Refill   • Acetaminophen 500 MG coapsule Take 2 capsules by mouth As Needed for Mild Pain.     • apixaban (ELIQUIS) 5 MG tablet tablet Take 1 tablet by mouth 2 (Two) Times a Day. Currently in a trial and it is eliquis or placebo.  Patient is not sure . Patient will hold for two days before surgery per orders from Dr Ramirez     • aspirin 81 MG EC tablet Take 1 tablet by mouth Daily. Pt in trial - may be a placebo     • atorvastatin (LIPITOR) 40 MG tablet Take 1 tablet by mouth Daily. 90 tablet 1   • cetirizine (zyrTEC) 10 MG tablet Take 1 tablet by mouth Every Night.     • clindamycin (CLEOCIN) 150 MG capsule Take 1 capsule by mouth 3 (Three) Times a Day.     • clopidogrel (PLAVIX) 75 MG tablet Take 1 tablet by mouth Daily. 90 tablet 0   • Cyanocobalamin (Vitamin B-12) 5000 MCG tablet dispersible Take 1 tablet by mouth Daily.     • guaiFENesin-dextromethorphan (ROBITUSSIN DM) 100-10 MG/5ML syrup Take 5 mL by mouth 3 (Three) Times a Day As Needed for Cough. 237 mL 0   • hydroCHLOROthiazide (HYDRODIURIL) 25 MG tablet Take 0.5 tablets by mouth Daily. 45 tablet 1   • lisinopril (PRINIVIL,ZESTRIL) 5 MG tablet Take 1 tablet by mouth Daily. 90 tablet 3   • Lumigan 0.01 % ophthalmic drops Administer 1 drop to both eyes Every Night.     • metoprolol tartrate (LOPRESSOR) 50 MG tablet Take 1 tablet by mouth Every 12 (Twelve) Hours. 180 tablet 0   • Multiple Vitamins-Minerals (MULTIVITAMIN ADULT PO) Take 1 tablet by mouth Daily.       No facility-administered medications prior to visit.       Patient Active Problem List   Diagnosis   • Coronary artery disease   • Peripheral neuropathy   • VF (ventricular fibrillation) (HCC)   • Hypertrophy of prostate without urinary  obstruction   • Diabetic polyneuropathy associated with type 2 diabetes mellitus (HCC)   • Obesity   • Mixed hyperlipidemia   • Primary hypertension   • Seasonal allergic rhinitis   • Diabetic nephropathy associated with type 2 diabetes mellitus (HCC)   • Chronic kidney disease, stage III (moderate)   • Malignant melanoma of overlapping sites   • Acute sepsis       Advance Care Planning:  ACP discussion was held with the patient during this visit. Patient has an advance directive (not in EMR), copy requested.    Identification of Risk Factors:  Risk factors include: Advance Directive Discussion.    Review of Systems  Review of systems is otherwise unremarkable.    Compared to one year ago, the patient feels his physical health is the same.  Compared to one year ago, the patient feels his mental health is the same.    Objective     Physical Exam  Vitals reviewed.   Constitutional:       Appearance: He is well-developed.   HENT:      Head: Normocephalic and atraumatic.   Cardiovascular:      Rate and Rhythm: Normal rate and regular rhythm.      Pulses:           Dorsalis pedis pulses are 1+ on the right side and 1+ on the left side.      Heart sounds: Normal heart sounds. No murmur heard.  Pulmonary:      Effort: Pulmonary effort is normal.      Breath sounds: Normal breath sounds.   Musculoskeletal:      Right foot: No deformity.      Left foot: No deformity.   Feet:      Right foot:      Protective Sensation: 5 sites tested. 5 sites sensed.      Skin integrity: Skin integrity normal.      Left foot:      Protective Sensation: 5 sites tested. 5 sites sensed.      Skin integrity: Skin integrity normal.      Comments: Sensation in both feet slightly reduced to monofilament.     Diabetic Foot Exam Performed and Monofilament Test Performed    Neurological:      Mental Status: He is alert and oriented to person, place, and time.        He is overweight. There are diffuse seborrheic keratoses on the back and chest. There  "is slightly reduced sensation to monofilament in both feet. Exam is otherwise normal.    Procedures     Vitals:    05/12/23 0811   BP: 116/82   BP Location: Left arm   Patient Position: Sitting   Cuff Size: Adult   Pulse: 72   Temp: 96.9 °F (36.1 °C)   Weight: 123 kg (271 lb 12.8 oz)   Height: 200 cm (78.74\")   PainSc:   4   PainLoc: Generalized  Comment: arthritis       BMI is >= 30 and <35. (Class 1 Obesity). The following options were offered after discussion;: exercise counseling/recommendations and nutrition counseling/recommendations      Assessment & Plan    1. Prevention  - Overall turning 78 in a few days, he is doing quite well. Given his comorbidities, he is still reasonably active. He is up to date on vaccines.    2. Diabetes  - A1c is pending. He is up to date on eye examination. Foot exam was done today. Continue healthy diet and avoidance of weight gain.    3. Chronic kidney disease  - Glomerular filtration rate is pending. The treatment remains controlled blood sugar, blood pressure, and avoidance of nonsteroidal anti-inflammatory drugs.    4. Hypertension  - Blood pressure is well controlled with systolic blood pressure, now at a more acceptable range after reduction of lisinopril.    5. Hyperlipidemia  - Lipid panel is pending. Continue atorvastatin and healthy diet.    6. Diabetic neuropathy  - This remains fairly mild.    Follow up in 6 months.    Problem List Items Addressed This Visit        Cardiac and Vasculature    Mixed hyperlipidemia    Relevant Medications    atorvastatin (LIPITOR) 40 MG tablet    Other Relevant Orders    Lipid Panel    Primary hypertension    Relevant Medications    hydroCHLOROthiazide (HYDRODIURIL) 25 MG tablet    metoprolol tartrate (LOPRESSOR) 50 MG tablet    lisinopril (PRINIVIL,ZESTRIL) 5 MG tablet       Genitourinary and Reproductive     Diabetic nephropathy associated with type 2 diabetes mellitus (HCC)    Relevant Medications    hydroCHLOROthiazide (HYDRODIURIL) " 25 MG tablet    Chronic kidney disease, stage III (moderate)    Relevant Medications    hydroCHLOROthiazide (HYDRODIURIL) 25 MG tablet       Neuro    Diabetic polyneuropathy associated with type 2 diabetes mellitus (HCC)    Relevant Orders    Comprehensive Metabolic Panel    Hemoglobin A1c   Other Visit Diagnoses     Preventative health care    -  Primary        Patient Self-Management and Personalized Health Advice  The patient has been provided with information about: diet, exercise and weight management and preventive services including:   · Annual Wellness Visit (AWV).    Outpatient Encounter Medications as of 5/12/2023   Medication Sig Dispense Refill   • Acetaminophen 500 MG coapsule Take 2 capsules by mouth As Needed for Mild Pain.     • apixaban (ELIQUIS) 5 MG tablet tablet Take 1 tablet by mouth 2 (Two) Times a Day. Currently in a trial and it is eliquis or placebo.  Patient is not sure . Patient will hold for two days before surgery per orders from Dr Ramirez     • aspirin 81 MG EC tablet Take 1 tablet by mouth Daily. Pt in trial - may be a placebo     • atorvastatin (LIPITOR) 40 MG tablet Take 1 tablet by mouth Daily. 90 tablet 1   • cetirizine (zyrTEC) 10 MG tablet Take 1 tablet by mouth Every Night.     • clindamycin (CLEOCIN) 150 MG capsule Take 1 capsule by mouth 3 (Three) Times a Day.     • clopidogrel (PLAVIX) 75 MG tablet Take 1 tablet by mouth Daily. 90 tablet 0   • Cyanocobalamin (Vitamin B-12) 5000 MCG tablet dispersible Take 1 tablet by mouth Daily.     • guaiFENesin-dextromethorphan (ROBITUSSIN DM) 100-10 MG/5ML syrup Take 5 mL by mouth 3 (Three) Times a Day As Needed for Cough. 237 mL 0   • hydroCHLOROthiazide (HYDRODIURIL) 25 MG tablet Take 0.5 tablets by mouth Daily. 45 tablet 1   • lisinopril (PRINIVIL,ZESTRIL) 5 MG tablet Take 1 tablet by mouth Daily. 90 tablet 3   • Lumigan 0.01 % ophthalmic drops Administer 1 drop to both eyes Every Night.     • metoprolol tartrate (LOPRESSOR) 50 MG  tablet Take 1 tablet by mouth Every 12 (Twelve) Hours. 180 tablet 0   • Multiple Vitamins-Minerals (MULTIVITAMIN ADULT PO) Take 1 tablet by mouth Daily.       No facility-administered encounter medications on file as of 5/12/2023.       Reviewed use of high risk medication in the elderly: yes  Reviewed for potential of harmful drug interactions in the elderly: yes    Follow Up:  Return in about 6 months (around 11/12/2023) for follow up.     There are no Patient Instructions on file for this visit.    An After Visit Summary and PPPS with all of these plans were given to the patient.            Transcribed from ambient dictation for Xavier Briones MD by Carlos Brunson.  05/12/23   10:25 EDT    Patient or patient representative verbalized consent to the visit recording.  I have personally performed the services described in this document as transcribed by the above individual, and it is both accurate and complete.

## 2023-06-12 RX ORDER — ATORVASTATIN CALCIUM 40 MG/1
40 TABLET, FILM COATED ORAL DAILY
Qty: 90 TABLET | Refills: 1 | Status: SHIPPED | OUTPATIENT
Start: 2023-06-12

## 2023-06-12 RX ORDER — CLOPIDOGREL BISULFATE 75 MG/1
75 TABLET ORAL DAILY
Qty: 90 TABLET | Refills: 0 | Status: SHIPPED | OUTPATIENT
Start: 2023-06-12

## 2023-06-12 RX ORDER — HYDROCHLOROTHIAZIDE 25 MG/1
12.5 TABLET ORAL DAILY
Qty: 45 TABLET | Refills: 0 | Status: SHIPPED | OUTPATIENT
Start: 2023-06-12

## 2023-06-12 RX ORDER — METOPROLOL TARTRATE 50 MG/1
50 TABLET, FILM COATED ORAL EVERY 12 HOURS
Qty: 180 TABLET | Refills: 0 | Status: SHIPPED | OUTPATIENT
Start: 2023-06-12

## 2023-08-11 ENCOUNTER — CLINICAL SUPPORT NO REQUIREMENTS (OUTPATIENT)
Dept: CARDIOLOGY | Facility: CLINIC | Age: 78
End: 2023-08-11
Payer: MEDICARE

## 2023-09-11 RX ORDER — METOPROLOL TARTRATE 50 MG/1
50 TABLET, FILM COATED ORAL EVERY 12 HOURS
Qty: 180 TABLET | Refills: 0 | Status: SHIPPED | OUTPATIENT
Start: 2023-09-11

## 2023-09-11 RX ORDER — CLOPIDOGREL BISULFATE 75 MG/1
75 TABLET ORAL DAILY
Qty: 90 TABLET | Refills: 0 | Status: SHIPPED | OUTPATIENT
Start: 2023-09-11

## 2023-09-11 RX ORDER — HYDROCHLOROTHIAZIDE 25 MG/1
12.5 TABLET ORAL DAILY
Qty: 45 TABLET | Refills: 0 | Status: SHIPPED | OUTPATIENT
Start: 2023-09-11

## 2023-09-23 PROCEDURE — 93295 DEV INTERROG REMOTE 1/2/MLT: CPT | Performed by: INTERNAL MEDICINE

## 2023-09-23 PROCEDURE — 93296 REM INTERROG EVL PM/IDS: CPT | Performed by: INTERNAL MEDICINE

## 2023-11-01 ENCOUNTER — OFFICE VISIT (OUTPATIENT)
Dept: CARDIOLOGY | Facility: CLINIC | Age: 78
End: 2023-11-01
Payer: MEDICARE

## 2023-11-01 VITALS
SYSTOLIC BLOOD PRESSURE: 98 MMHG | HEIGHT: 78 IN | OXYGEN SATURATION: 94 % | BODY MASS INDEX: 30.94 KG/M2 | WEIGHT: 267.4 LBS | DIASTOLIC BLOOD PRESSURE: 68 MMHG | HEART RATE: 78 BPM

## 2023-11-01 DIAGNOSIS — I25.10 CORONARY ARTERY DISEASE INVOLVING NATIVE CORONARY ARTERY OF NATIVE HEART WITHOUT ANGINA PECTORIS: Primary | ICD-10-CM

## 2023-11-01 DIAGNOSIS — E78.2 MIXED HYPERLIPIDEMIA: ICD-10-CM

## 2023-11-01 DIAGNOSIS — I10 PRIMARY HYPERTENSION: ICD-10-CM

## 2023-11-01 NOTE — PROGRESS NOTES
[facility]  Office visit  Abbe Palacios  1945  [phone]    VISIT DATE:  11/1/2023      PCP: Xavier Briones MD  [address] [place] [id number]    CC:  Chief Complaint   Patient presents with    Coronary Artery Disease       PROBLEM LIST:   Coronary artery disease:   Status post stenting of right coronary artery, bare-metal stent for acute myocardial infarction, 11/07/2007.  Status post elective stenting of proximal left anterior descending coronary artery, 11/14/2007.  Elina-infarction VF and post-infarction  ventricular ectopy with positive EP study culminating in dual-chamber ICD implantation, November 2007.  Recurrent chest pain with non-STEMI, status post cardiac catheterization, 01/24/2013, Dr. Jarrett:  PTCA and stenting of the first diagonal using  a 2.5 x 24 mm Promus drug-eluting stent, PTCA and stenting of the ramus intermedius using a 2.25 x 20 mm Promus drug-eluting stent.  Benign hypertension.  Hyperlipidemia.  Peripheral neuropathy.  Status post ICD generator change,  January 2014.     ASSESSMENT:   Diagnosis Plan   1. Coronary artery disease involving native coronary artery of native heart without angina pectoris  Adult Transthoracic Echo Complete W/ Cont if Necessary Per Protocol      2. Mixed hyperlipidemia        3. Primary hypertension          Device interrogation:  Medtronic dual-chamber ICD  Normal device interrogation, see scanned sheet for details  Not dependent      PLAN:  Coronary artery disease: Stable and asymptomatic. continue Plavix.  Continue high intensity statin therapy and current afterload reduction.  Transthoracic echo pending to assess underlying myocardial structure and function.    Elina-infarct V. fib status post ICD implantation: Continue beta-blockade.    Hyperlipidemia: Currently well controlled.  Continue current medical therapy, Goal LDL <100, ideally less than 70.  Mild hypertriglyceridemia, continue dietary and  "lifestyle modifications.      Hypertension: Goal less than 130/80, intermittent symptomatic hypotension.  Discontinuing hydrochlorothiazide.    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    Paroxysmal atrial fibrillation: Chads vas score equal to 7.  Very limited burden of arrhythmia noted on device interrogations.  Previously in [place] trial.  We will continue low-dose aspirin for the time being.    Subjective  Diabetic, managing with diet and exercise.  Report stable functional capacity.  Denies chest pain, palpitations or dyspnea on exertion.  Intermittent relative symptomatic hypotension with fatigue and mild orthostasis..  Intermittent mild gait instability.  Still playing golf 2 days a week during the summer with a group of friends.  Denies myalgias on statin therapy.  He is compliant with medical therapy.  Device interrogation was negative for arrhythmia.      PHYSICAL EXAMINATION:  Vitals:    11/01/23 0912   BP: 98/68   BP Location: Left arm   Patient Position: Sitting   Cuff Size: Adult   Pulse: 78   SpO2: 94%   Weight: 121 kg (267 lb 6.4 oz)   Height: 198.1 cm (77.99\")       General Appearance:    Alert, cooperative, no distress, appears stated age   Head:    Normocephalic, without obvious abnormality, atraumatic   Eyes:    conjunctiva/corneas clear   Nose:   Nares normal, septum midline, mucosa normal, no drainage   Throat:   Lips, teeth and gums normal   Neck:   Supple, symmetrical, trachea midline, no carotid    bruit or JVD   Lungs:     Clear to auscultation bilaterally, respirations unlabored   Chest Wall:    No tenderness or deformity    Heart:    Regular rate and rhythm, S1 and S2 normal, no murmur, rub   or gallop, normal carotid impulse bilaterally without bruit.   Abdomen:     Soft, non-tender   Extremities:   Extremities normal, atraumatic, no cyanosis or edema   Pulses:   2+ and symmetric all extremities   Skin:   Skin color, texture, turgor normal, no " rashes or lesions       Diagnostic Data:  Procedures  Lab Results   Component Value Date    CHLPL 157 05/12/2023    TRIG 218 (H) 05/12/2023    HDL 40 05/12/2023     Lab Results   Component Value Date    GLUCOSE 141 (H) 05/12/2023    BUN 21 05/12/2023    CREATININE 1.39 (H) 05/12/2023     05/12/2023    K 4.7 05/12/2023     05/12/2023    CO2 29.3 (H) 05/12/2023     Lab Results   Component Value Date    HGBA1C 7.00 (H) 05/12/2023     Lab Results   Component Value Date    WBC 9.71 11/11/2022    HGB 15.9 11/11/2022    HCT 45.9 11/11/2022     11/11/2022       Allergies  Allergies   Allergen Reactions    Biaxin [Clarithromycin] Nausea Only    Clindamycin/Lincomycin Other (See Comments)     Caused incontinence - pt states he has taken this w/o any problems    Levaquin [Levofloxacin] Nausea Only    Nitroglycerin Other (See Comments)     hypotension    Penicillins Rash       Current Medications    Current Outpatient Medications:     Acetaminophen 500 MG coapsule, Take 2 capsules by mouth As Needed for Mild Pain., Disp: , Rfl:     aspirin 81 MG EC tablet, Take 1 tablet by mouth Daily., Disp: , Rfl:     atorvastatin (LIPITOR) 40 MG tablet, Take 1 tablet by mouth Daily., Disp: 90 tablet, Rfl: 1    cetirizine (zyrTEC) 10 MG tablet, Take 1 tablet by mouth Every Night., Disp: , Rfl:     clopidogrel (PLAVIX) 75 MG tablet, Take 1 tablet by mouth Daily., Disp: 90 tablet, Rfl: 0    Cyanocobalamin (Vitamin B-12) 5000 MCG tablet dispersible, Take 1 tablet by mouth Daily., Disp: , Rfl:     guaiFENesin-dextromethorphan (ROBITUSSIN DM) 100-10 MG/5ML syrup, Take 5 mL by mouth 3 (Three) Times a Day As Needed for Cough., Disp: 237 mL, Rfl: 0    lisinopril (PRINIVIL,ZESTRIL) 5 MG tablet, Take 1 tablet by mouth Daily., Disp: 90 tablet, Rfl: 3    Lumigan 0.01 % ophthalmic drops, Administer 1 drop to both eyes Every Night., Disp: , Rfl:     metoprolol tartrate (LOPRESSOR) 50 MG tablet, Take 1 tablet by mouth Every 12 (Twelve)  Hours., Disp: 180 tablet, Rfl: 0    Multiple Vitamins-Minerals (MULTIVITAMIN ADULT PO), Take 1 tablet by mouth Daily., Disp: , Rfl:     methocarbamol (ROBAXIN) 750 MG tablet, Take 2 tablets by mouth 3 (Three) Times a Day As Needed for Muscle Spasms., Disp: 30 tablet, Rfl: 0          ROS  Review of Systems   Cardiovascular:  Negative for chest pain, dyspnea on exertion, irregular heartbeat and palpitations.   Respiratory:  Negative for shortness of breath and sputum production.          SOCIAL HX  Social History     Socioeconomic History    Marital status:    Tobacco Use    Smoking status: Never    Smokeless tobacco: Never    Tobacco comments:     no passive smoke exposure   Vaping Use    Vaping Use: Never used   Substance and Sexual Activity    Alcohol use: Not Currently     Alcohol/week: 2.0 standard drinks of alcohol     Types: 1 Glasses of wine, 1 Cans of beer per week     Comment: very rarely    Drug use: No    Sexual activity: Not Currently     Partners: Female       FAMILY HX  Family History   Problem Relation Age of Onset    Hepatitis Mother     Dementia Father     Stroke Sister     Hypertension Brother     Heart attack Paternal Grandfather 70             Abbe Mendieta III, MD, FACC

## 2023-11-14 ENCOUNTER — LAB (OUTPATIENT)
Dept: LAB | Facility: HOSPITAL | Age: 78
End: 2023-11-14
Payer: MEDICARE

## 2023-11-14 ENCOUNTER — OFFICE VISIT (OUTPATIENT)
Dept: INTERNAL MEDICINE | Facility: CLINIC | Age: 78
End: 2023-11-14
Payer: MEDICARE

## 2023-11-14 VITALS
TEMPERATURE: 97.7 F | DIASTOLIC BLOOD PRESSURE: 70 MMHG | SYSTOLIC BLOOD PRESSURE: 118 MMHG | BODY MASS INDEX: 31.26 KG/M2 | HEIGHT: 78 IN | HEART RATE: 84 BPM | WEIGHT: 270.2 LBS

## 2023-11-14 DIAGNOSIS — I10 PRIMARY HYPERTENSION: ICD-10-CM

## 2023-11-14 DIAGNOSIS — N18.31 STAGE 3A CHRONIC KIDNEY DISEASE: ICD-10-CM

## 2023-11-14 DIAGNOSIS — E11.21 DIABETIC NEPHROPATHY ASSOCIATED WITH TYPE 2 DIABETES MELLITUS: Primary | ICD-10-CM

## 2023-11-14 DIAGNOSIS — E11.21 DIABETIC NEPHROPATHY ASSOCIATED WITH TYPE 2 DIABETES MELLITUS: ICD-10-CM

## 2023-11-14 DIAGNOSIS — E78.2 MIXED HYPERLIPIDEMIA: ICD-10-CM

## 2023-11-14 NOTE — PROGRESS NOTES
[facility]     Abbe Palacios  1945   [id number]      Patient Care Team:  Xavier Briones MD as PCP - General  Xavier Briones MD as PCP - Family Medicine  Abbe Mendieta III, MD as Cardiologist (Cardiology)    Chief Complaint::   Chief Complaint   Patient presents with    Diabetes    Hyperlipidemia    Hypertension        HPI  The patient comes in for follow-up of diabetes, hypertension, hyperlipidemia, and chronic kidney disease.    Left lower extremity pain  He has had a couple of minor episodes of soreness in his left lower extremity. He performs stretching exercises that helps his symptoms. He admits his pain has decreased.    Numbness in the right hand  He currently experiences numbness in his right hand at night. His brother is having carpal tunnel surgery.    Balance issues  He complains of balance issues. He refrains from using stairs. He can walk about 30 minutes at a time without any discomfort. He currently uses a cane and a walker.    Health maintenance  He has received all of his vaccines. He has received the COVID-19 and RSV and received the influenza vaccine a few weeks later. He denies any adverse reactions to the COVID-19 vaccine.    Chronic Conditions:  Diabetes, hypertension, hyperlipidemia, and chronic kidney disease.    Patient Active Problem List   Diagnosis    Coronary artery disease    Peripheral neuropathy    VF (ventricular fibrillation)    Hypertrophy of prostate without urinary obstruction    Diabetic polyneuropathy associated with type 2 diabetes mellitus    Obesity    Mixed hyperlipidemia    Primary hypertension    Seasonal allergic rhinitis    Diabetic nephropathy associated with type 2 diabetes mellitus    Chronic kidney disease, stage III (moderate)    Malignant melanoma of overlapping sites    Acute sepsis        Past Medical History:   Diagnosis Date    Benign hypertension     Cancer Basal cell    skin     CAP (community acquired pneumonia) 5/27/2022     Cataracts, both eyes     needs surgery     Chronic systolic (congestive) heart failure     Coronary artery disease 2007, 2013    MI x2; 6 stents     Diabetic nephropathy associated with type 2 diabetes mellitus 10/27/2019    Diabetic peripheral angiopathy     Diabetic polyneuropathy associated with type 2 diabetes mellitus     Glaucoma     History of COVID-19 01/2020    no hospitalizaiton     Hyperlipidemia     Implantable cardioverter-defibrillator (ICD) generator end of life 01/01/2014    Status post ICD generator change    Melanoma     Myocardial infarction 2013    NSTEMI    Osteoarthritis of left hip     Peripheral neuropathy     Stroke     PONTINE STROKE    Ventricular tachycardia     Wears eyeglasses        Past Surgical History:   Procedure Laterality Date    APPENDECTOMY  1979    CARDIAC CATHETERIZATION      CARDIAC DEFIBRILLATOR PLACEMENT  2007    CARDIAC ELECTROPHYSIOLOGY PROCEDURE N/A 10/06/2021    Procedure: ICD DC generator change/medtronic 2-4 weeks/Hold eliquis 3 days per Dr Dickens;  Surgeon: Alexis Dickens MD;  Location:  [facility];  Service: Cardiology;  Laterality: N/A;    CARDIAC PACEMAKER PLACEMENT  2007    dual chamber    COLONOSCOPY  2013    CORONARY STENT PLACEMENT  2007    elective stents to LAD, CIRC    CORONARY STENT PLACEMENT  2007    emergent L heart cath with stents RCA     CORONARY STENT PLACEMENT      2013?  stents to diagonal and ramus    HEAD/NECK LESION/CYST EXCISION Left 03/07/2022    Procedure: RE-EXCISION MELANOMA IN SITU LEFT SUPRACLAVICULAR FOSSA;  Surgeon: Sathish Phillips MD;  Location: [facility] OR;  Service: General;  Laterality: Left;    HIP SURGERY Left 01/16/2018    total left hip arthroplasty, anterior approach    JOINT REPLACEMENT Left     hip    MOUTH SURGERY  04/25/2022    SQUAMOUS CELL CARCINOMA EXCISION      TONSILLECTOMY      WISDOM TOOTH EXTRACTION         Family History   Problem Relation Age of Onset    Hepatitis Mother     Dementia Father      Stroke Sister     Hypertension Brother     Heart attack Paternal Grandfather 70       Social History     Socioeconomic History    Marital status:    Tobacco Use    Smoking status: Never    Smokeless tobacco: Never    Tobacco comments:     no passive smoke exposure   Vaping Use    Vaping Use: Never used   Substance and Sexual Activity    Alcohol use: Not Currently     Alcohol/week: 2.0 standard drinks of alcohol     Types: 1 Glasses of wine, 1 Cans of beer per week     Comment: very rarely    Drug use: No    Sexual activity: Not Currently     Partners: Female       Allergies   Allergen Reactions    Biaxin [Clarithromycin] Nausea Only    Clindamycin/Lincomycin Other (See Comments)     Caused incontinence - pt states he has taken this w/o any problems    Levaquin [Levofloxacin] Nausea Only    Nitroglycerin Other (See Comments)     hypotension    Penicillins Rash         Current Outpatient Medications:     Acetaminophen 500 MG coapsule, Take 2 capsules by mouth As Needed for Mild Pain., Disp: , Rfl:     aspirin 81 MG EC tablet, Take 1 tablet by mouth Daily., Disp: , Rfl:     atorvastatin (LIPITOR) 40 MG tablet, Take 1 tablet by mouth Daily., Disp: 90 tablet, Rfl: 1    cetirizine (zyrTEC) 10 MG tablet, Take 1 tablet by mouth Every Night., Disp: , Rfl:     clopidogrel (PLAVIX) 75 MG tablet, Take 1 tablet by mouth Daily., Disp: 90 tablet, Rfl: 0    Cyanocobalamin (Vitamin B-12) 5000 MCG tablet dispersible, Take 1 tablet by mouth Daily., Disp: , Rfl:     guaiFENesin-dextromethorphan (ROBITUSSIN DM) 100-10 MG/5ML syrup, Take 5 mL by mouth 3 (Three) Times a Day As Needed for Cough., Disp: 237 mL, Rfl: 0    lisinopril (PRINIVIL,ZESTRIL) 5 MG tablet, Take 1 tablet by mouth Daily., Disp: 90 tablet, Rfl: 3    Lumigan 0.01 % ophthalmic drops, Administer 1 drop to both eyes Every Night., Disp: , Rfl:     metoprolol tartrate (LOPRESSOR) 50 MG tablet, Take 1 tablet by mouth Every 12 (Twelve) Hours., Disp: 180 tablet, Rfl:  "0    Multiple Vitamins-Minerals (MULTIVITAMIN ADULT PO), Take 1 tablet by mouth Daily., Disp: , Rfl:     Review of Systems   Constitutional:  Negative for chills, fatigue and fever.   HENT:  Negative for congestion, ear pain and sinus pressure.    Respiratory:  Negative for cough, chest tightness, shortness of breath and wheezing.    Cardiovascular:  Negative for chest pain and palpitations.   Gastrointestinal:  Negative for abdominal pain, blood in stool and constipation.   Skin:  Negative for color change.   Allergic/Immunologic: Negative for environmental allergies.   Neurological:  Negative for dizziness, speech difficulty and headache.   Psychiatric/Behavioral:  Negative for decreased concentration. The patient is not nervous/anxious.         Vital Signs  Vitals:    11/14/23 0812   BP: 118/70   BP Location: Left arm   Patient Position: Sitting   Cuff Size: Adult   Pulse: 84   Temp: 97.7 °F (36.5 °C)   Weight: 123 kg (270 lb 3.2 oz)   Height: 198.1 cm (77.99\")   PainSc: 0-No pain       Physical Exam  Vitals reviewed.   Constitutional:       Appearance: He is well-developed.   HENT:      Head: Normocephalic and atraumatic.   Cardiovascular:      Rate and Rhythm: Normal rate and regular rhythm.      Heart sounds: Normal heart sounds. No murmur heard.  Pulmonary:      Effort: Pulmonary effort is normal.      Breath sounds: Normal breath sounds.   Neurological:      Mental Status: He is alert and oriented to person, place, and time.          Procedures    ACE III MINI             Assessment/Plan:    Diagnoses and all orders for this visit:    1. Diabetic nephropathy associated with type 2 diabetes mellitus (Primary)  -     Comprehensive Metabolic Panel; Future  -     Hemoglobin A1c; Future  -     Microalbumin / Creatinine Urine Ratio - Urine, Clean Catch; Future    2. Stage 3a chronic kidney disease    3. Primary hypertension  -     CBC & Differential; Future    4. Mixed hyperlipidemia  -     Lipid Panel; " Future        1. Diabetes  - Hemoglobin A1c is pending. He will continue working on a healthy diet. Hemoglobin A1c was up last visit, so we will need to consider medication if it is not back down.    2. Hypertension  - Blood pressure is well controlled on lisinopril and metoprolol.    3. Hyperlipidemia  - Lipid panel is pending. Continue atorvastatin and healthy diet.    4. Numbness in the right hand  - Possibly secondary to carpal tunnel, but with symptoms occurring only when he arises in the morning and resolving quickly with movement, suggested a splint on the right wrist to wear only at night.    5. Left lower extremity pain  - Much improved, managed by regular back exercises and stretching. I think this is likely due to lumbar spine disease. The treatment remains his regular back exercises. I also encouraged him to continue regular walking to maintain strength and balance.    Follow up in 6 months.    Plan of care reviewed with patient at the conclusion of today's visit. Education was provided regarding diagnosis, management, and any prescribed or recommended OTC medications.Patient verbalizes understanding of and agreement with management plan.         Xavier Briones MD         Transcribed from ambient dictation for Xavier Briones MD by Judy Pruett.  11/14/23   09:49 EST    Patient or patient representative verbalized consent to the visit recording.  I have personally performed the services described in this document as transcribed by the above individual, and it is both accurate and complete.

## 2023-11-15 LAB
ALBUMIN SERPL-MCNC: 4.7 G/DL (ref 3.5–5.2)
ALBUMIN/CREAT UR: 20 MG/G CREAT (ref 0–29)
ALBUMIN/GLOB SERPL: 2 G/DL
ALP SERPL-CCNC: 71 U/L (ref 39–117)
ALT SERPL-CCNC: 23 U/L (ref 1–41)
AST SERPL-CCNC: 28 U/L (ref 1–40)
BASOPHILS # BLD AUTO: 0.06 10*3/MM3 (ref 0–0.2)
BASOPHILS NFR BLD AUTO: 0.6 % (ref 0–1.5)
BILIRUB SERPL-MCNC: 0.7 MG/DL (ref 0–1.2)
BUN SERPL-MCNC: 13 MG/DL (ref 8–23)
BUN/CREAT SERPL: 11.6 (ref 7–25)
CALCIUM SERPL-MCNC: 9.7 MG/DL (ref 8.6–10.5)
CHLORIDE SERPL-SCNC: 103 MMOL/L (ref 98–107)
CHOLEST SERPL-MCNC: 167 MG/DL (ref 0–200)
CO2 SERPL-SCNC: 30.5 MMOL/L (ref 22–29)
CREAT SERPL-MCNC: 1.12 MG/DL (ref 0.76–1.27)
CREAT UR-MCNC: 237.3 MG/DL
EGFRCR SERPLBLD CKD-EPI 2021: 66.4 ML/MIN/1.73
EOSINOPHIL # BLD AUTO: 0.45 10*3/MM3 (ref 0–0.4)
EOSINOPHIL NFR BLD AUTO: 4.5 % (ref 0.3–6.2)
ERYTHROCYTE [DISTWIDTH] IN BLOOD BY AUTOMATED COUNT: 12.3 % (ref 12.3–15.4)
GLOBULIN SER CALC-MCNC: 2.4 GM/DL
GLUCOSE SERPL-MCNC: 124 MG/DL (ref 65–99)
HBA1C MFR BLD: 6.5 % (ref 4.8–5.6)
HCT VFR BLD AUTO: 45.1 % (ref 37.5–51)
HDLC SERPL-MCNC: 44 MG/DL (ref 40–60)
HGB BLD-MCNC: 15.6 G/DL (ref 13–17.7)
IMM GRANULOCYTES # BLD AUTO: 0.04 10*3/MM3 (ref 0–0.05)
IMM GRANULOCYTES NFR BLD AUTO: 0.4 % (ref 0–0.5)
LDLC SERPL CALC-MCNC: 89 MG/DL (ref 0–100)
LYMPHOCYTES # BLD AUTO: 2.58 10*3/MM3 (ref 0.7–3.1)
LYMPHOCYTES NFR BLD AUTO: 25.7 % (ref 19.6–45.3)
MCH RBC QN AUTO: 33.5 PG (ref 26.6–33)
MCHC RBC AUTO-ENTMCNC: 34.6 G/DL (ref 31.5–35.7)
MCV RBC AUTO: 96.8 FL (ref 79–97)
MICROALBUMIN UR-MCNC: 46.5 UG/ML
MONOCYTES # BLD AUTO: 0.76 10*3/MM3 (ref 0.1–0.9)
MONOCYTES NFR BLD AUTO: 7.6 % (ref 5–12)
NEUTROPHILS # BLD AUTO: 6.13 10*3/MM3 (ref 1.7–7)
NEUTROPHILS NFR BLD AUTO: 61.2 % (ref 42.7–76)
NRBC BLD AUTO-RTO: 0.1 /100 WBC (ref 0–0.2)
PLATELET # BLD AUTO: 271 10*3/MM3 (ref 140–450)
POTASSIUM SERPL-SCNC: 4.4 MMOL/L (ref 3.5–5.2)
PROT SERPL-MCNC: 7.1 G/DL (ref 6–8.5)
RBC # BLD AUTO: 4.66 10*6/MM3 (ref 4.14–5.8)
SODIUM SERPL-SCNC: 141 MMOL/L (ref 136–145)
TRIGL SERPL-MCNC: 198 MG/DL (ref 0–150)
VLDLC SERPL CALC-MCNC: 34 MG/DL (ref 5–40)
WBC # BLD AUTO: 10.02 10*3/MM3 (ref 3.4–10.8)

## 2023-12-11 RX ORDER — ATORVASTATIN CALCIUM 40 MG/1
40 TABLET, FILM COATED ORAL DAILY
Qty: 90 TABLET | Refills: 1 | Status: SHIPPED | OUTPATIENT
Start: 2023-12-11

## 2023-12-11 RX ORDER — HYDROCHLOROTHIAZIDE 25 MG/1
12.5 TABLET ORAL DAILY
Qty: 45 TABLET | Refills: 0 | OUTPATIENT
Start: 2023-12-11

## 2023-12-11 RX ORDER — ATORVASTATIN CALCIUM 40 MG/1
40 TABLET, FILM COATED ORAL DAILY
Qty: 90 TABLET | Refills: 1 | OUTPATIENT
Start: 2023-12-11

## 2023-12-11 RX ORDER — CLOPIDOGREL BISULFATE 75 MG/1
75 TABLET ORAL DAILY
Qty: 90 TABLET | Refills: 1 | Status: SHIPPED | OUTPATIENT
Start: 2023-12-11

## 2023-12-11 RX ORDER — METOPROLOL TARTRATE 50 MG/1
50 TABLET, FILM COATED ORAL EVERY 12 HOURS
Qty: 180 TABLET | Refills: 1 | Status: SHIPPED | OUTPATIENT
Start: 2023-12-11

## 2023-12-11 RX ORDER — METOPROLOL TARTRATE 50 MG/1
50 TABLET, FILM COATED ORAL EVERY 12 HOURS
Qty: 180 TABLET | Refills: 0 | OUTPATIENT
Start: 2023-12-11

## 2023-12-11 RX ORDER — HYDROCHLOROTHIAZIDE 25 MG/1
12.5 TABLET ORAL DAILY
Qty: 45 TABLET | Refills: 1 | OUTPATIENT
Start: 2023-12-11

## 2023-12-11 RX ORDER — CLOPIDOGREL BISULFATE 75 MG/1
75 TABLET ORAL DAILY
Qty: 90 TABLET | Refills: 0 | OUTPATIENT
Start: 2023-12-11

## 2023-12-11 NOTE — TELEPHONE ENCOUNTER
Lab Results   Component Value Date    GLUCOSE 124 (H) 11/14/2023    BUN 13 11/14/2023    CREATININE 1.12 11/14/2023    EGFRRESULT 66.4 11/14/2023    EGFR 66.8 05/29/2022    BCR 11.6 11/14/2023    K 4.4 11/14/2023    CO2 30.5 (H) 11/14/2023    CALCIUM 9.7 11/14/2023    PROTENTOTREF 7.1 11/14/2023    ALBUMIN 4.7 11/14/2023    BILITOT 0.7 11/14/2023    AST 28 11/14/2023    ALT 23 11/14/2023

## 2023-12-11 NOTE — TELEPHONE ENCOUNTER
Per Dr Mendieta on 11/1/2023 office visit - discontinue HCTZ for intermittent symptomatic hypotension

## 2023-12-21 ENCOUNTER — HOSPITAL ENCOUNTER (OUTPATIENT)
Dept: CARDIOLOGY | Facility: HOSPITAL | Age: 78
Discharge: HOME | End: 2023-12-21
Admitting: INTERNAL MEDICINE
Payer: MEDICARE

## 2023-12-21 VITALS — HEIGHT: 78 IN | WEIGHT: 277 LBS | BODY MASS INDEX: 32.05 KG/M2

## 2023-12-21 DIAGNOSIS — I25.10 CORONARY ARTERY DISEASE INVOLVING NATIVE CORONARY ARTERY OF NATIVE HEART WITHOUT ANGINA PECTORIS: ICD-10-CM

## 2023-12-21 LAB
ASCENDING AORTA: 4.5 CM
BH CV ECHO MEAS - AO MAX PG: 2.9 MMHG
BH CV ECHO MEAS - AO MEAN PG: 1.77 MMHG
BH CV ECHO MEAS - AO ROOT DIAM: 3.8 CM
BH CV ECHO MEAS - AO V2 MAX: 85.3 CM/SEC
BH CV ECHO MEAS - AO V2 VTI: 18.2 CM
BH CV ECHO MEAS - AVA(I,D): 4.8 CM2
BH CV ECHO MEAS - EDV(CUBED): 86.3 ML
BH CV ECHO MEAS - EDV(MOD-SP2): 62.4 ML
BH CV ECHO MEAS - EDV(MOD-SP4): 62.3 ML
BH CV ECHO MEAS - EF(MOD-BP): 54 %
BH CV ECHO MEAS - EF(MOD-SP2): 55 %
BH CV ECHO MEAS - EF(MOD-SP4): 52.3 %
BH CV ECHO MEAS - ESV(CUBED): 19.2 ML
BH CV ECHO MEAS - ESV(MOD-SP2): 28 ML
BH CV ECHO MEAS - ESV(MOD-SP4): 29.7 ML
BH CV ECHO MEAS - FS: 39.4 %
BH CV ECHO MEAS - IVS/LVPW: 1.44 CM
BH CV ECHO MEAS - IVSD: 1.66 CM
BH CV ECHO MEAS - LA DIMENSION: 2.9 CM
BH CV ECHO MEAS - LAT PEAK E' VEL: 7 CM/SEC
BH CV ECHO MEAS - LV DIASTOLIC VOL/BSA (35-75): 24.5 CM2
BH CV ECHO MEAS - LV MASS(C)D: 243 GRAMS
BH CV ECHO MEAS - LV MAX PG: 3.5 MMHG
BH CV ECHO MEAS - LV MEAN PG: 1.99 MMHG
BH CV ECHO MEAS - LV SYSTOLIC VOL/BSA (12-30): 11.7 CM2
BH CV ECHO MEAS - LV V1 MAX: 92.9 CM/SEC
BH CV ECHO MEAS - LV V1 VTI: 19.2 CM
BH CV ECHO MEAS - LVIDD: 4.4 CM
BH CV ECHO MEAS - LVIDS: 2.7 CM
BH CV ECHO MEAS - LVOT AREA: 4.6 CM2
BH CV ECHO MEAS - LVOT DIAM: 2.42 CM
BH CV ECHO MEAS - LVPWD: 1.15 CM
BH CV ECHO MEAS - MED PEAK E' VEL: 6 CM/SEC
BH CV ECHO MEAS - MV A MAX VEL: 66.5 CM/SEC
BH CV ECHO MEAS - MV DEC SLOPE: 281.6 CM/SEC2
BH CV ECHO MEAS - MV DEC TIME: 0.21 SEC
BH CV ECHO MEAS - MV E MAX VEL: 50.7 CM/SEC
BH CV ECHO MEAS - MV E/A: 0.76
BH CV ECHO MEAS - MV MAX PG: 1.8 MMHG
BH CV ECHO MEAS - MV MEAN PG: 0.78 MMHG
BH CV ECHO MEAS - MV P1/2T: 110 MSEC
BH CV ECHO MEAS - MV V2 VTI: 16 CM
BH CV ECHO MEAS - MVA(P1/2T): 2 CM2
BH CV ECHO MEAS - MVA(VTI): 5.5 CM2
BH CV ECHO MEAS - PA ACC TIME: 0.16 SEC
BH CV ECHO MEAS - PA V2 MAX: 97.1 CM/SEC
BH CV ECHO MEAS - PI END-D VEL: 70.6 CM/SEC
BH CV ECHO MEAS - RAP SYSTOLE: 8 MMHG
BH CV ECHO MEAS - RVSP: 24.4 MMHG
BH CV ECHO MEAS - SI(MOD-SP2): 13.5 ML/M2
BH CV ECHO MEAS - SI(MOD-SP4): 12.8 ML/M2
BH CV ECHO MEAS - SV(LVOT): 88.2 ML
BH CV ECHO MEAS - SV(MOD-SP2): 34.3 ML
BH CV ECHO MEAS - SV(MOD-SP4): 32.5 ML
BH CV ECHO MEAS - TAPSE (>1.6): 1.5 CM
BH CV ECHO MEAS - TR MAX PG: 16.4 MMHG
BH CV ECHO MEAS - TR MAX VEL: 202.6 CM/SEC
BH CV ECHO MEASUREMENTS AVERAGE E/E' RATIO: 7.8
BH CV XLRA - RV BASE: 4 CM
BH CV XLRA - RV LENGTH: 7 CM
BH CV XLRA - RV MID: 3.5 CM
BH CV XLRA - TDI S': 7 CM/SEC
IVRT: 110 MS
LEFT ATRIUM VOLUME INDEX: 28.1 ML/M2
LV EF 2D ECHO EST: 55 %

## 2023-12-21 PROCEDURE — 93306 TTE W/DOPPLER COMPLETE: CPT

## 2023-12-28 ENCOUNTER — TELEPHONE (OUTPATIENT)
Dept: CARDIOLOGY | Facility: CLINIC | Age: 78
End: 2023-12-28
Payer: MEDICARE

## 2023-12-28 NOTE — TELEPHONE ENCOUNTER
Called patient and he is aware of results of ECHO.  He did not have anymore questions.  HORTENSIA Cifuentes CMA    ----- Message from Gisselle Nieto sent at 12/28/2023 11:07 AM EST -----    ----- Message -----  From: Abbe Mendieta III, MD  Sent: 12/26/2023   8:20 AM EST  To: Jacinta Bach RN    Strength of heart muscle is normal, no concerning abnormalities noted on echo.

## 2024-03-13 RX ORDER — METHOCARBAMOL 750 MG/1
750 TABLET, FILM COATED ORAL 3 TIMES DAILY PRN
Qty: 90 TABLET | Refills: 1 | Status: SHIPPED | OUTPATIENT
Start: 2024-03-13

## 2024-03-25 ENCOUNTER — OFFICE VISIT (OUTPATIENT)
Dept: INTERNAL MEDICINE | Facility: CLINIC | Age: 79
End: 2024-03-25
Payer: MEDICARE

## 2024-03-25 VITALS
HEART RATE: 76 BPM | DIASTOLIC BLOOD PRESSURE: 62 MMHG | TEMPERATURE: 98 F | BODY MASS INDEX: 30.55 KG/M2 | SYSTOLIC BLOOD PRESSURE: 98 MMHG | WEIGHT: 264 LBS | HEIGHT: 78 IN

## 2024-03-25 DIAGNOSIS — M54.50 ACUTE MIDLINE LOW BACK PAIN WITHOUT SCIATICA: ICD-10-CM

## 2024-03-25 DIAGNOSIS — M25.551 RIGHT HIP PAIN: Primary | ICD-10-CM

## 2024-03-25 PROCEDURE — 99213 OFFICE O/P EST LOW 20 MIN: CPT | Performed by: NURSE PRACTITIONER

## 2024-03-25 PROCEDURE — 3078F DIAST BP <80 MM HG: CPT | Performed by: NURSE PRACTITIONER

## 2024-03-25 PROCEDURE — 1159F MED LIST DOCD IN RCRD: CPT | Performed by: NURSE PRACTITIONER

## 2024-03-25 PROCEDURE — 1160F RVW MEDS BY RX/DR IN RCRD: CPT | Performed by: NURSE PRACTITIONER

## 2024-03-25 PROCEDURE — 3074F SYST BP LT 130 MM HG: CPT | Performed by: NURSE PRACTITIONER

## 2024-03-25 NOTE — PROGRESS NOTES
"  Follow Up Office Visit      Patient Name: Abbe Palacios  : 1945   MRN: [id number]   Care Team: Patient Care Team:  Xavier Briones MD as PCP - General  Xavier Briones MD as PCP - Family Medicine  Abbe Mendieta III, MD as Cardiologist (Cardiology)    Chief Complaint:    Chief Complaint   Patient presents with    Leg Pain     Right leg pain        History of Present Illness: Abbe Palacios is a 78 y.o. male with pertinent medical history significant for hyperlipidemia, hypertension, coronary artery disease, history of VF, hypertrophy of the prostate, type 2 diabetes, chronic kidney disease stage III.     He presents today for right hip pain and low back pain.  Notes that the symptoms all began after a long travel back from [place] recently at the end of February.  He notes long periods of time sitting in traffic.    Upon return, he developed pain which he describes as tightness in the lateral and posterior right hip area along with low back tightness as well.  He notes that the anterior muscle of his right thigh is \"lumpy\".  He has tried therapies of OTC acetaminophen and heat application without significant relief.  Additionally, he has tried muscle relaxant (methocarbamol) prescribed by his PCP, Dr. Briones without any relief either.    He feels that the tightness is worse upon wakening in the morning or with long periods of sitting.  Somewhat improved with being up and ambulating.  He does endorse discomfort at night which will wake him, especially when lying on the right hip.    He has required use of cane for ambulatory assistance.  He denies any muscle weakness in the right lower extremity, no sensation change, no numbness or tingling        Subjective          I have reviewed and the following portions of the patient's history were updated as appropriate: past family history, past medical history, past social history, past surgical history and problem list.    Medications:     Current " "Outpatient Medications:     Acetaminophen 500 MG coapsule, Take 2 capsules by mouth As Needed for Mild Pain., Disp: , Rfl:     aspirin 81 MG EC tablet, Take 1 tablet by mouth Daily., Disp: , Rfl:     atorvastatin (LIPITOR) 40 MG tablet, Take 1 tablet by mouth Daily., Disp: 90 tablet, Rfl: 1    cetirizine (zyrTEC) 10 MG tablet, Take 1 tablet by mouth Every Night., Disp: , Rfl:     clopidogrel (PLAVIX) 75 MG tablet, Take 1 tablet by mouth Daily., Disp: 90 tablet, Rfl: 1    Cyanocobalamin (Vitamin B-12) 5000 MCG tablet dispersible, Take 1 tablet by mouth Daily., Disp: , Rfl:     guaiFENesin-dextromethorphan (ROBITUSSIN DM) 100-10 MG/5ML syrup, Take 5 mL by mouth 3 (Three) Times a Day As Needed for Cough., Disp: 237 mL, Rfl: 0    lisinopril (PRINIVIL,ZESTRIL) 5 MG tablet, Take 1 tablet by mouth Daily., Disp: 90 tablet, Rfl: 3    Lumigan 0.01 % ophthalmic drops, Administer 1 drop to both eyes Every Night., Disp: , Rfl:     methocarbamol (ROBAXIN) 750 MG tablet, Take 1 tablet by mouth 3 (Three) Times a Day As Needed for Muscle Spasms., Disp: 90 tablet, Rfl: 1    metoprolol tartrate (LOPRESSOR) 50 MG tablet, Take 1 tablet by mouth Every 12 (Twelve) Hours., Disp: 180 tablet, Rfl: 1    Multiple Vitamins-Minerals (MULTIVITAMIN ADULT PO), Take 1 tablet by mouth Daily., Disp: , Rfl:     Allergies:   Allergies   Allergen Reactions    Biaxin [Clarithromycin] Nausea Only    Clindamycin/Lincomycin Other (See Comments)     Caused incontinence - pt states he has taken this w/o any problems    Levaquin [Levofloxacin] Nausea Only    Nitroglycerin Other (See Comments)     hypotension    Penicillins Rash       Objective     Physical Exam:  Vital Signs:   Vitals:    03/25/24 1307   BP: 98/62   BP Location: Left arm   Patient Position: Sitting   Cuff Size: Adult   Pulse: 76   Temp: 98 °F (36.7 °C)   TempSrc: Temporal   Weight: 120 kg (264 lb)   Height: 198.1 cm (77.99\")   PainSc:   5   PainLoc: Leg     Body mass index is 30.51 kg/m². "     Physical Exam  Vitals and nursing note reviewed.   Constitutional:       Comments: Patient is accompanied by spouse   HENT:      Head: Normocephalic and atraumatic.   Eyes:      General: No scleral icterus.     Conjunctiva/sclera: Conjunctivae normal.   Cardiovascular:      Pulses: Normal pulses.      Comments: Bilateral posterior tibial pulses intact and equal  Musculoskeletal:         General: Tenderness present.      Right lower leg: No edema.      Left lower leg: No edema.      Comments: Lumbar spine with fair range of motion with forward bend, lateral bend and spinal rotation.  Patient notes some discomfort with tightness sensation in the mid low back with forward bend.    Right hip-flexion, extension, external and internal rotation intact with some endorsed discomfort of associated tightness.    There is point tenderness on the right greater trochanteric bursa with palpation.     Right lower extremity with negative Homans' sign, no palpable cords, negative for edema.   Neurological:      Mental Status: He is alert.      Comments: Ambulating with 3 prong cane   Psychiatric:         Mood and Affect: Mood normal.         Thought Content: Thought content normal.         Judgment: Judgment normal.         Assessment / Plan      Assessment/Plan:   Problems Addressed This Visit    ICD-10-CM ICD-9-CM   1. Right hip pain  M25.551 719.45   2. Acute midline low back pain without sciatica  M54.50 724.2      Right hip pain  Acute midline low back pain  -Associated with recent long car ride  -No red flags to suspect DVT  -Suspect there is a degree of bursitis  -Advised to continue stretching, trial of cool/cold pack application to the area of the bursa   -Referral to PT for eval and treat    Plan of care reviewed with patient at the conclusion of today's visit. Education was provided regarding diagnosis and management.  Patient verbalizes understanding of and agreement with management plan.        Follow Up:   Return if  symptoms worsen or fail to improve, for Next scheduled follow up.        KIRSTEN Harkins  [facility] [address]    Please note that portions of this note were completed with a voice recognition program.

## 2024-04-08 RX ORDER — LISINOPRIL 5 MG/1
5 TABLET ORAL DAILY
Qty: 90 TABLET | Refills: 0 | Status: SHIPPED | OUTPATIENT
Start: 2024-04-08

## 2024-05-14 ENCOUNTER — OFFICE VISIT (OUTPATIENT)
Dept: INTERNAL MEDICINE | Facility: CLINIC | Age: 79
End: 2024-05-14
Payer: MEDICARE

## 2024-05-14 ENCOUNTER — LAB (OUTPATIENT)
Dept: LAB | Facility: HOSPITAL | Age: 79
End: 2024-05-14
Payer: MEDICARE

## 2024-05-14 VITALS
SYSTOLIC BLOOD PRESSURE: 110 MMHG | TEMPERATURE: 98 F | WEIGHT: 264 LBS | HEIGHT: 78 IN | DIASTOLIC BLOOD PRESSURE: 86 MMHG | BODY MASS INDEX: 30.55 KG/M2 | HEART RATE: 75 BPM | OXYGEN SATURATION: 96 %

## 2024-05-14 DIAGNOSIS — N18.31 STAGE 3A CHRONIC KIDNEY DISEASE: ICD-10-CM

## 2024-05-14 DIAGNOSIS — I10 PRIMARY HYPERTENSION: ICD-10-CM

## 2024-05-14 DIAGNOSIS — E11.21 DIABETIC NEPHROPATHY ASSOCIATED WITH TYPE 2 DIABETES MELLITUS: ICD-10-CM

## 2024-05-14 DIAGNOSIS — E78.2 MIXED HYPERLIPIDEMIA: ICD-10-CM

## 2024-05-14 DIAGNOSIS — Z00.00 PREVENTATIVE HEALTH CARE: Primary | ICD-10-CM

## 2024-05-14 LAB
ALBUMIN SERPL-MCNC: 4.6 G/DL (ref 3.5–5.2)
ALBUMIN/GLOB SERPL: 1.9 G/DL
ALP SERPL-CCNC: 81 U/L (ref 39–117)
ALT SERPL-CCNC: 22 U/L (ref 1–41)
AST SERPL-CCNC: 31 U/L (ref 1–40)
BILIRUB SERPL-MCNC: 0.6 MG/DL (ref 0–1.2)
BUN SERPL-MCNC: 15 MG/DL (ref 8–23)
BUN/CREAT SERPL: 13 (ref 7–25)
CALCIUM SERPL-MCNC: 9.4 MG/DL (ref 8.6–10.5)
CHLORIDE SERPL-SCNC: 104 MMOL/L (ref 98–107)
CHOLEST SERPL-MCNC: 160 MG/DL (ref 0–200)
CO2 SERPL-SCNC: 25.3 MMOL/L (ref 22–29)
CREAT SERPL-MCNC: 1.15 MG/DL (ref 0.76–1.27)
EGFRCR SERPLBLD CKD-EPI 2021: 64.3 ML/MIN/1.73
GLOBULIN SER CALC-MCNC: 2.4 GM/DL
GLUCOSE SERPL-MCNC: 127 MG/DL (ref 65–99)
HBA1C MFR BLD: 6.6 % (ref 4.8–5.6)
HDLC SERPL-MCNC: 41 MG/DL (ref 40–60)
LDLC SERPL CALC-MCNC: 83 MG/DL (ref 0–100)
POTASSIUM SERPL-SCNC: 4.8 MMOL/L (ref 3.5–5.2)
PROT SERPL-MCNC: 7 G/DL (ref 6–8.5)
SODIUM SERPL-SCNC: 141 MMOL/L (ref 136–145)
TRIGL SERPL-MCNC: 217 MG/DL (ref 0–150)
VLDLC SERPL CALC-MCNC: 36 MG/DL (ref 5–40)

## 2024-05-14 NOTE — PROGRESS NOTES
QUICK REFERENCE INFORMATION:  The ABCs of the Annual Wellness Visit    Subsequent Medicare Wellness Visit    HEALTH RISK ASSESSMENT    1945    Recent Hospitalizations:  No hospitalization(s) within the last year..        Current Medical Providers:  Patient Care Team:  Xavier Briones MD as PCP - General  Xavier Briones MD as PCP - Family Medicine  Abbe Mendieta III, MD as Cardiologist (Cardiology)        Smoking Status:  Social History     Tobacco Use   Smoking Status Never   Smokeless Tobacco Never   Tobacco Comments    no passive smoke exposure       Alcohol Consumption:  Social History     Substance and Sexual Activity   Alcohol Use Not Currently    Alcohol/week: 2.0 standard drinks of alcohol    Types: 1 Glasses of wine, 1 Cans of beer per week    Comment: very rarely       Depression Screen:       2024     8:10 AM   PHQ-2/PHQ-9 Depression Screening   Little Interest or Pleasure in Doing Things 0-->not at all   Feeling Down, Depressed or Hopeless 0-->not at all   PHQ-9: Brief Depression Severity Measure Score 0       Health Habits and Functional and Cognitive Screenin/14/2024     8:10 AM   Functional & Cognitive Status   Do you have difficulty preparing food and eating? No   Do you have difficulty bathing yourself, getting dressed or grooming yourself? No   Do you have difficulty using the toilet? No   Do you have difficulty moving around from place to place? No   Do you have trouble with steps or getting out of a bed or a chair? No   Current Diet Well Balanced Diet   Dental Exam Up to date   Eye Exam Up to date   Exercise (times per week) 3 times per week   Current Exercises Include Walking   Do you need help using the phone?  No   Are you deaf or do you have serious difficulty hearing?  No   Do you need help to go to places out of walking distance? No   Do you need help shopping? No   Do you need help preparing meals?  No   Do you need help with housework?  No   Do you need  help with laundry? No   Do you need help taking your medications? No   Do you need help managing money? No   Do you ever drive or ride in a car without wearing a seat belt? No   Have you felt unusual stress, anger or loneliness in the last month? No   Who do you live with? Spouse   If you need help, do you have trouble finding someone available to you? No   Have you been bothered in the last four weeks by sexual problems? No   Do you have difficulty concentrating, remembering or making decisions? No       Fall Risk Screen:  STEADI Fall Risk Assessment was completed, and patient is at MODERATE risk for falls. Assessment completed on:5/14/2024    ACE III MINI        Does the patient have evidence of cognitive impairment? No    Aspirin use counseling: Taking ASA appropriately as indicated    Recent Lab Results:  CMP:  Lab Results   Component Value Date    BUN 15 05/14/2024    CREATININE 1.15 05/14/2024    EGFRIFNONA 57 (L) 01/03/2022    EGFRIFAFRI 66 04/21/2021    BCR 13.0 05/14/2024     05/14/2024    K 4.8 05/14/2024    CO2 25.3 05/14/2024    CALCIUM 9.4 05/14/2024    PROTENTOTREF 7.0 05/14/2024    ALBUMIN 4.6 05/14/2024    LABGLOBREF 2.4 05/14/2024    LABIL2 1.9 05/14/2024    BILITOT 0.6 05/14/2024    ALKPHOS 81 05/14/2024    AST 31 05/14/2024    ALT 22 05/14/2024     HbA1c:  Lab Results   Component Value Date    HGBA1C 6.60 (H) 05/14/2024    HGBA1C 6.50 (H) 11/14/2023     Microalbumin:  Lab Results   Component Value Date    MICROALBUR 46.5 11/14/2023     Lipid Panel  Lab Results   Component Value Date    TRIG 217 (H) 05/14/2024    HDL 41 05/14/2024    LDL 83 05/14/2024    AST 31 05/14/2024    ALT 22 05/14/2024       Visual Acuity:  No results found.    Age-appropriate Screening Schedule:  Refer to the list below for future screening recommendations based on patient's age, sex and/or medical conditions. Orders for these recommended tests are listed in the plan section. The patient has been provided with a  written plan.    Health Maintenance   Topic Date Due    TDAP/TD VACCINES (2 - Td or Tdap) 09/27/2022    COVID-19 Vaccine (6 - 2023-24 season) 01/14/2024    ANNUAL WELLNESS VISIT  05/12/2024    INFLUENZA VACCINE  08/01/2024    DIABETIC EYE EXAM  10/10/2024    HEMOGLOBIN A1C  11/14/2024    URINE MICROALBUMIN  11/14/2024    BMI FOLLOWUP  03/25/2025    LIPID PANEL  05/14/2025    RSV Vaccine - Adults  Completed    Pneumococcal Vaccine 65+  Completed    ZOSTER VACCINE  Completed    COLORECTAL CANCER SCREENING  Discontinued        Subjective   History of Present Illness    Abbe Palacios is a 79 y.o. male who presents for a Subsequent Wellness Visit and for follow-up of diabetes, chronic kidney disease, hypertension, and hyperlipidemia. He continues to see cardiology for coronary artery disease.    Preventative care  The patient maintains an active lifestyle, engaging in golf, with a satisfactory strength and stamina. He experiences numbness in his left arm, particularly when sleeping on it, which radiates up and downwards, albeit not severe discomfort. He has recently undergone physical therapy for bursitis and regularly uses a Thera-Band for stretching, which he finds beneficial. His respiratory and cardiac functions are satisfactory. His last ophthalmological examination was in 10/2023, with biannual check-ups. He consulted his dermatologist and dentist on 05/13/2024. He denies any gastrointestinal issues.    Hypertension  Dr. Mendieta has discontinued his hydrochlorothiazide medication. The patient occasionally experiences fluctuations in his blood pressure, ranging from 148/102 mmHg to 98/60 mmHg, depending on his readings. He is unable to attribute these fluctuations to any specific factors. He experiences lightheadedness when his blood pressure is low.    CHRONIC CONDITIONS    The following portions of the patient's history were reviewed and updated as appropriate: allergies, current medications, past family history, past  medical history, past social history, past surgical history, and problem list.    Outpatient Medications Prior to Visit   Medication Sig Dispense Refill    Acetaminophen 500 MG coapsule Take 2 capsules by mouth As Needed for Mild Pain.      aspirin 81 MG EC tablet Take 1 tablet by mouth Daily.      atorvastatin (LIPITOR) 40 MG tablet Take 1 tablet by mouth Daily. 90 tablet 1    cetirizine (zyrTEC) 10 MG tablet Take 1 tablet by mouth Every Night.      clopidogrel (PLAVIX) 75 MG tablet Take 1 tablet by mouth Daily. 90 tablet 1    Cyanocobalamin (Vitamin B-12) 5000 MCG tablet dispersible Take 1 tablet by mouth Daily.      guaiFENesin-dextromethorphan (ROBITUSSIN DM) 100-10 MG/5ML syrup Take 5 mL by mouth 3 (Three) Times a Day As Needed for Cough. 237 mL 0    lisinopril (PRINIVIL,ZESTRIL) 5 MG tablet TAKE 1 TABLET BY MOUTH DAILY 90 tablet 0    Lumigan 0.01 % ophthalmic drops Administer 1 drop to both eyes Every Night.      metoprolol tartrate (LOPRESSOR) 50 MG tablet Take 1 tablet by mouth Every 12 (Twelve) Hours. 180 tablet 1    Multiple Vitamins-Minerals (MULTIVITAMIN ADULT PO) Take 1 tablet by mouth Daily.      methocarbamol (ROBAXIN) 750 MG tablet Take 1 tablet by mouth 3 (Three) Times a Day As Needed for Muscle Spasms. 90 tablet 1     No facility-administered medications prior to visit.       Patient Active Problem List   Diagnosis    Coronary artery disease    Peripheral neuropathy    VF (ventricular fibrillation)    Hypertrophy of prostate without urinary obstruction    Diabetic polyneuropathy associated with type 2 diabetes mellitus    Obesity    Mixed hyperlipidemia    Primary hypertension    Seasonal allergic rhinitis    Diabetic nephropathy associated with type 2 diabetes mellitus    Chronic kidney disease, stage III (moderate)    Malignant melanoma of overlapping sites    Acute sepsis       Advance Care Planning:  ACP discussion was held with the patient during this visit. Patient has an advance directive  (not in EMR), copy requested.    Identification of Risk Factors:  Risk factors include: Advance Directive Discussion.    Review of Systems is otherwise unremarkable.    Compared to one year ago, the patient feels his physical health is the same.  Compared to one year ago, the patient feels his mental health is the same.    Objective     Physical Exam  Vitals and nursing note reviewed.   Constitutional:       Appearance: He is well-developed.   HENT:      Head: Normocephalic and atraumatic.      Right Ear: External ear normal.      Left Ear: External ear normal.      Nose: Nose normal.      Mouth/Throat:      Pharynx: No oropharyngeal exudate.   Eyes:      Conjunctiva/sclera: Conjunctivae normal.      Pupils: Pupils are equal, round, and reactive to light.   Neck:      Thyroid: No thyromegaly.      Vascular: No JVD.   Cardiovascular:      Rate and Rhythm: Normal rate and regular rhythm.      Pulses:           Dorsalis pedis pulses are 1+ on the right side and 1+ on the left side.      Heart sounds: Normal heart sounds. No murmur heard.     No friction rub. No gallop.   Pulmonary:      Effort: Pulmonary effort is normal. No respiratory distress.      Breath sounds: Normal breath sounds. No wheezing or rales.   Chest:      Chest wall: No tenderness.   Abdominal:      General: Bowel sounds are normal. There is no distension.      Palpations: Abdomen is soft. There is no mass.      Tenderness: There is no abdominal tenderness. There is no guarding or rebound.      Hernia: No hernia is present.   Musculoskeletal:         General: No tenderness. Normal range of motion.      Cervical back: Normal range of motion and neck supple.      Right foot: No deformity.      Left foot: No deformity.   Feet:      Right foot:      Protective Sensation: 5 sites tested.  5 sites sensed.      Skin integrity: Skin integrity normal.      Left foot:      Protective Sensation: 5 sites tested.  5 sites sensed.      Skin integrity: Skin  "integrity normal.      Comments: Sensation in left foot absent and right foot reduced to monofilament.     Diabetic Foot Exam Performed and Monofilament Test Performed    Lymphadenopathy:      Cervical: No cervical adenopathy.   Skin:     General: Skin is warm and dry.      Findings: No erythema or rash.      Comments: There is numerous seborrheic keratoses diffusely. No suspicious lesions.   Neurological:      Mental Status: He is alert and oriented to person, place, and time.      Cranial Nerves: No cranial nerve deficit.      Sensory: No sensory deficit.      Motor: No abnormal muscle tone.      Coordination: Coordination normal.      Deep Tendon Reflexes: Reflexes normal.   Psychiatric:         Behavior: Behavior normal.         Thought Content: Thought content normal.         Judgment: Judgment normal.          Procedures     Vitals:    05/14/24 0809   BP: 110/86   BP Location: Left arm   Patient Position: Sitting   Cuff Size: Adult   Pulse: 75   Temp: 98 °F (36.7 °C)   TempSrc: Infrared   SpO2: 96%   Weight: 120 kg (264 lb)   Height: 200.7 cm (79\")   PainSc: 0-No pain              Assessment & Plan   Problem List Items Addressed This Visit       Mixed hyperlipidemia    Relevant Medications    atorvastatin (LIPITOR) 40 MG tablet    Other Relevant Orders    Lipid Panel (Completed)    Primary hypertension    Relevant Medications    metoprolol tartrate (LOPRESSOR) 50 MG tablet    lisinopril (PRINIVIL,ZESTRIL) 5 MG tablet    Diabetic nephropathy associated with type 2 diabetes mellitus    Relevant Orders    Comprehensive Metabolic Panel (Completed)    Hemoglobin A1c (Completed)    Chronic kidney disease, stage III (moderate)     Other Visit Diagnoses       Preventative health care    -  Primary          Patient Self-Management and Personalized Health Advice  The patient has been provided with information about: diet, exercise, and weight management and preventive services including:   Annual Wellness Visit " (AWV).    Outpatient Encounter Medications as of 5/14/2024   Medication Sig Dispense Refill    Acetaminophen 500 MG coapsule Take 2 capsules by mouth As Needed for Mild Pain.      aspirin 81 MG EC tablet Take 1 tablet by mouth Daily.      atorvastatin (LIPITOR) 40 MG tablet Take 1 tablet by mouth Daily. 90 tablet 1    cetirizine (zyrTEC) 10 MG tablet Take 1 tablet by mouth Every Night.      clopidogrel (PLAVIX) 75 MG tablet Take 1 tablet by mouth Daily. 90 tablet 1    Cyanocobalamin (Vitamin B-12) 5000 MCG tablet dispersible Take 1 tablet by mouth Daily.      guaiFENesin-dextromethorphan (ROBITUSSIN DM) 100-10 MG/5ML syrup Take 5 mL by mouth 3 (Three) Times a Day As Needed for Cough. 237 mL 0    lisinopril (PRINIVIL,ZESTRIL) 5 MG tablet TAKE 1 TABLET BY MOUTH DAILY 90 tablet 0    Lumigan 0.01 % ophthalmic drops Administer 1 drop to both eyes Every Night.      metoprolol tartrate (LOPRESSOR) 50 MG tablet Take 1 tablet by mouth Every 12 (Twelve) Hours. 180 tablet 1    Multiple Vitamins-Minerals (MULTIVITAMIN ADULT PO) Take 1 tablet by mouth Daily.      [DISCONTINUED] methocarbamol (ROBAXIN) 750 MG tablet Take 1 tablet by mouth 3 (Three) Times a Day As Needed for Muscle Spasms. 90 tablet 1     No facility-administered encounter medications on file as of 5/14/2024.       Reviewed use of high risk medication in the elderly: yes  Reviewed for potential of harmful drug interactions in the elderly: yes    1. Preventive Care.  - Given the patient's comorbidities, his overall health status is commendable. A discussion was held regarding the fact that at his age, a colonoscopy is not deemed necessary for screening, to which the patient concurred. He was advised to resume a regular walking regimen.    2. Diabetes with Nephropathy and Neuropathy.  - The patient is current with his ophthalmological examinations. A foot examination was conducted today. He will maintain his efforts to maintain a healthy diet and avoid weight  gain.    3. Chronic Kidney Disease.  - The treatment plan includes health control of blood pressure and blood glucose, and the avoidance of NSAIDs.    4. Hypertension.  - Hydrochlorothiazide was discontinued due to symptomatic hypotension. The patient expressed concern over occasional systolic blood pressures in the 140s mmHg. We discussed the correct method of checking blood pressure in a calm, rested state. If he observes readings above 140 mmHg, he was advised to sit calmly for a minute and then repeat it.    5. Hyperlipidemia.  - A lipid panel is pending. He will continue with atorvastatin and a healthy diet.    6. Coronary Artery Disease.  He will continue his follow-up with cardiology.    Follow-up  The patient is scheduled for a follow-up visit in 6 months.      Follow Up:  Return in about 6 months (around 11/14/2024) for Medicare Wellness.     There are no Patient Instructions on file for this visit.    An After Visit Summary and PPPS with all of these plans were given to the patient.                 Transcribed from ambient dictation for Xavier Briones MD by Kassie Barlow.  05/14/24   09:05 EDT    Patient or patient representative verbalized consent to the visit recording.  I have personally performed the services described in this document as transcribed by the above individual, and it is both accurate and complete.

## 2024-06-07 RX ORDER — ATORVASTATIN CALCIUM 40 MG/1
40 TABLET, FILM COATED ORAL DAILY
Qty: 90 TABLET | Refills: 1 | Status: SHIPPED | OUTPATIENT
Start: 2024-06-07

## 2024-06-07 RX ORDER — CLOPIDOGREL BISULFATE 75 MG/1
75 TABLET ORAL DAILY
Qty: 90 TABLET | Refills: 1 | Status: SHIPPED | OUTPATIENT
Start: 2024-06-07

## 2024-06-07 RX ORDER — METOPROLOL TARTRATE 50 MG/1
50 TABLET, FILM COATED ORAL EVERY 12 HOURS
Qty: 180 TABLET | Refills: 1 | Status: SHIPPED | OUTPATIENT
Start: 2024-06-07

## 2024-07-01 RX ORDER — LISINOPRIL 5 MG/1
5 TABLET ORAL DAILY
Qty: 90 TABLET | Refills: 0 | Status: SHIPPED | OUTPATIENT
Start: 2024-07-01

## 2024-07-03 ENCOUNTER — OFFICE VISIT (OUTPATIENT)
Dept: CARDIOLOGY | Facility: CLINIC | Age: 79
End: 2024-07-03
Payer: MEDICARE

## 2024-07-03 VITALS
WEIGHT: 263 LBS | DIASTOLIC BLOOD PRESSURE: 68 MMHG | BODY MASS INDEX: 30.43 KG/M2 | SYSTOLIC BLOOD PRESSURE: 124 MMHG | OXYGEN SATURATION: 97 % | HEIGHT: 78 IN | HEART RATE: 86 BPM

## 2024-07-03 DIAGNOSIS — I25.10 CORONARY ARTERY DISEASE INVOLVING NATIVE CORONARY ARTERY OF NATIVE HEART WITHOUT ANGINA PECTORIS: Primary | ICD-10-CM

## 2024-07-03 DIAGNOSIS — E78.2 MIXED HYPERLIPIDEMIA: ICD-10-CM

## 2024-07-03 DIAGNOSIS — I49.01 VF (VENTRICULAR FIBRILLATION): ICD-10-CM

## 2024-07-03 DIAGNOSIS — I10 PRIMARY HYPERTENSION: ICD-10-CM

## 2024-07-03 NOTE — PROGRESS NOTES
[facility] at [facility]  Office visit  Abbe Palacios  1945  [phone]    VISIT DATE:  7/3/2024      PCP: Xavier Briones MD  [id number] [id number]    CC:  Chief Complaint   Patient presents with    Coronary Artery Disease     Coronary artery disease involving native coronary artery of native heart without angina pectoris           PROBLEM LIST:   Coronary artery disease:   Status post stenting of right coronary artery, bare-metal stent for acute myocardial infarction, 11/07/2007.  Status post elective stenting of proximal left anterior descending coronary artery, 11/14/2007.  Elina-infarction VF and post-infarction  ventricular ectopy with positive EP study culminating in dual-chamber ICD implantation, November 2007.  Recurrent chest pain with non-STEMI, status post cardiac catheterization, 01/24/2013, Dr. Jarrett:  PTCA and stenting of the first diagonal using  a 2.5 x 24 mm Promus drug-eluting stent, PTCA and stenting of the ramus intermedius using a 2.25 x 20 mm Promus drug-eluting stent.  Benign hypertension.  Hyperlipidemia.  Peripheral neuropathy.  Status post ICD generator change,  January 2014.     ASSESSMENT:   Diagnosis Plan   1. Coronary artery disease involving native coronary artery of native heart without angina pectoris        2. Mixed hyperlipidemia        3. Primary hypertension        4. VF (ventricular fibrillation)          Device interrogation:  Medtronic dual-chamber ICD  Normal device interrogation, see scanned sheet for details  Not dependent      PLAN:  Coronary artery disease: Stable and asymptomatic. continue Plavix.  Continue high intensity statin therapy and current afterload reduction.  Transthoracic echo pending to assess underlying myocardial structure and function.    Elina-infarct V. fib status post ICD implantation: Continue beta-blockade.    Hyperlipidemia: Currently well controlled.  Continue current medical therapy, Goal LDL  "<100, ideally less than 70.  Mild hypertriglyceridemia, continue dietary and lifestyle modifications.      Hypertension: Goal less than 130/80, intermittent symptomatic hypotension.  Discontinuing hydrochlorothiazide.    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    Paroxysmal atrial fibrillation: Chads vas score equal to 7.  Very limited burden of arrhythmia noted on device interrogations.  Previously in Ramer trial.  We will continue low-dose aspirin for the time being.    Subjective  Diabetic, managing with diet and exercise.  Report stable functional capacity.  Denies chest pain, palpitations or dyspnea on exertion.  No significant orthostasis.  Still playing golf 2 days a week during the summer with a group of friends.  Denies myalgias on statin therapy.  He is compliant with medical therapy.  Device interrogation was negative for arrhythmia.      PHYSICAL EXAMINATION:  Vitals:    07/03/24 1004   BP: 124/68   BP Location: Left arm   Patient Position: Sitting   Cuff Size: Adult   Pulse: 86   SpO2: 97%   Weight: 119 kg (263 lb)   Height: 198.1 cm (78\")         General Appearance:    Alert, cooperative, no distress, appears stated age   Head:    Normocephalic, without obvious abnormality, atraumatic   Eyes:    conjunctiva/corneas clear   Nose:   Nares normal, septum midline, mucosa normal, no drainage   Throat:   Lips, teeth and gums normal   Neck:   Supple, symmetrical, trachea midline, no carotid    bruit or JVD   Lungs:     Clear to auscultation bilaterally, respirations unlabored   Chest Wall:    No tenderness or deformity    Heart:    Regular rate and rhythm, S1 and S2 normal, no murmur, rub   or gallop, normal carotid impulse bilaterally without bruit.   Abdomen:     Soft, non-tender   Extremities:   Extremities normal, atraumatic, no cyanosis or edema   Pulses:   2+ and symmetric all extremities   Skin:   Skin color, texture, turgor normal, no rashes or lesions "       Diagnostic Data:  Procedures  Lab Results   Component Value Date    CHLPL 160 05/14/2024    TRIG 217 (H) 05/14/2024    HDL 41 05/14/2024     Lab Results   Component Value Date    GLUCOSE 127 (H) 05/14/2024    BUN 15 05/14/2024    CREATININE 1.15 05/14/2024     05/14/2024    K 4.8 05/14/2024     05/14/2024    CO2 25.3 05/14/2024     Lab Results   Component Value Date    HGBA1C 6.60 (H) 05/14/2024     Lab Results   Component Value Date    WBC 10.02 11/14/2023    HGB 15.6 11/14/2023    HCT 45.1 11/14/2023     11/14/2023       Allergies  Allergies   Allergen Reactions    Biaxin [Clarithromycin] Nausea Only    Clindamycin/Lincomycin Other (See Comments)     Caused incontinence - pt states he has taken this w/o any problems    Levaquin [Levofloxacin] Nausea Only    Nitroglycerin Other (See Comments)     hypotension    Penicillins Rash       Current Medications    Current Outpatient Medications:     Acetaminophen 500 MG coapsule, Take 2 capsules by mouth As Needed for Mild Pain., Disp: , Rfl:     aspirin 81 MG EC tablet, Take 1 tablet by mouth Daily., Disp: , Rfl:     atorvastatin (LIPITOR) 40 MG tablet, TAKE 1 TABLET BY MOUTH DAILY, Disp: 90 tablet, Rfl: 1    cetirizine (zyrTEC) 10 MG tablet, Take 1 tablet by mouth Every Night., Disp: , Rfl:     clopidogrel (PLAVIX) 75 MG tablet, TAKE 1 TABLET BY MOUTH DAILY, Disp: 90 tablet, Rfl: 1    Cyanocobalamin (Vitamin B-12) 5000 MCG tablet dispersible, Take 1 tablet by mouth Daily., Disp: , Rfl:     guaiFENesin-dextromethorphan (ROBITUSSIN DM) 100-10 MG/5ML syrup, Take 5 mL by mouth 3 (Three) Times a Day As Needed for Cough., Disp: 237 mL, Rfl: 0    lisinopril (PRINIVIL,ZESTRIL) 5 MG tablet, TAKE 1 TABLET BY MOUTH DAILY, Disp: 90 tablet, Rfl: 0    Lumigan 0.01 % ophthalmic drops, Administer 1 drop to both eyes Every Night., Disp: , Rfl:     metoprolol tartrate (LOPRESSOR) 50 MG tablet, TAKE 1 TABLET BY MOUTH EVERY 12 HOURS, Disp: 180 tablet, Rfl: 1     Multiple Vitamins-Minerals (MULTIVITAMIN ADULT PO), Take 1 tablet by mouth Daily., Disp: , Rfl:           ROS  Review of Systems   Cardiovascular:  Negative for chest pain, dyspnea on exertion, irregular heartbeat and palpitations.   Respiratory:  Negative for shortness of breath and sputum production.          SOCIAL HX  Social History     Socioeconomic History    Marital status:    Tobacco Use    Smoking status: Never    Smokeless tobacco: Never    Tobacco comments:     no passive smoke exposure   Vaping Use    Vaping status: Never Used   Substance and Sexual Activity    Alcohol use: Not Currently     Alcohol/week: 2.0 standard drinks of alcohol     Types: 1 Glasses of wine, 1 Cans of beer per week     Comment: very rarely    Drug use: No    Sexual activity: Not Currently     Partners: Female       FAMILY HX  Family History   Problem Relation Age of Onset    Hepatitis Mother     Arthritis Mother     Dementia Father     Stroke Sister     Hypertension Brother     Heart attack Paternal Grandfather 70             Abbe Mendieta III, MD, FACC

## 2024-09-25 ENCOUNTER — TELEPHONE (OUTPATIENT)
Dept: CARDIOLOGY | Facility: CLINIC | Age: 79
End: 2024-09-25
Payer: MEDICARE

## 2024-09-25 PROCEDURE — 93295 DEV INTERROG REMOTE 1/2/MLT: CPT | Performed by: INTERNAL MEDICINE

## 2024-09-25 PROCEDURE — 93296 REM INTERROG EVL PM/IDS: CPT | Performed by: INTERNAL MEDICINE

## 2024-09-26 RX ORDER — LISINOPRIL 5 MG/1
5 TABLET ORAL DAILY
Qty: 90 TABLET | Refills: 0 | Status: SHIPPED | OUTPATIENT
Start: 2024-09-26

## 2024-09-30 ENCOUNTER — CLINICAL SUPPORT NO REQUIREMENTS (OUTPATIENT)
Dept: CARDIOLOGY | Facility: CLINIC | Age: 79
End: 2024-09-30
Payer: MEDICARE

## 2024-09-30 DIAGNOSIS — Z95.810 ICD (IMPLANTABLE CARDIOVERTER-DEFIBRILLATOR), DUAL, IN SITU: Primary | ICD-10-CM

## 2024-09-30 NOTE — PROGRESS NOTES
Device checked and unable to reproduce RV lead noise.  RV trends normal.  Spoke to Dr Barajas due to Dr Mendieta being out today.  He suggest f/u with Dr Dickens in 2-3 months and follow remotely.  Mr Palacios is aware.

## 2024-11-12 ENCOUNTER — OFFICE VISIT (OUTPATIENT)
Dept: CARDIOLOGY | Facility: CLINIC | Age: 79
End: 2024-11-12
Payer: MEDICARE

## 2024-11-12 ENCOUNTER — PATIENT ROUNDING (BHMG ONLY) (OUTPATIENT)
Dept: CARDIOLOGY | Facility: CLINIC | Age: 79
End: 2024-11-12
Payer: MEDICARE

## 2024-11-12 VITALS
SYSTOLIC BLOOD PRESSURE: 112 MMHG | HEART RATE: 77 BPM | HEIGHT: 78 IN | WEIGHT: 267 LBS | OXYGEN SATURATION: 96 % | BODY MASS INDEX: 30.89 KG/M2 | DIASTOLIC BLOOD PRESSURE: 62 MMHG

## 2024-11-12 DIAGNOSIS — I49.01 VF (VENTRICULAR FIBRILLATION): Primary | ICD-10-CM

## 2024-11-12 DIAGNOSIS — I10 PRIMARY HYPERTENSION: Chronic | ICD-10-CM

## 2024-11-12 DIAGNOSIS — Z95.810 ICD (IMPLANTABLE CARDIOVERTER-DEFIBRILLATOR), DUAL, IN SITU: Chronic | ICD-10-CM

## 2024-11-12 PROCEDURE — 3078F DIAST BP <80 MM HG: CPT

## 2024-11-12 PROCEDURE — 1160F RVW MEDS BY RX/DR IN RCRD: CPT

## 2024-11-12 PROCEDURE — 3074F SYST BP LT 130 MM HG: CPT

## 2024-11-12 PROCEDURE — 1159F MED LIST DOCD IN RCRD: CPT

## 2024-11-12 PROCEDURE — 93283 PRGRMG EVAL IMPLANTABLE DFB: CPT

## 2024-11-12 PROCEDURE — 99214 OFFICE O/P EST MOD 30 MIN: CPT

## 2024-11-12 PROCEDURE — 93000 ELECTROCARDIOGRAM COMPLETE: CPT

## 2024-11-12 NOTE — PROGRESS NOTES
Cardiac Electrophysiology Outpatient Note  [facility]    Office Visit     Abbe Palacios  [id number]  11/12/2024    Primary Care Physician: Xavier Briones MD    Referred By: No ref. provider found    Subjective     Chief Complaint   Patient presents with    Coronary artery disease involving native coronary     PROBLEM LIST:   Coronary artery disease:   Status post stenting of right coronary artery, bare-metal stent for acute myocardial infarction, 11/07/2007.  Status post elective stenting of proximal left anterior descending coronary artery, 11/14/2007.  Elina-infarction VF and post-infarction  ventricular ectopy with positive EP study culminating in dual-chamber ICD implantation, November 2007.  Recurrent chest pain with non-STEMI, status post cardiac catheterization, 01/24/2013, Dr. Jarrett:  PTCA and stenting of the first diagonal using  a 2.5 x 24 mm Promus drug-eluting stent, PTCA and stenting of the ramus intermedius using a 2.25 x 20 mm Promus drug-eluting stent.  Benign hypertension.  Hyperlipidemia.  Peripheral neuropathy.  Status post ICD generator change,  January 2014.    History of Present Illness:   Abbe Palacios is a 79 y.o. male retired  and  in [place] who presents to my electrophysiology clinic for follow up of VF s/t prior MI s/p DC ICD.  He follows with Dr. Mendieta for CAD and cardiac risk factor management.  He had some brief RV lead noise in September.  He denies any proximity to high electrical output systems or welding.  This seems to have resolved and not been much of an issue.  He has good functional status.  He walks 15 to 20 minutes a day.  He denies any chest pain, shortness breath, palpitations, lower extremity edema, lightheadedness or syncopal episodes.  He did have a fall last month but it was a mechanical fall when he tripped over a rug.  There is no loss of consciousness.    Past Medical History:   Diagnosis Date    Benign  hypertension     Cancer Basal cell    skin     CAP (community acquired pneumonia) 5/27/2022    Cataracts, both eyes     needs surgery     Chronic systolic (congestive) heart failure     Coronary artery disease 2007, 2013    MI x2; 6 stents     Diabetic nephropathy associated with type 2 diabetes mellitus 10/27/2019    Diabetic peripheral angiopathy     Diabetic polyneuropathy associated with type 2 diabetes mellitus     Glaucoma     History of COVID-19 01/2020    no hospitalizaiton     Hyperlipidemia     Implantable cardioverter-defibrillator (ICD) generator end of life 01/01/2014    Status post ICD generator change    Melanoma     Myocardial infarction 2013    NSTEMI    Osteoarthritis of left hip     Peripheral neuropathy     Stroke     PONTINE STROKE    Ventricular tachycardia     Wears eyeglasses        Past Surgical History:   Procedure Laterality Date    APPENDECTOMY  1979    CARDIAC CATHETERIZATION      CARDIAC DEFIBRILLATOR PLACEMENT  2007    CARDIAC ELECTROPHYSIOLOGY PROCEDURE N/A 10/06/2021    Procedure: ICD DC generator change/medtronic 2-4 weeks/Hold eliquis 3 days per Dr Dickens;  Surgeon: Alexis Dickens MD;  Location:  [facility];  Service: Cardiology;  Laterality: N/A;    CARDIAC PACEMAKER PLACEMENT  2007    dual chamber    COLONOSCOPY  2013    CORONARY STENT PLACEMENT  2007    elective stents to LAD, CIRC    CORONARY STENT PLACEMENT  2007    emergent L heart cath with stents RCA     CORONARY STENT PLACEMENT      2013?  stents to diagonal and ramus    HEAD/NECK LESION/CYST EXCISION Left 03/07/2022    Procedure: RE-EXCISION MELANOMA IN SITU LEFT SUPRACLAVICULAR FOSSA;  Surgeon: Sathish Phillips MD;  Location: [facility] OR;  Service: General;  Laterality: Left;    HIP SURGERY Left 01/16/2018    total left hip arthroplasty, anterior approach    JOINT REPLACEMENT Left     hip    MOUTH SURGERY  04/25/2022    SQUAMOUS CELL CARCINOMA EXCISION      TONSILLECTOMY      WISDOM TOOTH EXTRACTION          Family History   Problem Relation Age of Onset    Hepatitis Mother     Arthritis Mother     Dementia Father     Stroke Sister     Hypertension Brother     Heart attack Paternal Grandfather 70       Social History     Socioeconomic History    Marital status:    Tobacco Use    Smoking status: Never     Passive exposure: Never    Smokeless tobacco: Never    Tobacco comments:     no passive smoke exposure   Vaping Use    Vaping status: Never Used   Substance and Sexual Activity    Alcohol use: Not Currently     Alcohol/week: 2.0 standard drinks of alcohol     Types: 1 Glasses of wine, 1 Cans of beer per week     Comment: very rarely    Drug use: No    Sexual activity: Not Currently     Partners: Female         Current Outpatient Medications:     Acetaminophen 500 MG coapsule, Take 2 capsules by mouth As Needed for Mild Pain., Disp: , Rfl:     aspirin 81 MG EC tablet, Take 1 tablet by mouth Daily., Disp: , Rfl:     atorvastatin (LIPITOR) 40 MG tablet, TAKE 1 TABLET BY MOUTH DAILY, Disp: 90 tablet, Rfl: 1    cetirizine (zyrTEC) 10 MG tablet, Take 1 tablet by mouth Every Night., Disp: , Rfl:     clopidogrel (PLAVIX) 75 MG tablet, TAKE 1 TABLET BY MOUTH DAILY, Disp: 90 tablet, Rfl: 1    Cyanocobalamin (Vitamin B-12) 5000 MCG tablet dispersible, Take 1 tablet by mouth Daily., Disp: , Rfl:     guaiFENesin-dextromethorphan (ROBITUSSIN DM) 100-10 MG/5ML syrup, Take 5 mL by mouth 3 (Three) Times a Day As Needed for Cough., Disp: 237 mL, Rfl: 0    lisinopril (PRINIVIL,ZESTRIL) 5 MG tablet, TAKE 1 TABLET BY MOUTH DAILY, Disp: 90 tablet, Rfl: 0    Lumigan 0.01 % ophthalmic drops, Administer 1 drop to both eyes Every Night., Disp: , Rfl:     metoprolol tartrate (LOPRESSOR) 50 MG tablet, TAKE 1 TABLET BY MOUTH EVERY 12 HOURS, Disp: 180 tablet, Rfl: 1    Multiple Vitamins-Minerals (MULTIVITAMIN ADULT PO), Take 1 tablet by mouth Daily., Disp: , Rfl:     Allergies:   Allergies   Allergen Reactions    Biaxin [Clarithromycin]  "Nausea Only    Clindamycin/Lincomycin Other (See Comments)     Caused incontinence - pt states he has taken this w/o any problems    Levaquin [Levofloxacin] Nausea Only    Nitroglycerin Other (See Comments)     hypotension    Penicillins Rash       Objective   Vital Signs: Blood pressure 112/62, pulse 77, height 198.1 cm (78\"), weight 121 kg (267 lb), SpO2 96%.    PHYSICAL EXAM  General appearance: Awake, alert, cooperative  Lungs: Clear to ascultation bilaterally  Heart: Regular rate and rhythm, no murmurs, no lower extremity swelling  Skin: Skin color, turgor normal, no rashes or lesions  Neurologic: Grossly normal     Lab Results   Component Value Date    GLUCOSE 127 (H) 05/14/2024    CALCIUM 9.4 05/14/2024     05/14/2024    K 4.8 05/14/2024    CO2 25.3 05/14/2024     05/14/2024    BUN 15 05/14/2024    CREATININE 1.15 05/14/2024    EGFRIFAFRI 66 04/21/2021    EGFRIFNONA 57 (L) 01/03/2022    BCR 13.0 05/14/2024    ANIONGAP 11.0 05/29/2022     Lab Results   Component Value Date    WBC 10.02 11/14/2023    HGB 15.6 11/14/2023    HCT 45.1 11/14/2023    MCV 96.8 11/14/2023     11/14/2023     Lab Results   Component Value Date    INR 1.09 03/04/2022    INR 1.00 01/06/2014    PROTIME 13.7 03/04/2022    PROTIME 10.7 01/06/2014     Lab Results   Component Value Date    TSH 4.730 (H) 10/29/2021          Results for orders placed during the hospital encounter of 12/21/23    Adult Transthoracic Echo Complete W/ Cont if Necessary Per Protocol    Interpretation Summary    Left ventricular systolic function is normal. Estimated left ventricular EF = 55%    Left ventricular wall thickness is consistent with hypertrophy.    Left ventricular diastolic function is consistent with (grade I) impaired relaxation.    Estimated right ventricular systolic pressure from tricuspid regurgitation is normal (<35 mmHg).    Mild dilation of the ascending aorta is present (cm 4.5 cm)     Results for orders placed in visit on " 01/07/14    Cardiac catheterization    Narrative  [address]  [address]      PROCEDURE NOTE    PATIENT NAME:  STEPHANIE CASTANEDA  1  [facility] NO:   [id number]  [facility] NO:        [id number]        YOB: 1945      DATE OF ADMISSION:  01/07/2014  DATE OF PROCEDURE:  01/07/2014    REFERRING PHYSICIAN:    PRIMARY CARE PHYSICIAN:  Xavier Briones M.D.*  [facility]  [place], [place]    :  Christiano Priest M.D.*    PROCEDURES PERFORMED:  1.   ICD generator change.  2.   Defibrillation threshold testing for ventricular  fibrillation.    INDICATIONS FOR THIS PROCEDURE:  1.   ICD generator at end of life.  2.   Defibrillation threshold testing for ventricular  fibrillation.    HARDWARE:  1.   Newly implanted device - Medtronic.  A.   Model No. [id number].  B.   Serial No. [id number].          DESCRIPTION OF THE PROCEDURE:  Informed and signed consent was obtained, and the patient  was brought to the EP Laboratory and placed on the table in  the supine position.    The left chest was prepped, draped, and anesthetized in the usual sterile  fashion. An incision was made in the left deltopectoral fossa and extended down  to the ICD generator.  The leads and generator were dissected from the pocket. The pocket was d'brided  lightly from fibrous tissue. Hemostasis  was verified, and the pocket was irrigated copiously with antibiotic solution.    The interrogation of the leads revealed a P-wave measured  at 4.0 millivolts, and the R-wave was measured at 6.6 millivolts. The right  ventricular threshold was measured at  one volt, and the lead impedance was measured at 403 ohms.  The atrial threshold was measured at 0.5 volts, and the  lead impedance was measured at 568 ohms. The leads were connected to the new  pulse generator, and then reinserted  into the pocket. The pocket was irrigated copiously with antibiotic  solution,  and hemostasis was verified.    The pocket was then closed in layers; 2-0 Vicryl for the  deep layer and the subcutaneous layer, 3-0 Vicryl for the  skin and subcuticular layer. Steri-Strips and a sterile  dressing were applied.    COMPLICATIONS:  There were no complications noted.    At the completion of the procedure, ventricular  fibrillation was induced with a synchronized T-wave shock.  The device properly sensed and terminated VF with a  20-joule shock with a high voltage impedance measured at 44 ohms.        DESCRIPTION OF THE PROCEDURE (Continued):  The patient was then prepared for transport back to his  room in a satisfactory condition.        Christiano Priest M.D.*  MGR/rxjaw  DD:  01/07/2014 09:25:06  DT:  01/07/2014 18:05:30  Voice Rec. ID #[id number]  Original Voice Rec. ID #[id number]  Doc ID #[id number]  Revision Count:  0  cc:  Christiano Priest M.D.*  Xavier Briones M.D.*  [place] [address]      CARDIAC CATHETERIZATION    PATIENT NAME:  STEPHANIE PALACIOS  1  [facility] NO:   [id number]  [facility] NO:        [id number]        YOB: 1945  <END HEADER>  DO NOT TEXT EDIT THIS LINE :[id number]:  Authenticated by CHRISTIANO PRIEST M.D. On 01/10/2014 09:24:41 AM      I personally viewed and interpreted the patient's EKG/Telemetry/lab data      ECG 12 Lead    Date/Time: 11/12/2024 10:19 AM  Performed by: John Packer PA-C    Authorized by: John Packer PA-C  Comparison: compared with previous ECG from 5/27/2022  Similar to previous ECG  Comparison to previous ECG: Lateral TWI now present in absence of symptoms  Pacing capture: atrial paced ventricular sensed rhythm.  Clinical impression: abnormal EKG          Stephanie Palacios  reports that he has never smoked. He has never been exposed to tobacco smoke. He has never used smokeless tobacco.     Advance Care Planning   Advance Care Planning: ACP discussion was  declined by the patient. Patient does not have an advance directive, information provided.     Assessment & Plan    1. VF (ventricular fibrillation)  Scar related post MI years ago, now s/p DC ICD with no recurrence    2. ICD (implantable cardioverter-defibrillator), dual, in situ  The patient's Medtronic dual-chamber ICD was a trigger in the office demonstrating normal device function with 7.1 years left on the battery, acceptable threshold impedance values, 98% atrial pacing, near 0% ventricular pacing and no events.  Base rate set at 70 bpm we will leave this.  No significant recurrence of RV lead noise.    3. Primary hypertension  BP controlled in the office today.  Continue current antihypertensive regimen.       Follow Up:  Return in about 1 year (around 11/12/2025).      Thank you for allowing me to participate in the care of your patient. Please do not hesitate to contact me with additional questions or concerns.      John Packer PA-C  Cardiac Electrophysiology  [facility] / [facility]

## 2024-11-12 NOTE — PROGRESS NOTES
"November 12, 2024    Hello, may I speak with Abbe Palacios? Yes.    My name is Barb NUNEZ      I am  with [facility]ARD[facility]  [address]  [place] [address] [phone].    Before we get started may I verify your date of birth? 1945, Yes, correct.    I am calling to officially welcome you to our practice and ask about your recent visit. Is this a good time to talk? yes    Tell me about your visit with us. What things went well?  \"Everything.\"       We're always looking for ways to make our patients' experiences even better. Do you have recommendations on ways we may improve?  no, \"everything went fine to me.\"    Overall were you satisfied with your first visit to our practice? yes       I appreciate you taking the time to speak with me today. Is there anything else I can do for you? no      Thank you, and have a great day.      "

## 2024-11-15 ENCOUNTER — OFFICE VISIT (OUTPATIENT)
Dept: INTERNAL MEDICINE | Facility: CLINIC | Age: 79
End: 2024-11-15
Payer: MEDICARE

## 2024-11-15 VITALS
TEMPERATURE: 97.3 F | BODY MASS INDEX: 30.5 KG/M2 | HEART RATE: 78 BPM | DIASTOLIC BLOOD PRESSURE: 72 MMHG | HEIGHT: 78 IN | SYSTOLIC BLOOD PRESSURE: 126 MMHG | WEIGHT: 263.6 LBS

## 2024-11-15 DIAGNOSIS — E11.21 DIABETIC NEPHROPATHY ASSOCIATED WITH TYPE 2 DIABETES MELLITUS: Primary | ICD-10-CM

## 2024-11-15 DIAGNOSIS — N18.31 STAGE 3A CHRONIC KIDNEY DISEASE: ICD-10-CM

## 2024-11-15 DIAGNOSIS — I10 PRIMARY HYPERTENSION: ICD-10-CM

## 2024-11-15 DIAGNOSIS — E78.2 MIXED HYPERLIPIDEMIA: ICD-10-CM

## 2024-11-18 ENCOUNTER — LAB (OUTPATIENT)
Dept: LAB | Facility: HOSPITAL | Age: 79
End: 2024-11-18
Payer: MEDICARE

## 2024-11-18 DIAGNOSIS — I10 PRIMARY HYPERTENSION: ICD-10-CM

## 2024-11-18 DIAGNOSIS — E11.21 DIABETIC NEPHROPATHY ASSOCIATED WITH TYPE 2 DIABETES MELLITUS: ICD-10-CM

## 2024-11-18 DIAGNOSIS — E78.2 MIXED HYPERLIPIDEMIA: ICD-10-CM

## 2024-11-18 LAB
ALBUMIN SERPL-MCNC: 4.3 G/DL (ref 3.5–5.2)
ALBUMIN UR-MCNC: 2.3 MG/DL
ALBUMIN/GLOB SERPL: 1.6 G/DL
ALP SERPL-CCNC: 78 U/L (ref 39–117)
ALT SERPL W P-5'-P-CCNC: 18 U/L (ref 1–41)
ANION GAP SERPL CALCULATED.3IONS-SCNC: 12.2 MMOL/L (ref 5–15)
AST SERPL-CCNC: 26 U/L (ref 1–40)
BASOPHILS # BLD AUTO: 0.07 10*3/MM3 (ref 0–0.2)
BASOPHILS NFR BLD AUTO: 0.7 % (ref 0–1.5)
BILIRUB SERPL-MCNC: 0.6 MG/DL (ref 0–1.2)
BUN SERPL-MCNC: 15 MG/DL (ref 8–23)
BUN/CREAT SERPL: 13.6 (ref 7–25)
CALCIUM SPEC-SCNC: 9.3 MG/DL (ref 8.6–10.5)
CHLORIDE SERPL-SCNC: 106 MMOL/L (ref 98–107)
CHOLEST SERPL-MCNC: 145 MG/DL (ref 0–200)
CO2 SERPL-SCNC: 25.8 MMOL/L (ref 22–29)
CREAT SERPL-MCNC: 1.1 MG/DL (ref 0.76–1.27)
CREAT UR-MCNC: 163.6 MG/DL
DEPRECATED RDW RBC AUTO: 42.8 FL (ref 37–54)
EGFRCR SERPLBLD CKD-EPI 2021: 67.4 ML/MIN/1.73
EOSINOPHIL # BLD AUTO: 0.61 10*3/MM3 (ref 0–0.4)
EOSINOPHIL NFR BLD AUTO: 5.7 % (ref 0.3–6.2)
ERYTHROCYTE [DISTWIDTH] IN BLOOD BY AUTOMATED COUNT: 12 % (ref 12.3–15.4)
GLOBULIN UR ELPH-MCNC: 2.7 GM/DL
GLUCOSE SERPL-MCNC: 118 MG/DL (ref 65–99)
HBA1C MFR BLD: 6.5 % (ref 4.8–5.6)
HCT VFR BLD AUTO: 45.5 % (ref 37.5–51)
HDLC SERPL-MCNC: 39 MG/DL (ref 40–60)
HGB BLD-MCNC: 15.2 G/DL (ref 13–17.7)
IMM GRANULOCYTES # BLD AUTO: 0.04 10*3/MM3 (ref 0–0.05)
IMM GRANULOCYTES NFR BLD AUTO: 0.4 % (ref 0–0.5)
LDLC SERPL CALC-MCNC: 77 MG/DL (ref 0–100)
LDLC/HDLC SERPL: 1.86 {RATIO}
LYMPHOCYTES # BLD AUTO: 3.16 10*3/MM3 (ref 0.7–3.1)
LYMPHOCYTES NFR BLD AUTO: 29.5 % (ref 19.6–45.3)
MCH RBC QN AUTO: 32.2 PG (ref 26.6–33)
MCHC RBC AUTO-ENTMCNC: 33.4 G/DL (ref 31.5–35.7)
MCV RBC AUTO: 96.4 FL (ref 79–97)
MICROALBUMIN/CREAT UR: 14.1 MG/G (ref 0–29)
MONOCYTES # BLD AUTO: 0.86 10*3/MM3 (ref 0.1–0.9)
MONOCYTES NFR BLD AUTO: 8 % (ref 5–12)
NEUTROPHILS NFR BLD AUTO: 5.98 10*3/MM3 (ref 1.7–7)
NEUTROPHILS NFR BLD AUTO: 55.7 % (ref 42.7–76)
NRBC BLD AUTO-RTO: 0 /100 WBC (ref 0–0.2)
PLATELET # BLD AUTO: 254 10*3/MM3 (ref 140–450)
PMV BLD AUTO: 10.5 FL (ref 6–12)
POTASSIUM SERPL-SCNC: 4.4 MMOL/L (ref 3.5–5.2)
PROT SERPL-MCNC: 7 G/DL (ref 6–8.5)
RBC # BLD AUTO: 4.72 10*6/MM3 (ref 4.14–5.8)
SODIUM SERPL-SCNC: 144 MMOL/L (ref 136–145)
TRIGL SERPL-MCNC: 167 MG/DL (ref 0–150)
VLDLC SERPL-MCNC: 29 MG/DL (ref 5–40)
WBC NRBC COR # BLD AUTO: 10.72 10*3/MM3 (ref 3.4–10.8)

## 2024-11-18 PROCEDURE — 82043 UR ALBUMIN QUANTITATIVE: CPT

## 2024-11-18 PROCEDURE — 85025 COMPLETE CBC W/AUTO DIFF WBC: CPT

## 2024-11-18 PROCEDURE — 83036 HEMOGLOBIN GLYCOSYLATED A1C: CPT

## 2024-11-18 PROCEDURE — 82570 ASSAY OF URINE CREATININE: CPT

## 2024-11-18 PROCEDURE — 80053 COMPREHEN METABOLIC PANEL: CPT

## 2024-11-18 PROCEDURE — 80061 LIPID PANEL: CPT

## 2024-12-05 RX ORDER — METOPROLOL TARTRATE 50 MG
50 TABLET ORAL EVERY 12 HOURS
Qty: 180 TABLET | Refills: 0 | Status: SHIPPED | OUTPATIENT
Start: 2024-12-05

## 2024-12-05 RX ORDER — ATORVASTATIN CALCIUM 40 MG/1
40 TABLET, FILM COATED ORAL DAILY
Qty: 90 TABLET | Refills: 0 | Status: SHIPPED | OUTPATIENT
Start: 2024-12-05

## 2024-12-05 RX ORDER — CLOPIDOGREL BISULFATE 75 MG/1
75 TABLET ORAL DAILY
Qty: 90 TABLET | Refills: 0 | Status: SHIPPED | OUTPATIENT
Start: 2024-12-05

## 2024-12-05 RX ORDER — LISINOPRIL 5 MG/1
5 TABLET ORAL DAILY
Qty: 90 TABLET | Refills: 0 | Status: SHIPPED | OUTPATIENT
Start: 2024-12-05

## 2025-02-26 ENCOUNTER — OFFICE VISIT (OUTPATIENT)
Dept: CARDIOLOGY | Facility: CLINIC | Age: 80
End: 2025-02-26
Payer: MEDICARE

## 2025-02-26 VITALS
HEIGHT: 78 IN | BODY MASS INDEX: 30.75 KG/M2 | HEART RATE: 85 BPM | SYSTOLIC BLOOD PRESSURE: 120 MMHG | WEIGHT: 265.8 LBS | OXYGEN SATURATION: 96 % | DIASTOLIC BLOOD PRESSURE: 68 MMHG

## 2025-02-26 DIAGNOSIS — Z95.810 ICD (IMPLANTABLE CARDIOVERTER-DEFIBRILLATOR), DUAL, IN SITU: ICD-10-CM

## 2025-02-26 DIAGNOSIS — I49.01 VF (VENTRICULAR FIBRILLATION): Primary | ICD-10-CM

## 2025-02-26 DIAGNOSIS — I25.10 CORONARY ARTERY DISEASE INVOLVING NATIVE CORONARY ARTERY OF NATIVE HEART WITHOUT ANGINA PECTORIS: ICD-10-CM

## 2025-02-26 DIAGNOSIS — E78.2 MIXED HYPERLIPIDEMIA: ICD-10-CM

## 2025-02-26 DIAGNOSIS — I10 PRIMARY HYPERTENSION: ICD-10-CM

## 2025-03-04 RX ORDER — CLOPIDOGREL BISULFATE 75 MG/1
75 TABLET ORAL DAILY
Qty: 90 TABLET | Refills: 1 | Status: SHIPPED | OUTPATIENT
Start: 2025-03-04

## 2025-03-04 RX ORDER — ATORVASTATIN CALCIUM 40 MG/1
40 TABLET, FILM COATED ORAL DAILY
Qty: 90 TABLET | Refills: 1 | Status: SHIPPED | OUTPATIENT
Start: 2025-03-04

## 2025-03-04 RX ORDER — METOPROLOL TARTRATE 50 MG
50 TABLET ORAL EVERY 12 HOURS
Qty: 180 TABLET | Refills: 1 | Status: SHIPPED | OUTPATIENT
Start: 2025-03-04

## 2025-04-23 RX ORDER — LISINOPRIL 5 MG/1
5 TABLET ORAL DAILY
Qty: 90 TABLET | Refills: 0 | Status: SHIPPED | OUTPATIENT
Start: 2025-04-23

## 2025-05-20 ENCOUNTER — LAB (OUTPATIENT)
Dept: LAB | Facility: HOSPITAL | Age: 80
End: 2025-05-20
Payer: MEDICARE

## 2025-05-20 ENCOUNTER — OFFICE VISIT (OUTPATIENT)
Dept: INTERNAL MEDICINE | Facility: CLINIC | Age: 80
End: 2025-05-20
Payer: MEDICARE

## 2025-05-20 VITALS
SYSTOLIC BLOOD PRESSURE: 124 MMHG | BODY MASS INDEX: 30.38 KG/M2 | HEART RATE: 86 BPM | DIASTOLIC BLOOD PRESSURE: 82 MMHG | WEIGHT: 262.6 LBS | OXYGEN SATURATION: 96 % | TEMPERATURE: 98 F | HEIGHT: 78 IN

## 2025-05-20 DIAGNOSIS — E78.2 MIXED HYPERLIPIDEMIA: ICD-10-CM

## 2025-05-20 DIAGNOSIS — E11.21 DIABETIC NEPHROPATHY ASSOCIATED WITH TYPE 2 DIABETES MELLITUS: ICD-10-CM

## 2025-05-20 DIAGNOSIS — Z00.00 MEDICARE ANNUAL WELLNESS VISIT, SUBSEQUENT: Primary | ICD-10-CM

## 2025-05-20 DIAGNOSIS — I10 PRIMARY HYPERTENSION: ICD-10-CM

## 2025-05-20 DIAGNOSIS — N18.31 STAGE 3A CHRONIC KIDNEY DISEASE: ICD-10-CM

## 2025-05-20 LAB
ALBUMIN SERPL-MCNC: 4.3 G/DL (ref 3.5–5.2)
ALBUMIN/GLOB SERPL: 1.3 G/DL
ALP SERPL-CCNC: 86 U/L (ref 39–117)
ALT SERPL W P-5'-P-CCNC: 24 U/L (ref 1–41)
ANION GAP SERPL CALCULATED.3IONS-SCNC: 11.1 MMOL/L (ref 5–15)
AST SERPL-CCNC: 35 U/L (ref 1–40)
BILIRUB SERPL-MCNC: 0.6 MG/DL (ref 0–1.2)
BUN SERPL-MCNC: 16 MG/DL (ref 8–23)
BUN/CREAT SERPL: 13.1 (ref 7–25)
CALCIUM SPEC-SCNC: 9.4 MG/DL (ref 8.6–10.5)
CHLORIDE SERPL-SCNC: 105 MMOL/L (ref 98–107)
CHOLEST SERPL-MCNC: 165 MG/DL (ref 0–200)
CO2 SERPL-SCNC: 25.9 MMOL/L (ref 22–29)
CREAT SERPL-MCNC: 1.22 MG/DL (ref 0.76–1.27)
EGFRCR SERPLBLD CKD-EPI 2021: 59.2 ML/MIN/1.73
GLOBULIN UR ELPH-MCNC: 3.2 GM/DL
GLUCOSE SERPL-MCNC: 123 MG/DL (ref 65–99)
HBA1C MFR BLD: 6.8 % (ref 4.8–5.6)
HDLC SERPL-MCNC: 42 MG/DL (ref 40–60)
LDLC SERPL CALC-MCNC: 95 MG/DL (ref 0–100)
LDLC/HDLC SERPL: 2.17 {RATIO}
POTASSIUM SERPL-SCNC: 4.6 MMOL/L (ref 3.5–5.2)
PROT SERPL-MCNC: 7.5 G/DL (ref 6–8.5)
SODIUM SERPL-SCNC: 142 MMOL/L (ref 136–145)
TRIGL SERPL-MCNC: 160 MG/DL (ref 0–150)
VLDLC SERPL-MCNC: 28 MG/DL (ref 5–40)

## 2025-05-20 PROCEDURE — 80061 LIPID PANEL: CPT

## 2025-05-20 PROCEDURE — 83036 HEMOGLOBIN GLYCOSYLATED A1C: CPT

## 2025-05-20 PROCEDURE — 80053 COMPREHEN METABOLIC PANEL: CPT

## 2025-05-20 NOTE — PROGRESS NOTES
Subjective   The ABCs of the Annual Wellness Visit  Medicare Wellness Visit      Abbe Palacios is a 80 y.o. patient who presents for a Medicare Wellness Visit.    The following portions of the patient's history were reviewed and   updated as appropriate: allergies, current medications, past family history, past medical history, past social history, past surgical history, and problem list.    Compared to one year ago, the patient's physical   health is the same.  Compared to one year ago, the patient's mental   health is the same.    Recent Hospitalizations:  He was not admitted to the hospital during the last year.     Current Medical Providers:  Patient Care Team:  Xavier Briones MD as PCP - General  Xavier Briones MD as PCP - Family Medicine  Abbe Mendieta III, MD as Cardiologist (Cardiology)  Alexis Dickens MD as Consulting Physician (Cardiology)    Outpatient Medications Prior to Visit   Medication Sig Dispense Refill    Acetaminophen 500 MG coapsule Take 2 capsules by mouth As Needed for Mild Pain.      aspirin 81 MG EC tablet Take 1 tablet by mouth Daily.      atorvastatin (LIPITOR) 40 MG tablet TAKE 1 TABLET BY MOUTH DAILY 90 tablet 1    cetirizine (zyrTEC) 10 MG tablet Take 1 tablet by mouth Every Night.      clopidogrel (PLAVIX) 75 MG tablet TAKE 1 TABLET BY MOUTH DAILY 90 tablet 1    Cyanocobalamin (Vitamin B-12) 5000 MCG tablet dispersible Take 1 tablet by mouth Daily.      guaiFENesin-dextromethorphan (ROBITUSSIN DM) 100-10 MG/5ML syrup Take 5 mL by mouth 3 (Three) Times a Day As Needed for Cough. (Patient taking differently: Take 5 mL by mouth As Needed for Cough.) 237 mL 0    lisinopril (PRINIVIL,ZESTRIL) 5 MG tablet Take 1 tablet by mouth Daily. 90 tablet 0    Lumigan 0.01 % ophthalmic drops Administer 1 drop to both eyes Every Night.      metoprolol tartrate (LOPRESSOR) 50 MG tablet TAKE 1 TABLET BY MOUTH EVERY 12 HOURS 180 tablet 1    Multiple Vitamins-Minerals (MULTIVITAMIN ADULT PO)  "Take 1 tablet by mouth Daily.       No facility-administered medications prior to visit.     No opioid medication identified on active medication list. I have reviewed chart for other potential  high risk medication/s and harmful drug interactions in the elderly.      Aspirin is on active medication list. Aspirin use is indicated based on review of current medical condition/s. Pros and cons of this therapy have been discussed today. Benefits of this medication outweigh potential harm.  Patient has been encouraged to continue taking this medication.  .      Patient Active Problem List   Diagnosis    Coronary artery disease    Peripheral neuropathy    VF (ventricular fibrillation)    Hypertrophy of prostate without urinary obstruction    Diabetic polyneuropathy associated with type 2 diabetes mellitus    Obesity    Mixed hyperlipidemia    Primary hypertension    Seasonal allergic rhinitis    Diabetic nephropathy associated with type 2 diabetes mellitus    Chronic kidney disease, stage III (moderate)    Malignant melanoma of overlapping sites    Acute sepsis    ICD (implantable cardioverter-defibrillator), dual, in situ     Advance Care Planning Advance Directive is not on file.  ACP discussion was held with the patient during this visit. Patient has an advance directive (not in EMR), copy requested.            Objective   Vitals:    05/20/25 0808   BP: 124/82   BP Location: Left arm   Patient Position: Sitting   Cuff Size: Adult   Pulse: 86   Temp: 98 °F (36.7 °C)   TempSrc: Infrared   SpO2: 96%   Weight: 119 kg (262 lb 9.6 oz)   Height: 198.1 cm (77.99\")   PainSc: 5    PainLoc: Back       Estimated body mass index is 30.35 kg/m² as calculated from the following:    Height as of this encounter: 198.1 cm (77.99\").    Weight as of this encounter: 119 kg (262 lb 9.6 oz).    BMI is >= 30 and <35. (Class 1 Obesity). The following options were offered after discussion;: exercise counseling/recommendations and nutrition " counseling/recommendations           Does the patient have evidence of cognitive impairment? No  Lab Results   Component Value Date    TRIG 160 (H) 2025    HDL 42 2025    LDL 95 2025    VLDL 28 2025    HGBA1C 6.80 (H) 2025                                                                                                Health  Risk Assessment    Smoking Status:  Social History     Tobacco Use   Smoking Status Never    Passive exposure: Never   Smokeless Tobacco Never   Tobacco Comments    no passive smoke exposure     Alcohol Consumption:  Social History     Substance and Sexual Activity   Alcohol Use Not Currently    Alcohol/week: 2.0 standard drinks of alcohol    Types: 1 Glasses of wine, 1 Cans of beer per week    Comment: very rarely       Fall Risk Screen  MARIELAADI Fall Risk Assessment was completed, and patient is at MODERATE risk for falls. Assessment completed on:2025    Depression Screening   Little interest or pleasure in doing things? Not at all   Feeling down, depressed, or hopeless? Not at all   PHQ-2 Total Score 0      Health Habits and Functional and Cognitive Screenin/14/2025     5:56 PM   Functional & Cognitive Status   Do you have difficulty preparing food and eating? No   Do you have difficulty bathing yourself, getting dressed or grooming yourself? No   Do you have difficulty using the toilet? No   Do you have difficulty moving around from place to place? No   Do you have trouble with steps or getting out of a bed or a chair? No   Current Diet Limited Junk Food   Dental Exam Up to date   Eye Exam Up to date   Exercise (times per week) 3 times per week   Current Exercises Include House Cleaning;Light Weights;Walking   Do you need help using the phone?  No   Are you deaf or do you have serious difficulty hearing?  No   Do you need help to go to places out of walking distance? No   Do you need help shopping? No   Do you need help preparing meals?  No   Do you  need help with housework?  No   Do you need help with laundry? No   Do you need help taking your medications? No   Do you need help managing money? No   Do you ever drive or ride in a car without wearing a seat belt? No   Have you felt unusual stress, anger or loneliness in the last month? No   Who do you live with? Spouse   If you need help, do you have trouble finding someone available to you? No   Have you been bothered in the last four weeks by sexual problems? No   Do you have difficulty concentrating, remembering or making decisions? No           Age-appropriate Screening Schedule:  Refer to the list below for future screening recommendations based on patient's age, sex and/or medical conditions. Orders for these recommended tests are listed in the plan section. The patient has been provided with a written plan.    Health Maintenance List  Health Maintenance   Topic Date Due    TDAP/TD VACCINES (2 - Td or Tdap) 09/27/2022    DIABETIC EYE EXAM  10/10/2024    COVID-19 Vaccine (7 - 2024-25 season) 04/02/2025    ANNUAL WELLNESS VISIT  05/14/2025    INFLUENZA VACCINE  07/01/2025    URINE MICROALBUMIN-CREATININE RATIO (uACR)  11/18/2025    HEMOGLOBIN A1C  11/20/2025    DIABETIC FOOT EXAM  05/20/2026    LIPID PANEL  05/20/2026    RSV Vaccine - Adults  Completed    Pneumococcal Vaccine 50+  Completed    ZOSTER VACCINE  Completed    COLORECTAL CANCER SCREENING  Discontinued                                                                                                                                                CMS Preventative Services Quick Reference  Risk Factors Identified During Encounter  None Identified    The above risks/problems have been discussed with the patient.  Pertinent information has been shared with the patient in the After Visit Summary.  An After Visit Summary and PPPS were made available to the patient.    Follow Up:   Next Medicare Wellness visit to be scheduled in 1 year.     No opioid  "medication identified on active medication list. I have reviewed chart for other potential  high risk medication/s and harmful drug interactions in the elderly.      Additional E&M Note during same encounter follows:  Patient has additional, significant, and separately identifiable condition(s)/problem(s) that require work above and beyond the Medicare Wellness Visit     Chief Complaint  Medicare Wellness-subsequent and Hypertension    Subjective    HPI  Abbe Palacios is also being seen today for an annual adult preventative physical exam.        The patient presents for a subsequent Medicare annual wellness examination and follow-up of diabetes, chronic kidney disease, hypertension, and hyperlipidemia. He sees Cardiology for coronary artery disease.    He reports experiencing intermittent lower back discomfort, the cause of which remains unknown to him. He finds relief through exercise, although the discomfort tends to recur. Due to caregiving responsibilities at home, he has not been able to engage in as much physical activity as he would like.    He has been experiencing tinnitus for approximately 2 weeks. He takes Zyrtec at night for allergies, which cause congestion overnight.    He has been experiencing urinary incontinence.    His last ophthalmological examination was conducted 6 months ago, and he has a follow-up appointment scheduled for tomorrow. A dermatology appointment that was supposed to take place last week had to be rescheduled.          Objective   Vital Signs:  /82 (BP Location: Left arm, Patient Position: Sitting, Cuff Size: Adult)   Pulse 86   Temp 98 °F (36.7 °C) (Infrared)   Ht 198.1 cm (77.99\")   Wt 119 kg (262 lb 9.6 oz)   SpO2 96%   BMI 30.35 kg/m²   Physical Exam  Vitals reviewed.   Constitutional:       Appearance: Normal appearance. He is well-developed.   HENT:      Head: Normocephalic and atraumatic.      Right Ear: Tympanic membrane and ear canal normal.      Left Ear: " Tympanic membrane and ear canal normal.      Mouth/Throat:      Pharynx: Oropharynx is clear. No posterior oropharyngeal erythema.   Eyes:      Extraocular Movements: Extraocular movements intact.      Conjunctiva/sclera: Conjunctivae normal.      Pupils: Pupils are equal, round, and reactive to light.   Neck:      Thyroid: No thyromegaly.      Vascular: No carotid bruit.   Cardiovascular:      Rate and Rhythm: Normal rate and regular rhythm.      Pulses:           Dorsalis pedis pulses are 1+ on the right side and 1+ on the left side.      Heart sounds: Normal heart sounds. No murmur heard.     No friction rub. No gallop.   Pulmonary:      Effort: Pulmonary effort is normal.      Breath sounds: Normal breath sounds.   Abdominal:      General: Bowel sounds are normal.      Palpations: Abdomen is soft. There is no mass.      Tenderness: There is no abdominal tenderness.   Musculoskeletal:      Cervical back: Normal range of motion and neck supple.      Right lower leg: No edema.      Left lower leg: No edema.      Right foot: No deformity.      Left foot: No deformity.   Feet:      Right foot:      Protective Sensation: 5 sites tested.  5 sites sensed.      Skin integrity: Skin integrity normal.      Left foot:      Protective Sensation: 5 sites tested.  5 sites sensed.      Skin integrity: Skin integrity normal.      Comments: Sensation in both feet slightly reduced to monofilament.     Diabetic Foot Exam Performed and Monofilament Test Performed    Lymphadenopathy:      Cervical: No cervical adenopathy.   Skin:     General: Skin is warm and dry.      Comments: Diffuse seborrheic keratoses on the trunk neck and face.  No suspicious lesions.   Neurological:      Mental Status: He is alert and oriented to person, place, and time.      Cranial Nerves: No cranial nerve deficit.   Psychiatric:         Mood and Affect: Mood normal.         Behavior: Behavior normal.           Extremities: No edema, no  cyanosis            Results  Labs   - A1c: 6.5   - Total cholesterol: 145 mg/dL   - HDL: Low   - Triglycerides: 167 mg/dL           Assessment and Plan Additional age appropriate preventative wellness advice topics were discussed during today's preventative wellness exam(some topics already addressed during AWV portion of the note above):    Physical Activity: Advised cardiovascular activity 150 minutes per week as tolerated. (example brisk walk for 30 minutes, 5 days a week).     Nutrition: Discussed nutrition plan with patient. Information shared in after visit summary. Goal is for a well balanced diet to enhance overall health.     Healthy Weight: Discussed current and goal BMI with patient. Steps to attain this goal discussed. Information shared in after visit summary.     Injury Prevention Discussion:  Information shared in after visit summary.             1. Health maintenance.  He exhibits instability during ambulation but has not experienced any falls. Consequently, he prefers indoor walking over outdoor activities.    2. Diabetes.  His A1c levels have been consistently around 6.5 without any complications. A foot examination was conducted today, and an eye examination is scheduled for 05/21/2025.    3. Hypertension.  His blood pressure is effectively managed with the current regimen of lisinopril and metoprolol.    4. Hyperlipidemia.  The lipid panel results are pending. He is advised to continue with atorvastatin and maintain a healthy diet.    5. Tinnitus.  He reports ringing in his ear for about 2 weeks. It could be related to congestion or allergy symptoms. He is currently taking Zyrtec at night for allergies and congestion. He is advised to continue with Zyrtec or consider using Claritin if needed.    6. Lower back discomfort.  He experiences intermittent lower back discomfort, which improves with exercise. He is advised to continue regular back stretches and exercises to manage the discomfort, likely  due to chronic muscle strain or arthritis.    7. Urinary incontinence.  He reports some leakage. The potential use of medication was discussed, but due to the side effects and his current medication load, it will be considered only if the condition worsens.    Follow-up  The patient will follow up in 6 months or as needed.         Follow Up   Return in about 6 months (around 11/20/2025) for follow up.  Patient was given instructions and counseling regarding his condition or for health maintenance advice. Please see specific information pulled into the AVS if appropriate.  Patient or patient representative verbalized consent for the use of Ambient Listening during the visit with  Xavier Briones MD for chart documentation. 5/23/2025  08:42 EDT

## 2025-06-02 RX ORDER — LISINOPRIL 5 MG/1
5 TABLET ORAL DAILY
Qty: 90 TABLET | Refills: 1 | Status: SHIPPED | OUTPATIENT
Start: 2025-06-02

## 2025-06-05 RX ORDER — CLOPIDOGREL BISULFATE 75 MG/1
75 TABLET ORAL DAILY
Qty: 90 TABLET | Refills: 1 | Status: SHIPPED | OUTPATIENT
Start: 2025-06-05

## 2025-07-02 LAB
MC_CV_MDC_IDC_RATE_1: 150
MC_CV_MDC_IDC_RATE_1: 171
MC_CV_MDC_IDC_RATE_1: 182
MC_CV_MDC_IDC_RATE_1: 207
MC_CV_MDC_IDC_RATE_2: 182
MC_CV_MDC_IDC_RV_HV_IMPEDANCE: 40
MC_CV_MDC_IDC_SVC_MEASURED_IMPEDANCE: 46
MC_CV_MDC_IDC_THERAPIES: NORMAL
MC_CV_MDC_IDC_ZONE_ID: 2
MC_CV_MDC_IDC_ZONE_ID: 3
MC_CV_MDC_IDC_ZONE_ID: 4
MC_CV_MDC_IDC_ZONE_ID: 5
MC_CV_MDC_IDC_ZONE_ID: 6
MDC_IDC_MSMT_BATTERY_REMAINING_LONGEVITY: 73 MO
MDC_IDC_MSMT_BATTERY_RRT_TRIGGER: 2.73
MDC_IDC_MSMT_BATTERY_STATUS: NORMAL
MDC_IDC_MSMT_BATTERY_VOLTAGE: 2.99
MDC_IDC_MSMT_CAP_CHARGE_TIME: 3.85
MDC_IDC_MSMT_LEADCHNL_RA_DTM: NORMAL
MDC_IDC_MSMT_LEADCHNL_RA_IMPEDANCE_VALUE: 399
MDC_IDC_MSMT_LEADCHNL_RA_PACING_THRESHOLD_AMPLITUDE: 0.62
MDC_IDC_MSMT_LEADCHNL_RA_PACING_THRESHOLD_POLARITY: NORMAL
MDC_IDC_MSMT_LEADCHNL_RA_PACING_THRESHOLD_PULSEWIDTH: 0.4
MDC_IDC_MSMT_LEADCHNL_RA_SENSING_INTR_AMPL: 2.75
MDC_IDC_MSMT_LEADCHNL_RV_DTM: NORMAL
MDC_IDC_MSMT_LEADCHNL_RV_IMPEDANCE_VALUE: 361
MDC_IDC_MSMT_LEADCHNL_RV_PACING_THRESHOLD_AMPLITUDE: 1
MDC_IDC_MSMT_LEADCHNL_RV_PACING_THRESHOLD_POLARITY: NORMAL
MDC_IDC_MSMT_LEADCHNL_RV_PACING_THRESHOLD_PULSEWIDTH: 0.4
MDC_IDC_MSMT_LEADCHNL_RV_SENSING_INTR_AMPL: 5.38
MDC_IDC_PG_IMPLANT_DTM: NORMAL
MDC_IDC_PG_MFG: NORMAL
MDC_IDC_PG_MODEL: NORMAL
MDC_IDC_PG_SERIAL: NORMAL
MDC_IDC_PG_TYPE: NORMAL
MDC_IDC_SESS_DTM: NORMAL
MDC_IDC_SESS_TYPE: NORMAL
MDC_IDC_SET_BRADY_AT_MODE_SWITCH_RATE: 171
MDC_IDC_SET_BRADY_LOWRATE: 70
MDC_IDC_SET_BRADY_MAX_SENSOR_RATE: 120
MDC_IDC_SET_BRADY_MAX_TRACKING_RATE: 130
MDC_IDC_SET_BRADY_MODE: NORMAL
MDC_IDC_SET_BRADY_PAV_DELAY: 180
MDC_IDC_SET_BRADY_SAV_DELAY: 150
MDC_IDC_SET_LEADCHNL_RA_PACING_AMPLITUDE: 1.5
MDC_IDC_SET_LEADCHNL_RA_PACING_POLARITY: NORMAL
MDC_IDC_SET_LEADCHNL_RA_PACING_PULSEWIDTH: 0.4
MDC_IDC_SET_LEADCHNL_RA_SENSING_POLARITY: NORMAL
MDC_IDC_SET_LEADCHNL_RA_SENSING_SENSITIVITY: 0.3
MDC_IDC_SET_LEADCHNL_RV_PACING_AMPLITUDE: 2.25
MDC_IDC_SET_LEADCHNL_RV_PACING_POLARITY: NORMAL
MDC_IDC_SET_LEADCHNL_RV_PACING_PULSEWIDTH: 0.4
MDC_IDC_SET_LEADCHNL_RV_SENSING_POLARITY: NORMAL
MDC_IDC_SET_LEADCHNL_RV_SENSING_SENSITIVITY: 0.3
MDC_IDC_SET_ZONE_STATUS: NORMAL
MDC_IDC_SET_ZONE_TYPE: NORMAL
MDC_IDC_STAT_AT_BURDEN_PERCENT: 0
MDC_IDC_STAT_BRADY_RA_PERCENT_PACED: 98.51
MDC_IDC_STAT_BRADY_RV_PERCENT_PACED: 0.14
MDC_IDC_STAT_TACHYTHERAPY_ATP_DELIVERED_RECENT: 0
MDC_IDC_STAT_TACHYTHERAPY_SHOCKS_ABORTED_RECENT: 0
MDC_IDC_STAT_TACHYTHERAPY_SHOCKS_DELIVERED_RECENT: 0

## 2025-08-25 ENCOUNTER — TELEPHONE (OUTPATIENT)
Dept: CARDIOLOGY | Facility: CLINIC | Age: 80
End: 2025-08-25
Payer: MEDICARE

## 2025-08-28 ENCOUNTER — OFFICE VISIT (OUTPATIENT)
Dept: INTERNAL MEDICINE | Facility: CLINIC | Age: 80
End: 2025-08-28
Payer: MEDICARE

## 2025-08-28 VITALS
OXYGEN SATURATION: 94 % | HEART RATE: 86 BPM | DIASTOLIC BLOOD PRESSURE: 65 MMHG | HEIGHT: 78 IN | BODY MASS INDEX: 29.46 KG/M2 | WEIGHT: 254.6 LBS | TEMPERATURE: 98.4 F | SYSTOLIC BLOOD PRESSURE: 95 MMHG

## 2025-08-28 DIAGNOSIS — I95.9 HYPOTENSION, UNSPECIFIED HYPOTENSION TYPE: Primary | ICD-10-CM

## 2025-08-28 PROCEDURE — 1160F RVW MEDS BY RX/DR IN RCRD: CPT | Performed by: STUDENT IN AN ORGANIZED HEALTH CARE EDUCATION/TRAINING PROGRAM

## 2025-08-28 PROCEDURE — 99213 OFFICE O/P EST LOW 20 MIN: CPT | Performed by: STUDENT IN AN ORGANIZED HEALTH CARE EDUCATION/TRAINING PROGRAM

## 2025-08-28 PROCEDURE — 93000 ELECTROCARDIOGRAM COMPLETE: CPT | Performed by: STUDENT IN AN ORGANIZED HEALTH CARE EDUCATION/TRAINING PROGRAM

## 2025-08-28 PROCEDURE — 1159F MED LIST DOCD IN RCRD: CPT | Performed by: STUDENT IN AN ORGANIZED HEALTH CARE EDUCATION/TRAINING PROGRAM

## 2025-08-28 PROCEDURE — 3078F DIAST BP <80 MM HG: CPT | Performed by: STUDENT IN AN ORGANIZED HEALTH CARE EDUCATION/TRAINING PROGRAM

## 2025-08-28 PROCEDURE — 1125F AMNT PAIN NOTED PAIN PRSNT: CPT | Performed by: STUDENT IN AN ORGANIZED HEALTH CARE EDUCATION/TRAINING PROGRAM

## 2025-08-28 PROCEDURE — 3074F SYST BP LT 130 MM HG: CPT | Performed by: STUDENT IN AN ORGANIZED HEALTH CARE EDUCATION/TRAINING PROGRAM

## 2025-08-28 RX ORDER — KETOROLAC TROMETHAMINE 5 MG/ML
1 SOLUTION OPHTHALMIC 4 TIMES DAILY
COMMUNITY

## (undated) DEVICE — LIMB HOLDER, WRIST/ANKLE: Brand: DEROYAL

## (undated) DEVICE — ST EXT IV SMARTSITE 2VLV SP M LL 5ML IV1

## (undated) DEVICE — TRAP FLD MINIVAC MEGADYNE 100ML

## (undated) DEVICE — PLASMABLADE PS210-030S 3.0S LOCK: Brand: PLASMABLADE™

## (undated) DEVICE — ANTIBACTERIAL UNDYED BRAIDED (POLYGLACTIN 910), SYNTHETIC ABSORBABLE SUTURE: Brand: COATED VICRYL

## (undated) DEVICE — DRSNG SURESITE WNDW 4X4.5

## (undated) DEVICE — DRSNG SURESITE123 4X4.8IN

## (undated) DEVICE — SUT ETHLN 3/0 PSLX 30IN 1683H

## (undated) DEVICE — ADHS LIQ MASTISOL 2/3ML

## (undated) DEVICE — GLV SURG PREMIERPRO MIC LTX PF SZ8 BRN

## (undated) DEVICE — GLV SURG PREMIERPRO MIC LTX PF SZ7.5 BRN

## (undated) DEVICE — SUT SILK 3/0 TIES 18IN A184H

## (undated) DEVICE — ADULT, W/LG. BACK PAD, RADIOTRANSPARENT ELEMENT AND LEAD WIRE: Brand: DEFIBRILLATION ELECTRODES

## (undated) DEVICE — SET PRIMARY GRVTY 10DP MALE LL 104IN

## (undated) DEVICE — PK MINOR SPLT 10

## (undated) DEVICE — CAUTERY TIP POLISHER: Brand: DEVON

## (undated) DEVICE — CANN NASL CO2 DIVIDED A/

## (undated) DEVICE — TUBING, SUCTION, 1/4" X 10', STRAIGHT: Brand: MEDLINE

## (undated) DEVICE — MEDI-VAC YANKAUER SUCTION HANDLE W/BULBOUS TIP: Brand: CARDINAL HEALTH

## (undated) DEVICE — ST INF PRI SMRTSTE 20DRP 2VLV 24ML 117

## (undated) DEVICE — PREMIUM DRY TRAY LF: Brand: MEDLINE INDUSTRIES, INC.

## (undated) DEVICE — SUT SILK 4/0 TIES 18IN A183H

## (undated) DEVICE — LEX ELECTRO PHYSIOLOGY: Brand: MEDLINE INDUSTRIES, INC.

## (undated) DEVICE — IRRIGATOR BULB ASEPTO 60CC STRL

## (undated) DEVICE — DECANT BG O JET

## (undated) DEVICE — SOL NACL 0.9PCT 1000ML

## (undated) DEVICE — TBG PENCL TELESCP MEGADYNE SMOKE EVAC 10FT